# Patient Record
Sex: FEMALE | Race: WHITE | NOT HISPANIC OR LATINO | URBAN - METROPOLITAN AREA
[De-identification: names, ages, dates, MRNs, and addresses within clinical notes are randomized per-mention and may not be internally consistent; named-entity substitution may affect disease eponyms.]

---

## 2023-02-22 LAB — ESTRADIOL (PG/ML) IN SER/PLAS: 79 PG/ML

## 2023-02-26 LAB — ESTRADIOL (PG/ML) IN SER/PLAS: 181 PG/ML

## 2023-02-28 LAB
ESTRADIOL (PG/ML) IN SER/PLAS: 645 PG/ML
PROGESTERONE (NG/ML) IN SER/PLAS: 0.7 NG/ML

## 2023-03-02 LAB — ESTRADIOL (PG/ML) IN SER/PLAS: 1409 PG/ML

## 2023-03-03 LAB — ESTRADIOL (PG/ML) IN SER/PLAS: 2419 PG/ML

## 2023-03-04 ENCOUNTER — APPOINTMENT (OUTPATIENT)
Dept: LAB | Facility: LAB | Age: 33
End: 2023-03-04
Payer: COMMERCIAL

## 2023-03-04 LAB
ESTRADIOL (PG/ML) IN SER/PLAS: 3668 PG/ML
PROGESTERONE (NG/ML) IN SER/PLAS: 1 NG/ML

## 2023-03-05 LAB
ESTRADIOL (PG/ML) IN SER/PLAS: 4015 PG/ML
PROGESTERONE (NG/ML) IN SER/PLAS: 1.3 NG/ML

## 2023-03-20 LAB — CHORIOGONADOTROPIN (MIU/ML) IN SER/PLAS: <3 IU/L

## 2023-03-31 LAB
ESTRADIOL (PG/ML) IN SER/PLAS: 526 PG/ML
PROGESTERONE (NG/ML) IN SER/PLAS: 0.6 NG/ML

## 2023-04-04 LAB
ESTRADIOL (PG/ML) IN SER/PLAS: 1613 PG/ML
PROGESTERONE (NG/ML) IN SER/PLAS: 0.6 NG/ML

## 2023-04-11 LAB — PROGESTERONE (NG/ML) IN SER/PLAS: 30.5 NG/ML

## 2023-04-20 LAB — CHORIOGONADOTROPIN (MIU/ML) IN SER/PLAS: 275 MIU/ML

## 2023-04-25 LAB — CHORIOGONADOTROPIN (MIU/ML) IN SER/PLAS: 1693 MIU/ML

## 2023-05-01 LAB — CHORIOGONADOTROPIN (MIU/ML) IN SER/PLAS: ABNORMAL IU/L

## 2023-05-22 LAB
ABO GROUP (TYPE) IN BLOOD: NORMAL
ALANINE AMINOTRANSFERASE (SGPT) (U/L) IN SER/PLAS: 19 U/L (ref 7–45)
ALBUMIN (G/DL) IN SER/PLAS: 4.1 G/DL (ref 3.4–5)
ALKALINE PHOSPHATASE (U/L) IN SER/PLAS: 44 U/L (ref 33–110)
ANION GAP IN SER/PLAS: 16 MMOL/L (ref 10–20)
ANTIBODY SCREEN: NORMAL
ASPARTATE AMINOTRANSFERASE (SGOT) (U/L) IN SER/PLAS: 25 U/L (ref 9–39)
BILIRUBIN TOTAL (MG/DL) IN SER/PLAS: 0.6 MG/DL (ref 0–1.2)
CALCIUM (MG/DL) IN SER/PLAS: 9.2 MG/DL (ref 8.6–10.3)
CARBON DIOXIDE, TOTAL (MMOL/L) IN SER/PLAS: 21 MMOL/L (ref 21–32)
CHLORIDE (MMOL/L) IN SER/PLAS: 104 MMOL/L (ref 98–107)
CREATININE (MG/DL) IN SER/PLAS: 1 MG/DL (ref 0.5–1.05)
CREATININE (MG/DL) IN URINE: NORMAL
ERYTHROCYTE DISTRIBUTION WIDTH (RATIO) BY AUTOMATED COUNT: 13 % (ref 11.5–14.5)
ERYTHROCYTE MEAN CORPUSCULAR HEMOGLOBIN CONCENTRATION (G/DL) BY AUTOMATED: 35.1 G/DL (ref 32–36)
ERYTHROCYTE MEAN CORPUSCULAR VOLUME (FL) BY AUTOMATED COUNT: 92 FL (ref 80–100)
ERYTHROCYTES (10*6/UL) IN BLOOD BY AUTOMATED COUNT: 4.95 X10E12/L (ref 4–5.2)
ESTIMATED AVERAGE GLUCOSE FOR HBA1C: 85 MG/DL
GFR FEMALE: 77 ML/MIN/1.73M2
GLUCOSE (MG/DL) IN SER/PLAS: 93 MG/DL (ref 74–99)
HEMATOCRIT (%) IN BLOOD BY AUTOMATED COUNT: 45.6 % (ref 36–46)
HEMOGLOBIN (G/DL) IN BLOOD: 16 G/DL (ref 12–16)
HEMOGLOBIN A1C/HEMOGLOBIN TOTAL IN BLOOD: 4.6 %
HEPATITIS B VIRUS SURFACE AG PRESENCE IN SERUM: NONREACTIVE
HEPATITIS C VIRUS AB PRESENCE IN SERUM: NONREACTIVE
HIV 1/ 2 AG/AB SCREEN: NONREACTIVE
LACTATE DEHYDROGENASE (U/L) IN SER/PLAS BY LAC->PYR RXN: 171 U/L (ref 84–246)
LEUKOCYTES (10*3/UL) IN BLOOD BY AUTOMATED COUNT: 10.6 X10E9/L (ref 4.4–11.3)
PLATELETS (10*3/UL) IN BLOOD AUTOMATED COUNT: 196 X10E9/L (ref 150–450)
POTASSIUM (MMOL/L) IN SER/PLAS: 3.7 MMOL/L (ref 3.5–5.3)
PROTEIN (MG/DL) IN URINE: NORMAL
PROTEIN TOTAL: 6.6 G/DL (ref 6.4–8.2)
PROTEIN/CREATININE (MG/MG) IN URINE: NORMAL
REFLEX ADDED, ANEMIA PANEL: NORMAL
RH FACTOR: NORMAL
RUBELLA VIRUS IGG AB: POSITIVE
SODIUM (MMOL/L) IN SER/PLAS: 137 MMOL/L (ref 136–145)
SYPHILIS TOTAL AB: NONREACTIVE
THYROTROPIN (MIU/L) IN SER/PLAS BY DETECTION LIMIT <= 0.05 MIU/L: 0.85 MIU/L (ref 0.44–3.98)
URATE (MG/DL) IN SER/PLAS: 4.8 MG/DL (ref 2.3–6.7)
UREA NITROGEN (MG/DL) IN SER/PLAS: 13 MG/DL (ref 6–23)

## 2023-05-23 LAB
CHLAMYDIA TRACH., AMPLIFIED: NEGATIVE
CREATININE (MG/DL) IN URINE: 110 MG/DL (ref 20–320)
N. GONORRHEA, AMPLIFIED: NEGATIVE
PROTEIN (MG/DL) IN URINE: 12 MG/DL (ref 5–24)
PROTEIN/CREATININE (MG/MG) IN URINE: 0.11 MG/MG CREAT (ref 0–0.17)
URINE CULTURE: NO GROWTH

## 2023-05-24 LAB
HEMOGLOBIN A2: 2.5 %
HEMOGLOBIN A: 97.1 %
HEMOGLOBIN F: 0.4 %
HEMOGLOBIN IDENTIFICATION INTERPRETATION: NORMAL
PATH REVIEW-HGB IDENTIFICATION: NORMAL

## 2023-06-14 LAB
ABO GROUP (TYPE) IN BLOOD: NORMAL
ANTIBODY SCREEN: NORMAL
ERYTHROCYTE DISTRIBUTION WIDTH (RATIO) BY AUTOMATED COUNT: 13.7 % (ref 11.5–14.5)
ERYTHROCYTE MEAN CORPUSCULAR HEMOGLOBIN CONCENTRATION (G/DL) BY AUTOMATED: 33.4 G/DL (ref 32–36)
ERYTHROCYTE MEAN CORPUSCULAR VOLUME (FL) BY AUTOMATED COUNT: 98 FL (ref 80–100)
ERYTHROCYTES (10*6/UL) IN BLOOD BY AUTOMATED COUNT: 4.5 X10E12/L (ref 4–5.2)
HEMATOCRIT (%) IN BLOOD BY AUTOMATED COUNT: 44.3 % (ref 36–46)
HEMOGLOBIN (G/DL) IN BLOOD: 14.8 G/DL (ref 12–16)
LEUKOCYTES (10*3/UL) IN BLOOD BY AUTOMATED COUNT: 6.1 X10E9/L (ref 4.4–11.3)
NRBC (PER 100 WBCS) BY AUTOMATED COUNT: 0 /100 WBC (ref 0–0)
PLATELETS (10*3/UL) IN BLOOD AUTOMATED COUNT: 152 X10E9/L (ref 150–450)
RH FACTOR: NORMAL

## 2023-06-16 ENCOUNTER — HOSPITAL ENCOUNTER (OUTPATIENT)
Dept: DATA CONVERSION | Facility: HOSPITAL | Age: 33
End: 2023-06-16
Attending: OBSTETRICS & GYNECOLOGY | Admitting: OBSTETRICS & GYNECOLOGY
Payer: COMMERCIAL

## 2023-06-16 DIAGNOSIS — O34.31: ICD-10-CM

## 2023-06-16 DIAGNOSIS — F32.A DEPRESSION, UNSPECIFIED: ICD-10-CM

## 2023-06-16 DIAGNOSIS — Z79.82 LONG TERM (CURRENT) USE OF ASPIRIN: ICD-10-CM

## 2023-06-16 DIAGNOSIS — O09.211: ICD-10-CM

## 2023-06-16 DIAGNOSIS — O99.341: ICD-10-CM

## 2023-06-16 DIAGNOSIS — O34.30: ICD-10-CM

## 2023-06-16 DIAGNOSIS — O36.0910: ICD-10-CM

## 2023-06-16 DIAGNOSIS — F41.9 ANXIETY DISORDER, UNSPECIFIED: ICD-10-CM

## 2023-06-16 DIAGNOSIS — O26.21: ICD-10-CM

## 2023-06-16 DIAGNOSIS — Z3A.12 12 WEEKS GESTATION OF PREGNANCY (HHS-HCC): ICD-10-CM

## 2023-06-16 LAB
ABO GROUP (TYPE) IN BLOOD: NORMAL
ANTIBODY SCREEN: NORMAL
ERYTHROCYTE DISTRIBUTION WIDTH (RATIO) BY AUTOMATED COUNT: 13.3 % (ref 11.5–14.5)
ERYTHROCYTE MEAN CORPUSCULAR HEMOGLOBIN CONCENTRATION (G/DL) BY AUTOMATED: 35.3 G/DL (ref 32–36)
ERYTHROCYTE MEAN CORPUSCULAR VOLUME (FL) BY AUTOMATED COUNT: 93 FL (ref 80–100)
ERYTHROCYTES (10*6/UL) IN BLOOD BY AUTOMATED COUNT: 4.58 X10E12/L (ref 4–5.2)
HEMATOCRIT (%) IN BLOOD BY AUTOMATED COUNT: 42.8 % (ref 36–46)
HEMOGLOBIN (G/DL) IN BLOOD: 15.1 G/DL (ref 12–16)
LEUKOCYTES (10*3/UL) IN BLOOD BY AUTOMATED COUNT: 6 X10E9/L (ref 4.4–11.3)
NRBC (PER 100 WBCS) BY AUTOMATED COUNT: 0 /100 WBC (ref 0–0)
PLATELETS (10*3/UL) IN BLOOD AUTOMATED COUNT: 148 X10E9/L (ref 150–450)
RH FACTOR: NORMAL

## 2023-08-25 LAB — THYROTROPIN (MIU/L) IN SER/PLAS BY DETECTION LIMIT <= 0.05 MIU/L: 2 MIU/L (ref 0.44–3.98)

## 2023-09-07 VITALS — HEIGHT: 62 IN | BODY MASS INDEX: 22.48 KG/M2 | WEIGHT: 122.14 LBS

## 2023-09-25 PROBLEM — O26.20 RECURRENT PREGNANCY LOSS, CURRENTLY PREGNANT (HHS-HCC): Status: ACTIVE | Noted: 2023-09-25

## 2023-09-25 PROBLEM — O09.819: Status: ACTIVE | Noted: 2023-09-25

## 2023-09-25 PROBLEM — N97.9 FEMALE INFERTILITY: Status: ACTIVE | Noted: 2023-09-25

## 2023-09-25 PROBLEM — L25.5 DERMATITIS DUE TO PLANTS, INCLUDING POISON IVY, SUMAC, AND OAK: Status: ACTIVE | Noted: 2023-09-25

## 2023-09-25 PROBLEM — E55.9 VITAMIN D DEFICIENCY: Status: ACTIVE | Noted: 2023-09-25

## 2023-09-25 PROBLEM — N92.6 MISSED PERIOD: Status: ACTIVE | Noted: 2023-09-25

## 2023-09-25 PROBLEM — N96 RECURRENT PREGNANCY LOSS WITHOUT CURRENT PREGNANCY: Status: ACTIVE | Noted: 2023-09-25

## 2023-09-25 RX ORDER — ALPRAZOLAM 0.5 MG/1
0.5 TABLET ORAL 3 TIMES DAILY PRN
Status: ON HOLD | COMMUNITY
Start: 2022-10-15 | End: 2023-12-15 | Stop reason: WASHOUT

## 2023-09-25 RX ORDER — PRENATAL SUPPLEMENT WITH DHA 1100; 30; 1.6; 1.8; 15; 2.5; .012; 1; .15; 20; 25; 2; 1000; 200; 20; 29 [IU]/1; MG/1; MG/1; MG/1; MG/1; MG/1; MG/1; MG/1; MG/1; MG/1; MG/1; MG/1; [IU]/1; MG/1; [IU]/1; MG/1
CAPSULE, GELATIN COATED ORAL
COMMUNITY
End: 2024-02-02 | Stop reason: WASHOUT

## 2023-09-25 RX ORDER — PREDNISONE 10 MG/1
TABLET ORAL
COMMUNITY
Start: 2023-05-02 | End: 2023-10-04 | Stop reason: ALTCHOICE

## 2023-09-25 RX ORDER — DOCONEXENT, NIACINAMIDE, .ALPHA.-TOCOPHEROL ACETATE, DL-, CHOLECALCIFEROL, .BETA.-CAROTENE, ASCORBIC ACID, THIAMINE MONONITRATE, RIBOFLAVIN, PYRIDOXINE HYDROCHLORIDE, CYANOCOBALAMIN, IRON, ZINC OXIDE, CUPRIC OXIDE, POTASSIUM IODIDE, MAGNESIUM OXIDE, FOLIC ACID, AND LEVOMEFOLATE CALCIUM 200; 15; 20; 1000; 1100; 30; 1.6; 1.8; 2.5; 12; 29; 25; 2; 150; 20; .4; .6 MG/1; MG/1; [IU]/1; [IU]/1; [IU]/1; MG/1; MG/1; MG/1; MG/1; UG/1; MG/1; MG/1; MG/1; UG/1; MG/1; MG/1; MG/1
1 CAPSULE, LIQUID FILLED ORAL DAILY
Status: ON HOLD | COMMUNITY
Start: 2022-11-15 | End: 2023-12-15 | Stop reason: WASHOUT

## 2023-09-25 RX ORDER — LEVOTHYROXINE SODIUM 25 UG/1
25 TABLET ORAL DAILY
COMMUNITY
End: 2024-01-02 | Stop reason: SDUPTHER

## 2023-09-25 RX ORDER — DOXYCYCLINE HYCLATE 100 MG
100 TABLET ORAL EVERY 12 HOURS
COMMUNITY
Start: 2022-12-21 | End: 2023-10-05 | Stop reason: ALTCHOICE

## 2023-09-25 RX ORDER — PROGESTERONE 50 MG/ML
INJECTION, SOLUTION INTRAMUSCULAR
COMMUNITY
Start: 2023-05-12 | End: 2023-10-04 | Stop reason: ALTCHOICE

## 2023-09-25 RX ORDER — OXYCODONE AND ACETAMINOPHEN 2.5; 325 MG/1; MG/1
TABLET ORAL
COMMUNITY
Start: 2022-10-16 | End: 2023-10-04 | Stop reason: ALTCHOICE

## 2023-09-25 RX ORDER — ESTRADIOL 2 MG/1
6 TABLET ORAL DAILY
COMMUNITY
Start: 2023-06-01 | End: 2023-10-04 | Stop reason: ALTCHOICE

## 2023-09-25 RX ORDER — ASPIRIN 81 MG/1
1 TABLET ORAL DAILY
COMMUNITY
Start: 2022-06-17 | End: 2023-12-19 | Stop reason: HOSPADM

## 2023-09-25 RX ORDER — SODIUM CHLORIDE 9 MG/ML
INJECTION INTRAMUSCULAR; INTRAVENOUS; SUBCUTANEOUS
COMMUNITY
Start: 2023-01-24 | End: 2023-10-06 | Stop reason: ALTCHOICE

## 2023-10-02 NOTE — OP NOTE
Post Operative Note:     PreOp Diagnosis: History of PPROM   Post-Procedure Diagnosis: Same, s/p Delacruz cerclage  placement   Procedure: 1.  Delacruz Cerclage   Surgeon: March   Resident/Fellow/Other Assistant: Carlos/ Perry   Anesthesia: Spinal   I.V. Fluids: 700   Estimated Blood Loss (mL): 10   Blood Replacement: None   Specimen: no   Complications: None, the patient tolerated the procedure  well.   Findings: Normal appearing cervix with small ectropion  near os, internal os closed at the beginning of the procedure.   Patient Returned To/Condition: LDR/ excellent   Urine Output: 100   Drains and/or Catheters: None   Tourniquet Times: NA   Implants: NA   Additional Details: The patient was taken to the  OR where spinal anesthesia was placed & found to be adequate. She was placed in dorsal lithotomy and prepped and draped in usual sterile fashion.  Right angle retractors were placed with excellent visualization of the cervix.  Ethibond suture was used  in typical purse string fashion to place the cerclage.  The knot was tied at 12 o'clock.  No air knot was placed.  Excellent hemostasis at the conclusion of the procedure.     Michelle Gonzalez MD     Attestation:   Note Completion:  Attending Attestation I was present for the entire procedure          Electronic Signatures:  Michelle Gonzalez (Fellow))  (Signed 16-Jun-2023 11:06)   Authored: Post Operative Note  Palak Canales)  (Signed 16-Jun-2023 11:08)   Authored: Note Completion   Co-Signer: Post Operative Note      Last Updated: 16-Jun-2023 11:08 by Palak Canales)

## 2023-10-04 ENCOUNTER — APPOINTMENT (OUTPATIENT)
Dept: INTEGRATIVE MEDICINE | Facility: CLINIC | Age: 33
End: 2023-10-04
Payer: COMMERCIAL

## 2023-10-04 PROBLEM — O26.893 RH NEGATIVE STATE IN ANTEPARTUM PERIOD, THIRD TRIMESTER (HHS-HCC): Status: ACTIVE | Noted: 2023-10-04

## 2023-10-04 PROBLEM — O34.33: Status: ACTIVE | Noted: 2023-10-04

## 2023-10-04 PROBLEM — O99.283 THYROID DISEASE DURING PREGNANCY IN THIRD TRIMESTER (HHS-HCC): Status: ACTIVE | Noted: 2023-10-04

## 2023-10-04 PROBLEM — O34.30: Status: ACTIVE | Noted: 2023-10-04

## 2023-10-04 PROBLEM — E07.9 THYROID DISEASE DURING PREGNANCY IN THIRD TRIMESTER (HHS-HCC): Status: ACTIVE | Noted: 2023-10-04

## 2023-10-04 PROBLEM — Z67.91 RH NEGATIVE STATE IN ANTEPARTUM PERIOD, THIRD TRIMESTER (HHS-HCC): Status: ACTIVE | Noted: 2023-10-04

## 2023-10-04 NOTE — PROGRESS NOTES
Elidia Lindsay is a 32yo  @ 28w1d with a pregnancy complicated by previable PPROM with a loss at 19 weeks in her last pregnancy, RPL and IVF conception. She has a cerclage in situ.     Overall doing well. Feeling fetal movement. Denies VB, LOF, or CTXs.     EPDS 4 today.     O: Visit Vitals  /75   Wt 59.3 kg (130 lb 11.2 oz)   LMP 2023   BMI 24.70 kg/m²   OB Status Pregnant   Smoking Status Never   BSA 1.6 m²     Gen- NAD  Abd- gravid, nontender   Ext- symmetrical, nontender    Growth scan today 1271g c/w 59%ile     A/P: 32yo  @ 28w1d presenting for a return OB visit.   Hx of previable PPROM with D&E at 21w, PPROM likely around 16w  - History consistent with cervical insufficiency, s/p cerclage at 12 weeks this pregnancy  - Weekly  testing at 28 weeks for patient mental health.     2. RPL  - Extensive workup essentially negative.   - APLS labs negative  - Continue baby ASA    3. Subclinical hypothyroidism  - Continue synthroid 12.5 daily.   - Euthyroid on recent labs     4. IVF pregnancy  - Serial growth planned.   - Fetal echo normal.    5. PNC  - PNL reviewed and normal.   - Risk reducing cffDNA, XY  - Rh negative for rhogam today  - 3rd trimester labs today  - RTC weekly for NST, PNV in 2 and 4 weeks, growth in 4 weeks.   - Planning for delivery at 39 weeks unless other indication for earlier delivery.     Marguerite Srinivasan MD  Maternal Fetal Medicine  Director of Fetal Intervention     ------------------  After visit GCT noted to be 199, patient notified this is diagnostic for GDM. Supplies sent. Will get scheduled for bootcamp.

## 2023-10-06 ENCOUNTER — LAB (OUTPATIENT)
Dept: LAB | Facility: LAB | Age: 33
End: 2023-10-06
Payer: COMMERCIAL

## 2023-10-06 ENCOUNTER — ROUTINE PRENATAL (OUTPATIENT)
Dept: MATERNAL FETAL MEDICINE | Facility: CLINIC | Age: 33
End: 2023-10-06
Payer: COMMERCIAL

## 2023-10-06 ENCOUNTER — TELEPHONE (OUTPATIENT)
Dept: OBSTETRICS AND GYNECOLOGY | Facility: HOSPITAL | Age: 33
End: 2023-10-06

## 2023-10-06 ENCOUNTER — APPOINTMENT (OUTPATIENT)
Dept: RADIOLOGY | Facility: CLINIC | Age: 33
End: 2023-10-06
Payer: COMMERCIAL

## 2023-10-06 ENCOUNTER — ANCILLARY PROCEDURE (OUTPATIENT)
Dept: RADIOLOGY | Facility: CLINIC | Age: 33
End: 2023-10-06
Payer: COMMERCIAL

## 2023-10-06 VITALS — WEIGHT: 130.7 LBS | BODY MASS INDEX: 24.7 KG/M2 | DIASTOLIC BLOOD PRESSURE: 75 MMHG | SYSTOLIC BLOOD PRESSURE: 108 MMHG

## 2023-10-06 DIAGNOSIS — Z23 NEED FOR TDAP VACCINATION: ICD-10-CM

## 2023-10-06 DIAGNOSIS — O09.813 PREGNANCY RESULTING FROM IN VITRO FERTILIZATION IN THIRD TRIMESTER (HHS-HCC): ICD-10-CM

## 2023-10-06 DIAGNOSIS — O34.33: ICD-10-CM

## 2023-10-06 DIAGNOSIS — O09.813 PREGNANCY RESULTING FROM IN VITRO FERTILIZATION IN THIRD TRIMESTER (HHS-HCC): Primary | ICD-10-CM

## 2023-10-06 DIAGNOSIS — Z34.90 PREGNANT (HHS-HCC): ICD-10-CM

## 2023-10-06 DIAGNOSIS — Z67.91 RH NEGATIVE STATE IN ANTEPARTUM PERIOD, THIRD TRIMESTER (HHS-HCC): ICD-10-CM

## 2023-10-06 DIAGNOSIS — O24.419 GESTATIONAL DIABETES MELLITUS (GDM) IN THIRD TRIMESTER, GESTATIONAL DIABETES METHOD OF CONTROL UNSPECIFIED (HHS-HCC): ICD-10-CM

## 2023-10-06 DIAGNOSIS — O26.20 RECURRENT PREGNANCY LOSS, CURRENTLY PREGNANT (HHS-HCC): ICD-10-CM

## 2023-10-06 DIAGNOSIS — E07.9 THYROID DISEASE DURING PREGNANCY IN THIRD TRIMESTER (HHS-HCC): ICD-10-CM

## 2023-10-06 DIAGNOSIS — Z23 NEED FOR VACCINATION FOR H FLU TYPE B: ICD-10-CM

## 2023-10-06 DIAGNOSIS — O26.893 RH NEGATIVE STATE IN ANTEPARTUM PERIOD, THIRD TRIMESTER (HHS-HCC): ICD-10-CM

## 2023-10-06 DIAGNOSIS — O34.30: ICD-10-CM

## 2023-10-06 DIAGNOSIS — O99.283 THYROID DISEASE DURING PREGNANCY IN THIRD TRIMESTER (HHS-HCC): ICD-10-CM

## 2023-10-06 DIAGNOSIS — O24.414 GESTATIONAL DIABETES MELLITUS IN PREGNANCY, INSULIN CONTROLLED (HHS-HCC): ICD-10-CM

## 2023-10-06 DIAGNOSIS — O09.523 SUPERVISION OF ELDERLY MULTIGRAVIDA, THIRD TRIMESTER (HHS-HCC): ICD-10-CM

## 2023-10-06 LAB
ERYTHROCYTE [DISTWIDTH] IN BLOOD BY AUTOMATED COUNT: 14.9 % (ref 11.5–14.5)
GLUCOSE 1H P 50 G GLC PO SERPL-MCNC: 199 MG/DL
HCT VFR BLD AUTO: 35.1 % (ref 36–46)
HGB BLD-MCNC: 12.3 G/DL (ref 12–16)
MCH RBC QN AUTO: 34.1 PG (ref 26–34)
MCHC RBC AUTO-ENTMCNC: 35 G/DL (ref 32–36)
MCV RBC AUTO: 97 FL (ref 80–100)
NRBC BLD-RTO: 0 /100 WBCS (ref 0–0)
PLATELET # BLD AUTO: 146 X10*3/UL (ref 150–450)
PMV BLD AUTO: 11 FL (ref 7.5–11.5)
POC APPEARANCE, URINE: CLEAR
POC BILIRUBIN, URINE: NEGATIVE
POC BLOOD, URINE: NEGATIVE
POC COLOR, URINE: YELLOW
POC GLUCOSE, URINE: NEGATIVE MG/DL
POC KETONES, URINE: NEGATIVE MG/DL
POC LEUKOCYTES, URINE: NEGATIVE
POC NITRITE,URINE: NEGATIVE
POC PH, URINE: 7 PH
POC PROTEIN, URINE: NEGATIVE MG/DL
POC SPECIFIC GRAVITY, URINE: 1.02
POC UROBILINOGEN, URINE: 0.2 EU/DL
RBC # BLD AUTO: 3.61 X10*6/UL (ref 4–5.2)
T PALLIDUM AB SER QL: NONREACTIVE
WBC # BLD AUTO: 7.9 X10*3/UL (ref 4.4–11.3)

## 2023-10-06 PROCEDURE — 76819 FETAL BIOPHYS PROFIL W/O NST: CPT

## 2023-10-06 PROCEDURE — 90471 IMMUNIZATION ADMIN: CPT | Performed by: OBSTETRICS & GYNECOLOGY

## 2023-10-06 PROCEDURE — 86780 TREPONEMA PALLIDUM: CPT

## 2023-10-06 PROCEDURE — 90472 IMMUNIZATION ADMIN EACH ADD: CPT | Performed by: OBSTETRICS & GYNECOLOGY

## 2023-10-06 PROCEDURE — 2500000004 HC RX 250 GENERAL PHARMACY W/ HCPCS (ALT 636 FOR OP/ED): Performed by: OBSTETRICS & GYNECOLOGY

## 2023-10-06 PROCEDURE — 76816 OB US FOLLOW-UP PER FETUS: CPT

## 2023-10-06 PROCEDURE — 90715 TDAP VACCINE 7 YRS/> IM: CPT | Performed by: OBSTETRICS & GYNECOLOGY

## 2023-10-06 PROCEDURE — 76816 OB US FOLLOW-UP PER FETUS: CPT | Performed by: OBSTETRICS & GYNECOLOGY

## 2023-10-06 PROCEDURE — 82947 ASSAY GLUCOSE BLOOD QUANT: CPT

## 2023-10-06 PROCEDURE — 36415 COLL VENOUS BLD VENIPUNCTURE: CPT

## 2023-10-06 PROCEDURE — 99214 OFFICE O/P EST MOD 30 MIN: CPT | Performed by: OBSTETRICS & GYNECOLOGY

## 2023-10-06 PROCEDURE — 76819 FETAL BIOPHYS PROFIL W/O NST: CPT | Performed by: OBSTETRICS & GYNECOLOGY

## 2023-10-06 PROCEDURE — 85027 COMPLETE CBC AUTOMATED: CPT

## 2023-10-06 PROCEDURE — 90686 IIV4 VACC NO PRSV 0.5 ML IM: CPT | Performed by: OBSTETRICS & GYNECOLOGY

## 2023-10-06 PROCEDURE — 81003 URINALYSIS AUTO W/O SCOPE: CPT | Mod: QW | Performed by: OBSTETRICS & GYNECOLOGY

## 2023-10-06 RX ORDER — ISOPROPYL ALCOHOL 70 ML/100ML
1 SWAB TOPICAL 4 TIMES DAILY
Qty: 120 EACH | Refills: 3 | Status: SHIPPED | OUTPATIENT
Start: 2023-10-06 | End: 2024-02-02 | Stop reason: WASHOUT

## 2023-10-06 RX ORDER — LANCETS
1 EACH MISCELLANEOUS 4 TIMES DAILY
Qty: 120 EACH | Refills: 3 | Status: SHIPPED | OUTPATIENT
Start: 2023-10-06 | End: 2024-05-06 | Stop reason: ALTCHOICE

## 2023-10-06 RX ORDER — BLOOD-GLUCOSE METER
1 EACH MISCELLANEOUS 4 TIMES DAILY
Qty: 120 STRIP | Refills: 3 | Status: SHIPPED | OUTPATIENT
Start: 2023-10-06 | End: 2023-11-05

## 2023-10-06 RX ORDER — INSULIN PUMP SYRINGE, 3 ML
1 EACH MISCELLANEOUS 4 TIMES DAILY
Qty: 1 EACH | Refills: 0 | Status: SHIPPED | OUTPATIENT
Start: 2023-10-06 | End: 2024-02-02 | Stop reason: WASHOUT

## 2023-10-06 RX ADMIN — INFLUENZA VIRUS VACCINE 0.5 ML: 15; 15; 15; 15 SUSPENSION INTRAMUSCULAR at 09:13

## 2023-10-06 RX ADMIN — TETANUS TOXOID, REDUCED DIPHTHERIA TOXOID AND ACELLULAR PERTUSSIS VACCINE, ADSORBED 0.5 ML: 5; 2.5; 8; 8; 2.5 SUSPENSION INTRAMUSCULAR at 09:10

## 2023-10-06 RX ADMIN — HUMAN RHO(D) IMMUNE GLOBULIN 300 MCG: 300 INJECTION, SOLUTION INTRAMUSCULAR at 09:14

## 2023-10-06 ASSESSMENT — EDINBURGH POSTNATAL DEPRESSION SCALE (EPDS)
THINGS HAVE BEEN GETTING ON TOP OF ME: NO, I HAVE BEEN COPING AS WELL AS EVER
I HAVE FELT SAD OR MISERABLE: NO, NOT AT ALL
I HAVE FELT SCARED OR PANICKY FOR NO GOOD REASON: NO, NOT MUCH
I HAVE BEEN SO UNHAPPY THAT I HAVE HAD DIFFICULTY SLEEPING: NOT AT ALL
TOTAL SCORE: 4
I HAVE BLAMED MYSELF UNNECESSARILY WHEN THINGS WENT WRONG: NOT VERY OFTEN
I HAVE BEEN SO UNHAPPY THAT I HAVE BEEN CRYING: NO, NEVER
THE THOUGHT OF HARMING MYSELF HAS OCCURRED TO ME: NEVER
I HAVE LOOKED FORWARD WITH ENJOYMENT TO THINGS: AS MUCH AS I EVER DID
I HAVE BEEN ANXIOUS OR WORRIED FOR NO GOOD REASON: YES, SOMETIMES
I HAVE BEEN ABLE TO LAUGH AND SEE THE FUNNY SIDE OF THINGS: AS MUCH AS I ALWAYS COULD

## 2023-10-07 NOTE — TELEPHONE ENCOUNTER
Patient notified of GCT results, supplies sent.  tasked to set up DM bootcamp.     Marguerite Srinivasan MD

## 2023-10-11 ENCOUNTER — ALLIED HEALTH (OUTPATIENT)
Dept: INTEGRATIVE MEDICINE | Facility: CLINIC | Age: 33
End: 2023-10-11

## 2023-10-11 DIAGNOSIS — O09.813 PREGNANCY RESULTING FROM IN VITRO FERTILIZATION IN THIRD TRIMESTER (HHS-HCC): Primary | ICD-10-CM

## 2023-10-11 PROCEDURE — 97810 ACUP 1/> WO ESTIM 1ST 15 MIN: CPT | Performed by: ACUPUNCTURIST

## 2023-10-11 PROCEDURE — 97811 ACUP 1/> W/O ESTIM EA ADD 15: CPT | Performed by: ACUPUNCTURIST

## 2023-10-11 NOTE — PROGRESS NOTES
Acupuncture Visit:     Subjective   Patient ID: Elidia Lindsay is a 33 y.o. female who presents for Pregnancy Support    Pregnancy support:  She is 29 weeks pregnant.  She was diagnosed with gestational diabetes last week. No longer having hip pain on the right side.         Session Information  Is this acupuncture treatment being billed to the patient's insurance company: No  Visit Type: Follow-up visit  Medical History Reviewed: I have reviewed pertinent medical history in EHR, and no contraindications are present to provide treatment         Review of Systems  Sleep: not good. She is uncomfortable from her back hurting.  Digestion: doing well.        Provider reviewed plan for the acupuncture session, precautions and contraindications. Patient/guardian/hospital staff has given consent to treat with full understanding of what to expect during the session. Before acupuncture began, provider explained to the patient to communicate at any time if the procedure was causing discomfort past their tolerance level. Patient agreed to advise acupuncturist. The acupuncturist counseled the patient on the risks of acupuncture treatment including pain, infection, bleeding, and no relief of pain. The patient was positioned comfortably. There was no evidence of infection at the site of needle insertions.    Objective   Physical Exam         Treatment Plan  Pattern Differentiation: Liver Qi Stagnation    Acupuncture Treatment  Patient Position: Lateral recumbent on a table  Acupuncture Needling: Yes  Needle Guage: 36 guage /.20/ Blue seirin  Body Points: With retention  Body Points - Bilateral: Du 20, UB 20, UB 23, yaoyan  Body Points - Right: GB 34, ST 36, GB 41, SOBEIDA 3, SJ 5  Auricular Points: No  Other Techniques Utilized: Ear seeds, TDP Lamp  Ear Seeds: Stainless steel (Shenmen)  TDP Lamp Descripton: feet and abdomen  Needle Count In: 12  Needle Count Out: 12  Needle Retention Time (min): 25 minutes  Total Face to Face Time  (min): 25 minutes              Assessment/Plan   Diagnoses and all orders for this visit:  Pregnancy resulting from in vitro fertilization in third trimester

## 2023-10-13 ENCOUNTER — CLINICAL SUPPORT (OUTPATIENT)
Dept: OBSTETRICS AND GYNECOLOGY | Facility: CLINIC | Age: 33
End: 2023-10-13
Payer: COMMERCIAL

## 2023-10-13 VITALS — SYSTOLIC BLOOD PRESSURE: 109 MMHG | DIASTOLIC BLOOD PRESSURE: 70 MMHG

## 2023-10-13 DIAGNOSIS — O09.813 PREGNANCY RESULTING FROM IN VITRO FERTILIZATION IN THIRD TRIMESTER (HHS-HCC): ICD-10-CM

## 2023-10-13 DIAGNOSIS — Z3A.29 29 WEEKS GESTATION OF PREGNANCY (HHS-HCC): Primary | ICD-10-CM

## 2023-10-13 DIAGNOSIS — O24.410 DIET CONTROLLED GESTATIONAL DIABETES MELLITUS (GDM) IN THIRD TRIMESTER (HHS-HCC): ICD-10-CM

## 2023-10-13 PROCEDURE — 59025 FETAL NON-STRESS TEST: CPT | Performed by: OBSTETRICS & GYNECOLOGY

## 2023-10-13 NOTE — PROGRESS NOTES
Elidiabelinda Ungerdenzel, a  at 29w1d with an GEOVANNY of 2023, by Ultrasound, was seen at  [facility] for a nonstress test.    Non-Stress Test   Baseline Fetal Heart Rate for Non-Stress Test: 135 BPM  Variability in Waveform for Non-Stress Test: Moderate  Accelerations in Non-Stress Test: Yes, greater than/equal to 10 bpm, lasting at least 10 seconds  Decelerations in Non-Stress Test: None  Contractions in Non-Stress Test: Not present  Acoustic Stimulator for Non-Stress Test: No  Interpretation of Non-Stress Test   Interpretation of Non-Stress Test: Reactive                                       Jacob Bernal, DARIO  Please let the patient know her x-ray was negative for a Lt. Great toe fracture.        Spoke with pt and informed pt of negative xray results. Pt verbalized understanding.

## 2023-10-18 ENCOUNTER — ALLIED HEALTH (OUTPATIENT)
Dept: INTEGRATIVE MEDICINE | Facility: CLINIC | Age: 33
End: 2023-10-18

## 2023-10-18 DIAGNOSIS — O09.813 PREGNANCY RESULTING FROM IN VITRO FERTILIZATION IN THIRD TRIMESTER (HHS-HCC): ICD-10-CM

## 2023-10-18 DIAGNOSIS — M54.59 OTHER LOW BACK PAIN: Primary | ICD-10-CM

## 2023-10-18 PROCEDURE — 97811 ACUP 1/> W/O ESTIM EA ADD 15: CPT | Performed by: ACUPUNCTURIST

## 2023-10-18 PROCEDURE — 97810 ACUP 1/> WO ESTIM 1ST 15 MIN: CPT | Performed by: ACUPUNCTURIST

## 2023-10-18 NOTE — PROGRESS NOTES
Acupuncture Visit:     Subjective   Patient ID: Elidia Lindsay is a 33 y.o. female who presents for Back Pain and Pregnancy Support    Lower back pain: low back showed improvement after the last acupuncture treatment.  Less pain in the lower back and less pain in the right hip.     Pregnancy support:  She is 30 weeks pregnant.  She is doing well with managing her blood sugar.  She is trying to eat more protein and more regularly. She feels like baby has dropped a little.  Less pressure in the diaphragm area.        Session Information  Is this acupuncture treatment being billed to the patient's insurance company: No  Visit Type: Follow-up visit  Medical History Reviewed: I have reviewed pertinent medical history in EHR, and no contraindications are present to provide treatment         Review of Systems  Sleep: not good due to pregnancy  Digestion: doing well.        Provider reviewed plan for the acupuncture session, precautions and contraindications. Patient/guardian/hospital staff has given consent to treat with full understanding of what to expect during the session. Before acupuncture began, provider explained to the patient to communicate at any time if the procedure was causing discomfort past their tolerance level. Patient agreed to advise acupuncturist. The acupuncturist counseled the patient on the risks of acupuncture treatment including pain, infection, bleeding, and no relief of pain. The patient was positioned comfortably. There was no evidence of infection at the site of needle insertions.    Objective   Physical Exam              Acupuncture Treatment  Patient Position: Lateral recumbent on a table  Acupuncture Needling: Yes  Needle Guage: 36 guage /.20/ Blue seirin  Body Points: With retention  Body Points - Left: KD 3  Body Points - Bilateral: Du 20, UB 18, UB 20, UB 23, yaoyan, SOBEIDA 3  Body Points - Right: GB 34, ST 36, GB 41  Auricular Points: No  Other Techniques Utilized: Ear seeds, TDP Lamp  Ear  Seeds: Stainless steel (shenmen)  TDP Lamp Descripton: lower back and feet  Needle Count In: 15  Needle Count Out: 15  Needle Retention Time (min): 25 minutes  Total Face to Face Time (min): 25 minutes              Assessment/Plan   Diagnoses and all orders for this visit:  Other low back pain  Pregnancy resulting from in vitro fertilization in third trimester

## 2023-10-19 ENCOUNTER — EDUCATION (OUTPATIENT)
Dept: MATERNAL FETAL MEDICINE | Facility: HOSPITAL | Age: 33
End: 2023-10-19
Payer: COMMERCIAL

## 2023-10-19 NOTE — PROGRESS NOTES
Gestational Diabetes Self-Management VIRTUAL Group Education provided today,     Overview of Diabetes    Type 1    Type 2    Gestational       -Risks to baby and Mom  Self-monitoring     Tips for Testing/troubleshooting glucometers    Goal range for blood sugars (<95 fasting, <140 1 hour postprandial for all meals)    Record Keeping    Blood Sugar Line    Blood Sugar Log Sheets - provided  Managing Diabetes     Physical Activity - recommendation is about 150 minutes per week    Medication        Oral/insulin overview  Additional  testing     NST/BPP/Growth ultrasound - general overview  Postpartum     Expectations in the hospital    Follow up - recommend fasting, 75g OGGT, 6-8 weeks after delivery     50% change of developing GDM in another pregnancy    50% chance of developing T2DM within next 10 years    Up to 30% of patients will have pre-diabetes or T2DM following delivery       Breastfeeding is strongly encouraged   Special considerations, self-management    Hypoglycemia    Hyperglycemia    Sick Day Rules  Resilience training/stress management    Engage your senses    Gratitude practice  Emotional support     “Baby Blues”    Postpartum Depression       When to call for help  Nutrition planning     Identify carbohydrates, serving size, carbohydrate counting    “Free Foods” - very low carbohydrate options    Label Reading    Personalized Meal Plan     3 meals + 3 snacks = approximately 175g carbohydrates/day    Follow up for 1:1 Registered Dietician consult       [phone] to schedule appointment    Individual consult with Maternal Fetal Medicine provider is scheduled.

## 2023-10-20 ENCOUNTER — ROUTINE PRENATAL (OUTPATIENT)
Dept: MATERNAL FETAL MEDICINE | Facility: CLINIC | Age: 33
End: 2023-10-20
Payer: COMMERCIAL

## 2023-10-20 ENCOUNTER — APPOINTMENT (OUTPATIENT)
Dept: OBSTETRICS AND GYNECOLOGY | Facility: CLINIC | Age: 33
End: 2023-10-20
Payer: COMMERCIAL

## 2023-10-20 VITALS — WEIGHT: 134 LBS | BODY MASS INDEX: 25.32 KG/M2 | SYSTOLIC BLOOD PRESSURE: 107 MMHG | DIASTOLIC BLOOD PRESSURE: 76 MMHG

## 2023-10-20 DIAGNOSIS — Z3A.30 30 WEEKS GESTATION OF PREGNANCY (HHS-HCC): ICD-10-CM

## 2023-10-20 LAB
POC APPEARANCE, URINE: CLEAR
POC BILIRUBIN, URINE: NEGATIVE
POC BLOOD, URINE: NEGATIVE
POC COLOR, URINE: YELLOW
POC GLUCOSE, URINE: NEGATIVE MG/DL
POC KETONES, URINE: NEGATIVE MG/DL
POC LEUKOCYTES, URINE: ABNORMAL
POC NITRITE,URINE: NEGATIVE
POC PH, URINE: 7 PH
POC PROTEIN, URINE: NEGATIVE MG/DL
POC SPECIFIC GRAVITY, URINE: 1.01
POC UROBILINOGEN, URINE: 0.2 EU/DL

## 2023-10-20 PROCEDURE — 99214 OFFICE O/P EST MOD 30 MIN: CPT | Performed by: STUDENT IN AN ORGANIZED HEALTH CARE EDUCATION/TRAINING PROGRAM

## 2023-10-20 PROCEDURE — 81003 URINALYSIS AUTO W/O SCOPE: CPT | Mod: QW | Performed by: STUDENT IN AN ORGANIZED HEALTH CARE EDUCATION/TRAINING PROGRAM

## 2023-10-20 NOTE — PROGRESS NOTES
Elidia Lindsay is a 34yo  @ 30w1d with a pregnancy complicated by previable PPROM with a loss at 19 weeks in her last pregnancy, RPL and IVF conception. She has a cerclage in situ.      Overall doing well. +FM. Has been checking her BG values 4x daily. No concerns.     /76   Wt 60.8 kg (134 lb)   LMP 2023   BMI 25.32 kg/m²   Gen: NAD  Resp: nonlabored breathing  Cardiac: good peripheral perfusion  Abd: gravid  Psych: appropriate mood and affect  FHTs: 140s, mod variability, +accels, no decels  [place]: quiet    A/P:     A/P: 34yo  @ 28w1d presenting for a return OB visit.   1. Hx of previable PPROM with D&E at 21w, PPROM likely around 16w  - History consistent with cervical insufficiency, s/p cerclage at 12 weeks this pregnancy  - Weekly  testing at 28 weeks for patient mental health     2. RPL  - Extensive workup essentially negative.   - APLS labs negative  - Continue baby ASA     3. Subclinical hypothyroidism  - Continue synthroid 12.5 daily.   - Euthyroid on labs . Plan for a repeat TSH at next visit     4. IVF pregnancy  - Serial growth planned  - Last growth at 28w1d showed an EFW of 1271 g (59%)  - Fetal echo normal     5. GDMA1  - Discussed risk of fetal hyperbilirubinemia, hyoglycemia, LGA growth pattern and stillbirth  - Pt s/p bootcamp. Checking 4x daily  - All values wnl. Insulin currently not indicated  - Continue growth ultrasounds and  testing as above    6. PNC  - PNL reviewed and normal.   - Risk reducing cffDNA, XY  - Rh negative s/p Rhogam  - 3rd trimester labs today  - RTC weekly for NST, PNV and growth in 2 weeks  - Planning for delivery at 39 weeks unless other indication for earlier delivery.     Darin Rojas MD  Maternal-Fetal Medicine

## 2023-10-25 ENCOUNTER — ALLIED HEALTH (OUTPATIENT)
Dept: INTEGRATIVE MEDICINE | Facility: CLINIC | Age: 33
End: 2023-10-25

## 2023-10-25 DIAGNOSIS — M54.6 RIGHT-SIDED THORACIC BACK PAIN, UNSPECIFIED CHRONICITY: Primary | ICD-10-CM

## 2023-10-25 DIAGNOSIS — O09.813 PREGNANCY RESULTING FROM IN VITRO FERTILIZATION IN THIRD TRIMESTER (HHS-HCC): ICD-10-CM

## 2023-10-25 PROCEDURE — 97811 ACUP 1/> W/O ESTIM EA ADD 15: CPT | Performed by: ACUPUNCTURIST

## 2023-10-25 PROCEDURE — 97810 ACUP 1/> WO ESTIM 1ST 15 MIN: CPT | Performed by: ACUPUNCTURIST

## 2023-10-25 NOTE — PROGRESS NOTES
Acupuncture Visit:     Subjective   Patient ID: Elidia Lindsay is a 33 y.o. female who presents for Back Pain and Pregnancy Support    Upper back pain: she thinks that she had a rib pop out in the middle back.  Chiropractor worked on it this week and it is starting to improve but tender and swollen.  Pain at the right midback around T 7/8    Pregnancy support:  She is 31 weeks pregnant.  Blood sugar has been well managed.  She is doing well overall.        Session Information  Is this acupuncture treatment being billed to the patient's insurance company: No  Visit Type: Follow-up visit  Medical History Reviewed: I have reviewed pertinent medical history in EHR, and no contraindications are present to provide treatment         Review of Systems  Sleep: really bad due to back pain.  Digestion: doing well.        Provider reviewed plan for the acupuncture session, precautions and contraindications. Patient/guardian/hospital staff has given consent to treat with full understanding of what to expect during the session. Before acupuncture began, provider explained to the patient to communicate at any time if the procedure was causing discomfort past their tolerance level. Patient agreed to advise acupuncturist. The acupuncturist counseled the patient on the risks of acupuncture treatment including pain, infection, bleeding, and no relief of pain. The patient was positioned comfortably. There was no evidence of infection at the site of needle insertions.    Objective   Physical Exam              Acupuncture Treatment  Patient Position: Lateral recumbent on a table (Left side)  Needle Guage: 36 guage /.20/ Blue seirin  Body Points: With retention  Body Points - Left: KD 3  Body Points - Bilateral: Du 20, UB 23, UB 20, UB 18, HTJJ T 7/8/9/10  Body Points - Right: GB 34, ST 36, LI 11, SJ 5  Auricular Points: No  Other Techniques Utilized: Ear seeds, TDP Lamp  Ear Seeds: Stainless steel (shenmen)  TDP Lamp Descripton: upper  back  Needle Count In: 20  Needle Count Out: 20  Needle Retention Time (min): 25 minutes  Total Face to Face Time (min): 25 minutes              Assessment/Plan   Diagnoses and all orders for this visit:  Right-sided thoracic back pain, unspecified chronicity  Pregnancy resulting from in vitro fertilization in third trimester

## 2023-10-27 ENCOUNTER — CLINICAL SUPPORT (OUTPATIENT)
Dept: OBSTETRICS AND GYNECOLOGY | Facility: CLINIC | Age: 33
End: 2023-10-27
Payer: COMMERCIAL

## 2023-10-27 ENCOUNTER — DOCUMENTATION (OUTPATIENT)
Dept: MATERNAL FETAL MEDICINE | Facility: CLINIC | Age: 33
End: 2023-10-27

## 2023-10-27 VITALS — SYSTOLIC BLOOD PRESSURE: 104 MMHG | DIASTOLIC BLOOD PRESSURE: 70 MMHG

## 2023-10-27 DIAGNOSIS — O09.299 PREGNANCY WITH POOR OBSTETRIC HISTORY (HHS-HCC): Primary | ICD-10-CM

## 2023-10-27 PROCEDURE — 59025 FETAL NON-STRESS TEST: CPT | Performed by: NURSE PRACTITIONER

## 2023-10-27 NOTE — PROGRESS NOTES
Communicated with the patient on 10/27/2023  She has Gestational Diabetes @ 31w1d     The patient checks her sugars fasting and 1 hour after meals. Her current regimen is as follows:  nutrition plan      The patient's reported blood sugars appear well controlled, with 80% within the goal range. No changes to her current treatment plan are indicated at this time.    Patient understands to submit sugar log for review weekly through the Blood Sugar Line @ [phone] or via email to [email or web address] to help optimize glucose control.     Met with patient during NST today.

## 2023-10-27 NOTE — PROCEDURES
Elidia Lindsay, a  at 31w1d with an GEOVANNY of 2023, by Ultrasound, [facility]    Non-Stress Test   Baseline Fetal Heart Rate for Non-Stress Test: 130 BPM  Variability in Waveform for Non-Stress Test: Moderate  Accelerations in Non-Stress Test: Yes  Decelerations in Non-Stress Test: None  Contractions in Non-Stress Test: Not present  Acoustic Stimulator for Non-Stress Test: No  Interpretation of Non-Stress Test   Interpretation of Non-Stress Test: Reactive  NST for Multiple Fetuses: No      Ana Guevara, APRN-CNP

## 2023-11-03 ENCOUNTER — ROUTINE PRENATAL (OUTPATIENT)
Dept: MATERNAL FETAL MEDICINE | Facility: CLINIC | Age: 33
End: 2023-11-03
Payer: COMMERCIAL

## 2023-11-03 ENCOUNTER — CLINICAL SUPPORT (OUTPATIENT)
Dept: OBSTETRICS AND GYNECOLOGY | Facility: CLINIC | Age: 33
End: 2023-11-03
Payer: COMMERCIAL

## 2023-11-03 VITALS — BODY MASS INDEX: 25.66 KG/M2 | DIASTOLIC BLOOD PRESSURE: 76 MMHG | WEIGHT: 135.8 LBS | SYSTOLIC BLOOD PRESSURE: 116 MMHG

## 2023-11-03 DIAGNOSIS — E07.9 THYROID DISEASE DURING PREGNANCY IN THIRD TRIMESTER (HHS-HCC): ICD-10-CM

## 2023-11-03 DIAGNOSIS — O99.283 THYROID DISEASE DURING PREGNANCY IN THIRD TRIMESTER (HHS-HCC): ICD-10-CM

## 2023-11-03 DIAGNOSIS — O09.813 PREGNANCY RESULTING FROM IN VITRO FERTILIZATION IN THIRD TRIMESTER (HHS-HCC): Primary | ICD-10-CM

## 2023-11-03 DIAGNOSIS — O34.33: ICD-10-CM

## 2023-11-03 DIAGNOSIS — O34.30: ICD-10-CM

## 2023-11-03 DIAGNOSIS — O24.410 DIET CONTROLLED GESTATIONAL DIABETES MELLITUS (GDM) IN THIRD TRIMESTER (HHS-HCC): ICD-10-CM

## 2023-11-03 DIAGNOSIS — O26.20 RECURRENT PREGNANCY LOSS, CURRENTLY PREGNANT (HHS-HCC): ICD-10-CM

## 2023-11-03 LAB
POC APPEARANCE, URINE: CLEAR
POC BILIRUBIN, URINE: NEGATIVE
POC BLOOD, URINE: NEGATIVE
POC COLOR, URINE: YELLOW
POC GLUCOSE, URINE: NEGATIVE MG/DL
POC KETONES, URINE: NEGATIVE MG/DL
POC LEUKOCYTES, URINE: ABNORMAL
POC NITRITE,URINE: NEGATIVE
POC PH, URINE: 7 PH
POC PROTEIN, URINE: NEGATIVE MG/DL
POC SPECIFIC GRAVITY, URINE: 1.02
POC UROBILINOGEN, URINE: 0.2 EU/DL

## 2023-11-03 PROCEDURE — 59025 FETAL NON-STRESS TEST: CPT | Performed by: OBSTETRICS & GYNECOLOGY

## 2023-11-03 PROCEDURE — 81003 URINALYSIS AUTO W/O SCOPE: CPT | Performed by: OBSTETRICS & GYNECOLOGY

## 2023-11-03 PROCEDURE — 99214 OFFICE O/P EST MOD 30 MIN: CPT | Performed by: OBSTETRICS & GYNECOLOGY

## 2023-11-03 NOTE — PROGRESS NOTES
Elidia Lindsay is a 34yo  @ 32w1d with a pregnancy complicated by previable PPROM with a loss at 19 weeks in her last pregnancy, RPL and IVF conception. She has a cerclage in situ.      Overall doing well. +FM. Has been checking her BG values 4x daily. No concerns.     O: Visit Vitals  /76   Wt 61.6 kg (135 lb 12.8 oz)   LMP 2023   BMI 25.66 kg/m²   OB Status Pregnant   Smoking Status Never   BSA 1.63 m²      Gen- NAD  Abd- gravid, nontender  Ext- symmetrical, nontender    NST Impression: Reactive  FHTs: 140 baseline, moderate variability, + accels, no decels  [place]: quiet.      A/P: 34yo  @ 32w1d presenting for a return OB visit.   1. Hx of previable PPROM with D&E at 21w, PPROM likely around 16w  - History consistent with cervical insufficiency, s/p cerclage at 12 weeks this pregnancy  - Weekly  testing at 28 weeks for patient mental health     2. RPL  - Extensive workup essentially negative.   - APLS labs negative  - Continue baby ASA     3. Subclinical hypothyroidism  - Continue synthroid 12.5 daily.   - Euthyroid on labs . Repeat TSH today.      4. IVF pregnancy  - Serial growth planned  - Last growth at 28w1d showed an EFW of 1271 g (59%ile), repeat next week  - Fetal echo normal     5. GDMA1  - Discussed risk of fetal hyperbilirubinemia, hyoglycemia, LGA growth pattern and stillbirth  - Pt s/p bootcamp. Checking 4x daily  - All values wnl. Insulin currently not indicated  - Continue growth ultrasounds and  testing as above     6. PNC  - PNL reviewed and normal.   - Risk reducing cffDNA, XY  - Rh negative s/p Rhogam  - s/p Tdap, flu, COVID, plans RSV   - RTC weekly for NST and PNV, growth next week   - Planning for delivery at 39 weeks unless other indication for earlier delivery.     Marguerite Srinivasan MD  Maternal Fetal Medicine  Director of Fetal Intervention

## 2023-11-06 ENCOUNTER — ANCILLARY PROCEDURE (OUTPATIENT)
Dept: RADIOLOGY | Facility: CLINIC | Age: 33
End: 2023-11-06
Payer: COMMERCIAL

## 2023-11-06 DIAGNOSIS — Z32.01 PREGNANCY TEST POSITIVE (HHS-HCC): ICD-10-CM

## 2023-11-06 PROCEDURE — 76819 FETAL BIOPHYS PROFIL W/O NST: CPT | Performed by: OBSTETRICS & GYNECOLOGY

## 2023-11-06 PROCEDURE — 76816 OB US FOLLOW-UP PER FETUS: CPT

## 2023-11-06 PROCEDURE — 76816 OB US FOLLOW-UP PER FETUS: CPT | Performed by: OBSTETRICS & GYNECOLOGY

## 2023-11-06 PROCEDURE — 76819 FETAL BIOPHYS PROFIL W/O NST: CPT

## 2023-11-08 ENCOUNTER — ALLIED HEALTH (OUTPATIENT)
Dept: INTEGRATIVE MEDICINE | Facility: CLINIC | Age: 33
End: 2023-11-08

## 2023-11-08 DIAGNOSIS — O09.813 PREGNANCY RESULTING FROM IN VITRO FERTILIZATION IN THIRD TRIMESTER (HHS-HCC): Primary | ICD-10-CM

## 2023-11-08 PROCEDURE — 97810 ACUP 1/> WO ESTIM 1ST 15 MIN: CPT | Performed by: ACUPUNCTURIST

## 2023-11-08 PROCEDURE — 97811 ACUP 1/> W/O ESTIM EA ADD 15: CPT | Performed by: ACUPUNCTURIST

## 2023-11-08 NOTE — PROGRESS NOTES
Acupuncture Visit:     Subjective   Patient ID: Elidia Lindsay is a 33 y.o. female who presents for Pregnancy Support    Upper back pain: significantly better.  No longer having pain.    Pregnancy support:  She is 32 weeks pregnant.  Pregnancy is progressing well.  No blood sugar issues. Cerclage is being removed at 36 weeks. Having some acid reflux and constipation.  A lot of difficulty with sleeping.  Feeling tense overall.         Session Information  Is this acupuncture treatment being billed to the patient's insurance company: No  Visit Type: Follow-up visit  Medical History Reviewed: I have reviewed pertinent medical history in EHR, and no contraindications are present to provide treatment         Review of Systems       Provider reviewed plan for the acupuncture session, precautions and contraindications. Patient/guardian/hospital staff has given consent to treat with full understanding of what to expect during the session. Before acupuncture began, provider explained to the patient to communicate at any time if the procedure was causing discomfort past their tolerance level. Patient agreed to advise acupuncturist. The acupuncturist counseled the patient on the risks of acupuncture treatment including pain, infection, bleeding, and no relief of pain. The patient was positioned comfortably. There was no evidence of infection at the site of needle insertions.    Objective   Physical Exam              Acupuncture Treatment  Patient Position: Lateral recumbent on a table (Left side)  Needle Guage: 36 guage /.20/ Blue seirin  Body Points: With retention  Body Points - Bilateral: Du 20, UB 18, UB 20, UB 23  Body Points - Right: GB 34, ST 36, SOBEIDA 3, GB 41, SJ 5, LI 11  Auricular Points: No  Electroacupuncture Used: No  Other Techniques Utilized: Ear seeds, TDP Lamp  Ear Seeds: Stainless steel (shenmen)  TDP Lamp Descripton: feet  Needle Count In: 13  Needle Count Out: 13  Needle Retention Time (min): 25  minutes  Total Face to Face Time (min): 25 minutes              Assessment/Plan   Diagnoses and all orders for this visit:  Pregnancy resulting from in vitro fertilization in third trimester

## 2023-11-10 ENCOUNTER — DOCUMENTATION (OUTPATIENT)
Dept: MATERNAL FETAL MEDICINE | Facility: CLINIC | Age: 33
End: 2023-11-10

## 2023-11-10 ENCOUNTER — CLINICAL SUPPORT (OUTPATIENT)
Dept: OBSTETRICS AND GYNECOLOGY | Facility: CLINIC | Age: 33
End: 2023-11-10
Payer: COMMERCIAL

## 2023-11-10 VITALS — SYSTOLIC BLOOD PRESSURE: 121 MMHG | DIASTOLIC BLOOD PRESSURE: 78 MMHG

## 2023-11-10 DIAGNOSIS — O09.299 PREGNANCY WITH POOR OBSTETRIC HISTORY (HHS-HCC): Primary | ICD-10-CM

## 2023-11-10 DIAGNOSIS — O24.410 DIET CONTROLLED GESTATIONAL DIABETES MELLITUS (GDM) IN THIRD TRIMESTER (HHS-HCC): ICD-10-CM

## 2023-11-10 PROCEDURE — 59025 FETAL NON-STRESS TEST: CPT | Performed by: OBSTETRICS & GYNECOLOGY

## 2023-11-10 NOTE — PROGRESS NOTES
Communicated with the patient on 11/10/2023 during NST  She has Gestational Diabetes @ 33w1d     The patient checks her sugars fasting and 1 hour after meals. Her current regimen is as follows:  nutrition plan      The patient's reported blood sugars appear well controlled, all levels within goal range. Testing every other day. No changes to her current treatment plan are indicated at this time.    Patient understands to submit sugar log for review weekly through the Blood Sugar Line @ [phone] or via email to [email or web address] to help optimize glucose control.

## 2023-11-10 NOTE — PROCEDURES
Elidia Lindsay, a  at 33w1d with an GEOVANNY of 2023, by Ultrasound, [facility].    Indidation: poor obstetric history.     Non-Stress Test   Baseline Fetal Heart Rate for Non-Stress Test: 140 BPM  Variability in Waveform for Non-Stress Test: Moderate  Accelerations in Non-Stress Test: Yes  Decelerations in Non-Stress Test: None  Contractions in Non-Stress Test: Not present  Acoustic Stimulator for Non-Stress Test: Yes  Interpretation of Non-Stress Test   Interpretation of Non-Stress Test: Reactive         Marguerite Srinivasan MD  Maternal Fetal Medicine  Director of Fetal Intervention

## 2023-11-17 ENCOUNTER — LAB (OUTPATIENT)
Dept: LAB | Facility: LAB | Age: 33
End: 2023-11-17
Payer: COMMERCIAL

## 2023-11-17 ENCOUNTER — ROUTINE PRENATAL (OUTPATIENT)
Dept: MATERNAL FETAL MEDICINE | Facility: CLINIC | Age: 33
End: 2023-11-17
Payer: COMMERCIAL

## 2023-11-17 ENCOUNTER — CLINICAL SUPPORT (OUTPATIENT)
Dept: OBSTETRICS AND GYNECOLOGY | Facility: CLINIC | Age: 33
End: 2023-11-17
Payer: COMMERCIAL

## 2023-11-17 VITALS — SYSTOLIC BLOOD PRESSURE: 118 MMHG | WEIGHT: 135.5 LBS | DIASTOLIC BLOOD PRESSURE: 83 MMHG | BODY MASS INDEX: 25.6 KG/M2

## 2023-11-17 DIAGNOSIS — O09.813 PREGNANCY RESULTING FROM IN VITRO FERTILIZATION IN THIRD TRIMESTER (HHS-HCC): ICD-10-CM

## 2023-11-17 DIAGNOSIS — O26.893 RH NEGATIVE STATE IN ANTEPARTUM PERIOD, THIRD TRIMESTER (HHS-HCC): ICD-10-CM

## 2023-11-17 DIAGNOSIS — O26.20 RECURRENT PREGNANCY LOSS, CURRENTLY PREGNANT (HHS-HCC): ICD-10-CM

## 2023-11-17 DIAGNOSIS — O34.30: ICD-10-CM

## 2023-11-17 DIAGNOSIS — O99.283 THYROID DISEASE DURING PREGNANCY IN THIRD TRIMESTER (HHS-HCC): ICD-10-CM

## 2023-11-17 DIAGNOSIS — N96 RECURRENT PREGNANCY LOSS WITHOUT CURRENT PREGNANCY: ICD-10-CM

## 2023-11-17 DIAGNOSIS — Z67.91 RH NEGATIVE STATE IN ANTEPARTUM PERIOD, THIRD TRIMESTER (HHS-HCC): ICD-10-CM

## 2023-11-17 DIAGNOSIS — E07.9 THYROID DISEASE DURING PREGNANCY IN THIRD TRIMESTER (HHS-HCC): ICD-10-CM

## 2023-11-17 DIAGNOSIS — O34.33: ICD-10-CM

## 2023-11-17 LAB — TSH SERPL-ACNC: 2.07 MIU/L (ref 0.44–3.98)

## 2023-11-17 PROCEDURE — 59025 FETAL NON-STRESS TEST: CPT | Performed by: STUDENT IN AN ORGANIZED HEALTH CARE EDUCATION/TRAINING PROGRAM

## 2023-11-17 PROCEDURE — 99214 OFFICE O/P EST MOD 30 MIN: CPT | Performed by: STUDENT IN AN ORGANIZED HEALTH CARE EDUCATION/TRAINING PROGRAM

## 2023-11-17 PROCEDURE — 84443 ASSAY THYROID STIM HORMONE: CPT

## 2023-11-17 PROCEDURE — 99214 OFFICE O/P EST MOD 30 MIN: CPT | Mod: 25 | Performed by: STUDENT IN AN ORGANIZED HEALTH CARE EDUCATION/TRAINING PROGRAM

## 2023-11-17 PROCEDURE — 36415 COLL VENOUS BLD VENIPUNCTURE: CPT

## 2023-11-17 NOTE — PROGRESS NOTES
Elidia Lindsay is a 34 yo  @ 34w1d with a pregnancy complicated by previable PPROM with a loss at 19 weeks in her last pregnancy, RPL and IVF conception. She has a cerclage in situ.      Overall doing well. +FM. Has been checking her BG values 4x every other day. No concerns.     O: /83   Wt 61.5 kg (135 lb 8 oz)   LMP 2023   BMI 25.60 kg/m²   Gen: NAD  Resp: nonlabored breathing  Cardiac: good peripheral perfusion  Abd: gravid  Psych: appropriate mood and affect    FHTs: 130s, mod variability, +accels, no decels  [place]: Irregular    Reactive NST.     A/P: 34yo  @ 34w1d presenting for a return OB visit.     1. Hx of previable PPROM with D&E at 21w, PPROM likely around 16w  - History consistent with cervical insufficiency, s/p cerclage at 12 weeks this pregnancy  - Weekly  testing at 28 weeks for patient mental health     2. RPL  - Extensive workup essentially negative.   - APLS labs negative  - Continue baby ASA     3. Subclinical hypothyroidism  - Continue synthroid 12.5 daily.   - Euthyroid on labs . Repeat TSH ordered     4. IVF pregnancy  - Serial growth planned  - Last growth at 32w4d showed an EFW of 1999 g (39%) and mild polyhydramnios (JEFF 24.4 cm). Next in two weeks  - Fetal echo normal     5. GDMA1  - Previously discussed risk of fetal hyperbilirubinemia, hyoglycemia, LGA growth pattern and stillbirth  - Pt s/p bootcamp. Checking 4x daily  - All values wnl. Insulin currently not indicated  - Continue growth ultrasounds and  testing as above     6. PNC  - PNL reviewed and normal.   - Risk reducing cffDNA, XY  - Rh negative s/p Rhogam  - s/p Tdap, flu, COVID, plans RSV   - RTC weekly for NST and PNV, growth in two weeks  - Planning for delivery at 39 weeks unless other indication for earlier delivery.     Darin Rojas MD  Maternal-Fetal Medicine

## 2023-11-17 NOTE — PROCEDURES
Elidia Lindsay, a  at 34w1d with an GEOVANNY of 2023, by Ultrasound, was seen at  [facility] for a nonstress test.    Non-Stress Test   Baseline Fetal Heart Rate for Non-Stress Test: 130 BPM  Variability in Waveform for Non-Stress Test: Moderate  Accelerations in Non-Stress Test: Yes  Decelerations in Non-Stress Test: None  Contractions in Non-Stress Test: Irregular  Acoustic Stimulator for Non-Stress Test: No  Interpretation of Non-Stress Test   Interpretation of Non-Stress Test: Reactive     Darin Rojas MD  Maternal-Fetal Medicine

## 2023-11-24 ENCOUNTER — ROUTINE PRENATAL (OUTPATIENT)
Dept: MATERNAL FETAL MEDICINE | Facility: CLINIC | Age: 33
End: 2023-11-24
Payer: COMMERCIAL

## 2023-11-24 ENCOUNTER — CLINICAL SUPPORT (OUTPATIENT)
Dept: OBSTETRICS AND GYNECOLOGY | Facility: CLINIC | Age: 33
End: 2023-11-24
Payer: COMMERCIAL

## 2023-11-24 VITALS — DIASTOLIC BLOOD PRESSURE: 80 MMHG | SYSTOLIC BLOOD PRESSURE: 124 MMHG

## 2023-11-24 DIAGNOSIS — Z3A.35 35 WEEKS GESTATION OF PREGNANCY (HHS-HCC): Primary | ICD-10-CM

## 2023-11-24 DIAGNOSIS — O24.410 DIET CONTROLLED GESTATIONAL DIABETES MELLITUS (GDM) IN THIRD TRIMESTER (HHS-HCC): ICD-10-CM

## 2023-11-24 DIAGNOSIS — O09.813 PREGNANCY RESULTING FROM IN VITRO FERTILIZATION IN THIRD TRIMESTER (HHS-HCC): ICD-10-CM

## 2023-11-24 DIAGNOSIS — O34.30: ICD-10-CM

## 2023-11-24 PROCEDURE — 59025 FETAL NON-STRESS TEST: CPT | Performed by: OBSTETRICS & GYNECOLOGY

## 2023-11-24 PROCEDURE — 99214 OFFICE O/P EST MOD 30 MIN: CPT | Performed by: OBSTETRICS & GYNECOLOGY

## 2023-11-24 NOTE — PROGRESS NOTES
Elidia Lindsay is a 32 yo  @ 35w1d with a pregnancy complicated by previable PPROM with a loss at 19 weeks in her last pregnancy, RPL and IVF conception. She has a cerclage in situ.      Overall doing well. +FM. Only rare contractions, no LOF.  Feeling lots of pressure from cerclage, feels a sharp tugging that is constant and positional in nature.    Has been checking her BG values 4x, though missed a few days this week.  All within goal.     O: /80   LMP 2023   Gen: NAD  Resp: nonlabored breathing  Cardiac: good peripheral perfusion  Abd: gravid  Psych: appropriate mood and affect  SSE: NEFG, normal vaginal mucosa.  Cerclage removed without difficulty.  Cervix /-3 on exam.    Reactive NST.     A/P: 32yo  @ 35w1d  presenting for a return OB visit.     1. Hx of previable PPROM with D&E at 21w, PPROM likely around 16w  - History consistent with cervical insufficiency, s/p cerclage at 12 weeks this pregnancy  - Weekly  testing at 28 weeks for patient mental health  - Reviewed implication of taking out cerclage at this time.  Discussed that likely received majority of benefit but earlier removal may increase risk of delivery in late  period though not unreasonable based on reported discomfort.  Following discussion made joint decision to remove today.     2. RPL  - Extensive workup essentially negative.   - APLS labs negative  - Continue baby ASA     3. Subclinical hypothyroidism  - Continue synthroid 12.5 daily.   - Euthyroid on labs . Repeat TSH ordered     4. IVF pregnancy  - Serial growth planned  - Last growth at 32w4d showed an EFW of 1999 g (39%) and mild polyhydramnios (JEFF 24.4 cm). Next in two weeks  - Fetal echo normal     5. GDMA1  - Previously discussed risk of fetal hyperbilirubinemia, hyoglycemia, LGA growth pattern and stillbirth  - Pt s/p bootcamp. Checking 4x daily  - All values wnl. Insulin currently not indicated  - Continue growth ultrasounds and   testing as above     6. PNC  - PNL reviewed and normal.   - Risk reducing cffDNA, XY  - Rh negative s/p Rhogam  - s/p Tdap, flu, COVID, plans RSV   - RTC weekly for NST and PNV, growth in two weeks  - Planning for delivery at 39 weeks unless other indication for earlier delivery.     Brian Acuña MD  Maternal Fetal Medicine

## 2023-11-27 ENCOUNTER — ROUTINE PRENATAL (OUTPATIENT)
Dept: MATERNAL FETAL MEDICINE | Facility: CLINIC | Age: 33
End: 2023-11-27
Payer: COMMERCIAL

## 2023-11-27 ENCOUNTER — ANCILLARY PROCEDURE (OUTPATIENT)
Dept: RADIOLOGY | Facility: CLINIC | Age: 33
End: 2023-11-27
Payer: COMMERCIAL

## 2023-11-27 VITALS — WEIGHT: 139 LBS | DIASTOLIC BLOOD PRESSURE: 87 MMHG | SYSTOLIC BLOOD PRESSURE: 126 MMHG | BODY MASS INDEX: 26.26 KG/M2

## 2023-11-27 DIAGNOSIS — O26.20 RECURRENT PREGNANCY LOSS, CURRENTLY PREGNANT (HHS-HCC): ICD-10-CM

## 2023-11-27 DIAGNOSIS — O24.419: ICD-10-CM

## 2023-11-27 DIAGNOSIS — O34.30: ICD-10-CM

## 2023-11-27 DIAGNOSIS — Z67.91 RH NEGATIVE STATE IN ANTEPARTUM PERIOD, THIRD TRIMESTER (HHS-HCC): ICD-10-CM

## 2023-11-27 DIAGNOSIS — O24.410 DIET CONTROLLED GESTATIONAL DIABETES MELLITUS (GDM) IN THIRD TRIMESTER (HHS-HCC): ICD-10-CM

## 2023-11-27 DIAGNOSIS — O99.283 THYROID DISEASE DURING PREGNANCY IN THIRD TRIMESTER (HHS-HCC): ICD-10-CM

## 2023-11-27 DIAGNOSIS — O34.33: ICD-10-CM

## 2023-11-27 DIAGNOSIS — E07.9 THYROID DISEASE DURING PREGNANCY IN THIRD TRIMESTER (HHS-HCC): ICD-10-CM

## 2023-11-27 DIAGNOSIS — O40.9XX0 POLYHYDRAMNIOS AFFECTING PREGNANCY (HHS-HCC): ICD-10-CM

## 2023-11-27 DIAGNOSIS — O09.819 PREGNANCY CONCEIVED THROUGH IN VITRO FERTILIZATION (HHS-HCC): ICD-10-CM

## 2023-11-27 DIAGNOSIS — O26.893 RH NEGATIVE STATE IN ANTEPARTUM PERIOD, THIRD TRIMESTER (HHS-HCC): ICD-10-CM

## 2023-11-27 DIAGNOSIS — O09.813 PREGNANCY RESULTING FROM IN VITRO FERTILIZATION IN THIRD TRIMESTER (HHS-HCC): ICD-10-CM

## 2023-11-27 DIAGNOSIS — Z34.90 PREGNANT (HHS-HCC): ICD-10-CM

## 2023-11-27 PROBLEM — Z83.1: Status: ACTIVE | Noted: 2023-11-27

## 2023-11-27 PROCEDURE — 76819 FETAL BIOPHYS PROFIL W/O NST: CPT | Performed by: OBSTETRICS & GYNECOLOGY

## 2023-11-27 PROCEDURE — 76819 FETAL BIOPHYS PROFIL W/O NST: CPT

## 2023-11-27 PROCEDURE — 99213 OFFICE O/P EST LOW 20 MIN: CPT | Performed by: OBSTETRICS & GYNECOLOGY

## 2023-11-27 PROCEDURE — 76816 OB US FOLLOW-UP PER FETUS: CPT

## 2023-11-27 PROCEDURE — 76816 OB US FOLLOW-UP PER FETUS: CPT | Performed by: OBSTETRICS & GYNECOLOGY

## 2023-11-27 NOTE — PROGRESS NOTES
[facility] Follow-up  2023         SUBJECTIVE    HPI: Elidia Lindsay is a 33 y.o.  at 35w4d here for visit in the setting of 's illness and possible c/f Brucellosis exposure.      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  Patient has not had fevers, myaglias, joint pain or other symptoms suggestive of infection herself and does not have known high risk exposure as patient has not had sex in many months in the setting of cerclage and no blood exposures that she is aware of.      OBJECTIVE    Visit Vitals  /87   Wt 63 kg (139 lb)   LMP 2023   BMI 26.26 kg/m²   OB Status Pregnant   Smoking Status Never   BSA 1.65 m²   Gen: NAD  Pulm: normal respiratory effort  CV: normal rate  Abd: soft, gravid, nontender    US today with normal growth     ASSESSMENT & PLAN    Elidia Lindsay is a 33 y.o.  at 35w4d here for the following concerns we addressed today:    Family history of brucellosis  As patient is without symptoms and without high risk exposure, will defer post exposure prophylaxis or testing at this time.  Patient to notify us if she becomes symptomatic or if  has confirmed diagnosis.  In either event, will curbside ID in the interim for recommendations.         Orders Placed This Encounter   Procedures    Urine culture     Order Specific Question:   Release result to [facility]     Answer:   Immediate [1]        RTC as scheduled for previously scheduled prenatal visit     Patient seen and evaluated with Dr. Shekhar Gaxiola MD

## 2023-11-27 NOTE — ASSESSMENT & PLAN NOTE
As patient is without symptoms and without high risk exposure, will defer post exposure prophylaxis or testing at this time.  Patient to notify us if she becomes symptomatic or if  has confirmed diagnosis.  In either event, will curbside ID in the interim for recommendations.

## 2023-11-30 ENCOUNTER — ALLIED HEALTH (OUTPATIENT)
Dept: INTEGRATIVE MEDICINE | Facility: CLINIC | Age: 33
End: 2023-11-30

## 2023-11-30 DIAGNOSIS — O09.813 PREGNANCY RESULTING FROM IN VITRO FERTILIZATION IN THIRD TRIMESTER (HHS-HCC): Primary | ICD-10-CM

## 2023-11-30 PROCEDURE — 97810 ACUP 1/> WO ESTIM 1ST 15 MIN: CPT | Performed by: ACUPUNCTURIST

## 2023-11-30 PROCEDURE — 97811 ACUP 1/> W/O ESTIM EA ADD 15: CPT | Performed by: ACUPUNCTURIST

## 2023-11-30 NOTE — PROGRESS NOTES
Acupuncture Visit:     Subjective   Patient ID: Elidia Lindsay is a 33 y.o. female who presents for Pregnancy Support    Pregnancy support:  She is 36 weeks pregnant.  She had the cerclage removed last Friday.  She is feeling good overall and ready for baby.  Some discomfort in the cervix area and feels like baby is low in her pelvis.        Session Information  Is this acupuncture treatment being billed to the patient's insurance company: No  Visit Type: Follow-up visit  Medical History Reviewed: I have reviewed pertinent medical history in EHR, and no contraindications are present to provide treatment         Review of Systems   Sleep: very difficult    Provider reviewed plan for the acupuncture session, precautions and contraindications. Patient/guardian/hospital staff has given consent to treat with full understanding of what to expect during the session. Before acupuncture began, provider explained to the patient to communicate at any time if the procedure was causing discomfort past their tolerance level. Patient agreed to advise acupuncturist. The acupuncturist counseled the patient on the risks of acupuncture treatment including pain, infection, bleeding, and no relief of pain. The patient was positioned comfortably. There was no evidence of infection at the site of needle insertions.    Objective   Physical Exam              Acupuncture Treatment  Patient Position: Lateral recumbent on a table (left side)  Acupuncture Needling: Yes  Needle Guage: 36 guage /.20/ Blue seirin  Body Points: With retention  Body Points - Left: P 6, KD 3, SP 6  Body Points - Bilateral: Du 20, UB 20, UB 23  Body Points - Right: GB 34, ST 36, SOBEIDA 3, SJ 5, LI 11  Auricular Points: No  Electroacupuncture Used: No  Other Techniques Utilized: Ear seeds, TDP Lamp  Ear Seeds: Stainless steel (shenmen)  TDP Lamp Descripton: feet  Needle Count In: 13  Needle Count Out: 13  Needle Retention Time (min): 25 minutes  Total Face to Face Time  (min): 25 minutes              Assessment/Plan   Diagnoses and all orders for this visit:  Pregnancy resulting from in vitro fertilization in third trimester

## 2023-12-01 ENCOUNTER — ROUTINE PRENATAL (OUTPATIENT)
Dept: MATERNAL FETAL MEDICINE | Facility: CLINIC | Age: 33
End: 2023-12-01
Payer: COMMERCIAL

## 2023-12-01 ENCOUNTER — CLINICAL SUPPORT (OUTPATIENT)
Dept: OBSTETRICS AND GYNECOLOGY | Facility: CLINIC | Age: 33
End: 2023-12-01
Payer: COMMERCIAL

## 2023-12-01 VITALS — WEIGHT: 138.3 LBS | SYSTOLIC BLOOD PRESSURE: 122 MMHG | BODY MASS INDEX: 26.13 KG/M2 | DIASTOLIC BLOOD PRESSURE: 84 MMHG

## 2023-12-01 DIAGNOSIS — E07.9 THYROID DISEASE DURING PREGNANCY IN THIRD TRIMESTER (HHS-HCC): ICD-10-CM

## 2023-12-01 DIAGNOSIS — O09.813 PREGNANCY RESULTING FROM IN VITRO FERTILIZATION IN THIRD TRIMESTER (HHS-HCC): ICD-10-CM

## 2023-12-01 DIAGNOSIS — O24.410 DIET CONTROLLED GESTATIONAL DIABETES MELLITUS (GDM) IN THIRD TRIMESTER (HHS-HCC): ICD-10-CM

## 2023-12-01 DIAGNOSIS — Z67.91 RH NEGATIVE STATE IN ANTEPARTUM PERIOD, THIRD TRIMESTER (HHS-HCC): ICD-10-CM

## 2023-12-01 DIAGNOSIS — Z3A.36 36 WEEKS GESTATION OF PREGNANCY (HHS-HCC): Primary | ICD-10-CM

## 2023-12-01 DIAGNOSIS — O99.283 THYROID DISEASE DURING PREGNANCY IN THIRD TRIMESTER (HHS-HCC): ICD-10-CM

## 2023-12-01 DIAGNOSIS — Z83.1: ICD-10-CM

## 2023-12-01 DIAGNOSIS — O34.30: ICD-10-CM

## 2023-12-01 DIAGNOSIS — O34.33: ICD-10-CM

## 2023-12-01 DIAGNOSIS — O26.20 RECURRENT PREGNANCY LOSS, CURRENTLY PREGNANT (HHS-HCC): ICD-10-CM

## 2023-12-01 DIAGNOSIS — O26.893 RH NEGATIVE STATE IN ANTEPARTUM PERIOD, THIRD TRIMESTER (HHS-HCC): ICD-10-CM

## 2023-12-01 PROCEDURE — 99214 OFFICE O/P EST MOD 30 MIN: CPT | Mod: 25 | Performed by: OBSTETRICS & GYNECOLOGY

## 2023-12-01 PROCEDURE — 59025 FETAL NON-STRESS TEST: CPT | Performed by: OBSTETRICS & GYNECOLOGY

## 2023-12-01 PROCEDURE — 87081 CULTURE SCREEN ONLY: CPT | Performed by: OBSTETRICS & GYNECOLOGY

## 2023-12-01 PROCEDURE — 99214 OFFICE O/P EST MOD 30 MIN: CPT | Performed by: OBSTETRICS & GYNECOLOGY

## 2023-12-01 NOTE — PROGRESS NOTES
Elidia Lindsay is a 32 yo  @ 36w1d  with a pregnancy complicated by previable PPROM with a loss at 19 weeks in her last pregnancy, RPL and IVF conception. She has a cerclage in situ.      Overall doing well. +FM. Only rare contractions, no LOF.      Has been checking her BG values 4x.  All within goal.     O: /84   Wt 62.7 kg (138 lb 4.8 oz)   LMP 2023   BMI 26.13 kg/m²   Gen: NAD  Resp: nonlabored breathing  Cardiac: good peripheral perfusion  Abd: gravid  Psych: appropriate mood and affect    Reactive NST.     A/P: 32yo  @ 36w1d  presenting for a return OB visit.     1. Hx of previable PPROM with D&E at 21w, PPROM likely around 16w  - History consistent with cervical insufficiency, s/p cerclage at 12 weeks this pregnancy  - Weekly  testing at 28 weeks for patient mental health  - Reviewed implication of taking out cerclage at this time.  Discussed that likely received majority of benefit but earlier removal may increase risk of delivery in late  period though not unreasonable based on reported discomfort.  Following discussion made joint decision to remove today.     2. RPL  - Extensive workup essentially negative.   - APLS labs negative  - Continue baby ASA     3. Subclinical hypothyroidism  - Continue synthroid 12.5 daily.   - Euthyroid on labs . Repeat TSH ordered     4. IVF pregnancy  - Serial growth planned  - Fetal echo normal     5. GDMA1  - Previously discussed risk of fetal hyperbilirubinemia, hyoglycemia, LGA growth pattern and stillbirth  - Pt s/p bootcamp. Checking 4x daily  - All values wnl. Insulin currently not indicated  - Last at 35 weeks AGA (43%ile).  High-normal JEFF 23 (DVP 9)    7. Brucellosis exposure  - Asymptomatic.  - Reviewed with ID will defer ppx or testing unless becomes symptomatic.  (See prior note)    6. PNC  - PNL reviewed and normal.   - Risk reducing cffDNA, XY  - Rh negative s/p Rhogam  - s/p Tdap, flu, COVID, plans RSV   - RTC  weekly for NST and PNV, growth in two weeks  - Planning for delivery at 39 weeks unless other indication for earlier delivery.   - GBS today    Brian Acuña MD  Maternal Fetal Medicine

## 2023-12-01 NOTE — PROCEDURES
Elidia Lindsay, a  at 36w1d with an GEOVANNY of 2023, by Ultrasound, was seen at  [facility] for a nonstress test.    Non-Stress Test   Baseline Fetal Heart Rate for Non-Stress Test: 135 BPM  Variability in Waveform for Non-Stress Test: Moderate  Accelerations in Non-Stress Test: Yes  Decelerations in Non-Stress Test: None  Contractions in Non-Stress Test: Irregular  NST Contraction Frequency: every 10 minutes  Acoustic Stimulator for Non-Stress Test: No  Interpretation of Non-Stress Test   Interpretation of Non-Stress Test: Reactive

## 2023-12-03 LAB — GP B STREP GENITAL QL CULT: ABNORMAL

## 2023-12-06 ENCOUNTER — ALLIED HEALTH (OUTPATIENT)
Dept: INTEGRATIVE MEDICINE | Facility: CLINIC | Age: 33
End: 2023-12-06

## 2023-12-06 DIAGNOSIS — O09.813 PREGNANCY RESULTING FROM IN VITRO FERTILIZATION IN THIRD TRIMESTER (HHS-HCC): Primary | ICD-10-CM

## 2023-12-06 PROCEDURE — 97811 ACUP 1/> W/O ESTIM EA ADD 15: CPT | Performed by: ACUPUNCTURIST

## 2023-12-06 PROCEDURE — 97810 ACUP 1/> WO ESTIM 1ST 15 MIN: CPT | Performed by: ACUPUNCTURIST

## 2023-12-06 NOTE — PROGRESS NOTES
Acupuncture Visit:     Subjective   Patient ID: Elidia Lindsay is a 33 y.o. female who presents for Pregnancy Support    Pregnancy support:  She is 37 weeks pregnant.  Feeling ready for baby to come.  No issues.          Session Information  Is this acupuncture treatment being billed to the patient's insurance company: No  Visit Type: Follow-up visit  Medical History Reviewed: I have reviewed pertinent medical history in EHR, and no contraindications are present to provide treatment         Review of Systems       Provider reviewed plan for the acupuncture session, precautions and contraindications. Patient/guardian/hospital staff has given consent to treat with full understanding of what to expect during the session. Before acupuncture began, provider explained to the patient to communicate at any time if the procedure was causing discomfort past their tolerance level. Patient agreed to advise acupuncturist. The acupuncturist counseled the patient on the risks of acupuncture treatment including pain, infection, bleeding, and no relief of pain. The patient was positioned comfortably. There was no evidence of infection at the site of needle insertions.    Objective   Physical Exam              Acupuncture Treatment  Patient Position: Seated and reclining  Acupuncture Needling: Yes  Needle Guage: 36 guage /.20/ Blue seirin  Body Points: With retention  Body Points - Left: GB 41  Body Points - Bilateral: GB 21, LI 4, SJ 5, SP 10, ST 36, GB 34, SP 6, KD 3, SOBEIDA 3  Auricular Points: No  Electroacupuncture Used: No  Other Techniques Utilized: Ear seeds, TDP Lamp  Ear Seeds: Stainless steel (shenmen)  TDP Lamp Descripton: feet  Needle Count In: 19  Needle Count Out: 19  Needle Retention Time (min): 25 minutes  Total Face to Face Time (min): 25 minutes              Assessment/Plan   Diagnoses and all orders for this visit:  Pregnancy resulting from in vitro fertilization in third trimester

## 2023-12-08 ENCOUNTER — CLINICAL SUPPORT (OUTPATIENT)
Dept: OBSTETRICS AND GYNECOLOGY | Facility: CLINIC | Age: 33
End: 2023-12-08
Payer: COMMERCIAL

## 2023-12-08 ENCOUNTER — ROUTINE PRENATAL (OUTPATIENT)
Dept: MATERNAL FETAL MEDICINE | Facility: CLINIC | Age: 33
End: 2023-12-08
Payer: COMMERCIAL

## 2023-12-08 ENCOUNTER — DOCUMENTATION (OUTPATIENT)
Dept: OBSTETRICS AND GYNECOLOGY | Facility: CLINIC | Age: 33
End: 2023-12-08

## 2023-12-08 VITALS — DIASTOLIC BLOOD PRESSURE: 81 MMHG | BODY MASS INDEX: 26.24 KG/M2 | WEIGHT: 138.9 LBS | SYSTOLIC BLOOD PRESSURE: 131 MMHG

## 2023-12-08 DIAGNOSIS — Z33.1 PREGNANT STATE, INCIDENTAL (HHS-HCC): Primary | ICD-10-CM

## 2023-12-08 DIAGNOSIS — O24.410 DIET CONTROLLED GESTATIONAL DIABETES MELLITUS (GDM) IN THIRD TRIMESTER (HHS-HCC): ICD-10-CM

## 2023-12-08 DIAGNOSIS — O09.813 PREGNANCY RESULTING FROM IN VITRO FERTILIZATION IN THIRD TRIMESTER (HHS-HCC): ICD-10-CM

## 2023-12-08 PROCEDURE — 59025 FETAL NON-STRESS TEST: CPT | Performed by: STUDENT IN AN ORGANIZED HEALTH CARE EDUCATION/TRAINING PROGRAM

## 2023-12-08 PROCEDURE — 99214 OFFICE O/P EST MOD 30 MIN: CPT | Mod: 25 | Performed by: STUDENT IN AN ORGANIZED HEALTH CARE EDUCATION/TRAINING PROGRAM

## 2023-12-08 PROCEDURE — 99214 OFFICE O/P EST MOD 30 MIN: CPT | Performed by: STUDENT IN AN ORGANIZED HEALTH CARE EDUCATION/TRAINING PROGRAM

## 2023-12-08 NOTE — SIGNIFICANT EVENT
12/08/23 1721   Non-Stress Test    Variability in Waveform for Non-Stress Test 6-25 BPM   Accelerations in Non-Stress Test Yes   Decelerations in Non-Stress Test None   Contractions in Non-Stress Test Irregular   Acoustic Stimulator for Non-Stress Test No   Interpretation of Non-Stress Test    Interpretation of Non-Stress Test Reactive     Darin Rojas MD  Maternal-Fetal Medicine

## 2023-12-08 NOTE — PROGRESS NOTES
Elidia Lindsay is a 34 yo  @ 37w1d  with a pregnancy complicated by previable PPROM with a loss at 19 weeks in her last pregnancy, RPL and IVF conception. She is s/p cerclage removal.     Overall doing well. +FM. Having intermittent CTX. No other concerns.     Has been checking her BG values 4x with >80% within goal.     O: /84   Wt 62.7 kg (138 lb 4.8 oz)   LMP 2023   BMI 26.13 kg/m²   Gen: NAD  Resp: nonlabored breathing  Cardiac: good peripheral perfusion  Abd: gravid  Psych: appropriate mood and affect     Reactive NST.     A/P: 32yo  @ 37w1d  presenting for a return OB visit.      1. Hx of previable PPROM with D&E at 21w, PPROM likely around 16w  - History consistent with cervical insufficiency, s/p cerclage at 12 weeks this pregnancy  - Weekly  testing at 28 weeks for patient mental health  - Cerclage removed at last visit     2. RPL  - Extensive workup essentially negative.   - APLS labs negative  - Continue baby ASA     3. Subclinical hypothyroidism  - Continue synthroid 12.5 daily.   - Euthyroid on labs      4. IVF pregnancy  - Serial growth planned  - Fetal echo normal     5. GDMA1  - Previously discussed risk of fetal hyperbilirubinemia, hyoglycemia, LGA growth pattern and stillbirth  - Pt s/p bootcamp. Checking 4x daily  - Values overall appropriate. Insulin currently not indicated  - Last at 35 weeks AGA (43%ile).  High-normal JEFF 23 (DVP 9)     7. Brucellosis exposure  - Asymptomatic  - Reviewed with ID will defer ppx or testing unless becomes symptomatic.  (See prior note)     6. PNC  - PNL reviewed and normal.   - Risk reducing cffDNA, XY  - Rh negative s/p Rhogam  - s/p Tdap, flu, COVID, plans RSV   - RTC weekly for NST and PNV, growth in once week  - Planning for delivery at 39 weeks unless other indication for earlier delivery. Will order IOL today  - GBS positive     Darin Rojas MD  Maternal-Fetal Medicine

## 2023-12-08 NOTE — PROGRESS NOTES
Communicated with the patient on 12/8/2023  She has Gestational Diabetes @ 37w1d     The patient checks her sugars fasting and 1 hour after meals. Her current regimen is as follows:  nutrition plan     The patient's reported blood sugars appear well controlled, with 80% within the goal range. No changes to her current treatment plan are indicated at this time.    Patient understands to submit sugar log for review weekly through the Blood Sugar Line @ [phone] or via email to [email or web address] to help optimize glucose control.     Met with patient for sugar review in person during NST.   No seizures in last 3 years. Cont. o/p regimen

## 2023-12-13 ENCOUNTER — APPOINTMENT (OUTPATIENT)
Dept: INTEGRATIVE MEDICINE | Facility: CLINIC | Age: 33
End: 2023-12-13
Payer: COMMERCIAL

## 2023-12-15 ENCOUNTER — ANESTHESIA EVENT (OUTPATIENT)
Dept: OBSTETRICS AND GYNECOLOGY | Facility: HOSPITAL | Age: 33
End: 2023-12-15
Payer: COMMERCIAL

## 2023-12-15 ENCOUNTER — ALLIED HEALTH (OUTPATIENT)
Dept: INTEGRATIVE MEDICINE | Facility: CLINIC | Age: 33
End: 2023-12-15

## 2023-12-15 ENCOUNTER — ANESTHESIA (OUTPATIENT)
Dept: OBSTETRICS AND GYNECOLOGY | Facility: HOSPITAL | Age: 33
End: 2023-12-15
Payer: COMMERCIAL

## 2023-12-15 ENCOUNTER — HOSPITAL ENCOUNTER (INPATIENT)
Facility: HOSPITAL | Age: 33
LOS: 4 days | Discharge: HOME | End: 2023-12-19
Attending: STUDENT IN AN ORGANIZED HEALTH CARE EDUCATION/TRAINING PROGRAM | Admitting: STUDENT IN AN ORGANIZED HEALTH CARE EDUCATION/TRAINING PROGRAM
Payer: COMMERCIAL

## 2023-12-15 ENCOUNTER — CLINICAL SUPPORT (OUTPATIENT)
Dept: OBSTETRICS AND GYNECOLOGY | Facility: CLINIC | Age: 33
End: 2023-12-15
Payer: COMMERCIAL

## 2023-12-15 ENCOUNTER — ROUTINE PRENATAL (OUTPATIENT)
Dept: MATERNAL FETAL MEDICINE | Facility: CLINIC | Age: 33
End: 2023-12-15
Payer: COMMERCIAL

## 2023-12-15 VITALS — WEIGHT: 139.2 LBS | DIASTOLIC BLOOD PRESSURE: 90 MMHG | SYSTOLIC BLOOD PRESSURE: 139 MMHG | BODY MASS INDEX: 26.3 KG/M2

## 2023-12-15 DIAGNOSIS — O34.30: ICD-10-CM

## 2023-12-15 DIAGNOSIS — Z67.91 RH NEGATIVE STATE IN ANTEPARTUM PERIOD, THIRD TRIMESTER (HHS-HCC): ICD-10-CM

## 2023-12-15 DIAGNOSIS — E07.9 THYROID DISEASE DURING PREGNANCY IN THIRD TRIMESTER (HHS-HCC): ICD-10-CM

## 2023-12-15 DIAGNOSIS — Z83.1: ICD-10-CM

## 2023-12-15 DIAGNOSIS — O99.283 THYROID DISEASE DURING PREGNANCY IN THIRD TRIMESTER (HHS-HCC): ICD-10-CM

## 2023-12-15 DIAGNOSIS — O09.813 PREGNANCY RESULTING FROM IN VITRO FERTILIZATION IN THIRD TRIMESTER (HHS-HCC): Primary | ICD-10-CM

## 2023-12-15 DIAGNOSIS — J06.9 VIRAL UPPER RESPIRATORY TRACT INFECTION: ICD-10-CM

## 2023-12-15 DIAGNOSIS — O34.33: ICD-10-CM

## 2023-12-15 DIAGNOSIS — O09.813 PREGNANCY RESULTING FROM IN VITRO FERTILIZATION IN THIRD TRIMESTER (HHS-HCC): ICD-10-CM

## 2023-12-15 DIAGNOSIS — O26.893 RH NEGATIVE STATE IN ANTEPARTUM PERIOD, THIRD TRIMESTER (HHS-HCC): ICD-10-CM

## 2023-12-15 DIAGNOSIS — O26.20 RECURRENT PREGNANCY LOSS, CURRENTLY PREGNANT (HHS-HCC): ICD-10-CM

## 2023-12-15 DIAGNOSIS — Z3A.38 38 WEEKS GESTATION OF PREGNANCY (HHS-HCC): ICD-10-CM

## 2023-12-15 DIAGNOSIS — O14.13 SEVERE PRE-ECLAMPSIA IN THIRD TRIMESTER (HHS-HCC): Primary | ICD-10-CM

## 2023-12-15 DIAGNOSIS — Z3A.38 38 WEEKS GESTATION OF PREGNANCY (HHS-HCC): Primary | ICD-10-CM

## 2023-12-15 DIAGNOSIS — O24.410 DIET CONTROLLED GESTATIONAL DIABETES MELLITUS (GDM) IN THIRD TRIMESTER (HHS-HCC): ICD-10-CM

## 2023-12-15 PROBLEM — Z98.890 HISTORY OF GENERAL ANESTHESIA: Status: ACTIVE | Noted: 2023-12-15

## 2023-12-15 PROBLEM — E03.9 HYPOTHYROIDISM: Status: ACTIVE | Noted: 2023-12-15

## 2023-12-15 LAB
ABO GROUP (TYPE) IN BLOOD: NORMAL
ANTIBODY SCREEN: NORMAL
ERYTHROCYTE [DISTWIDTH] IN BLOOD BY AUTOMATED COUNT: 13.4 % (ref 11.5–14.5)
GLUCOSE BLD MANUAL STRIP-MCNC: 76 MG/DL (ref 74–99)
GLUCOSE BLD MANUAL STRIP-MCNC: 90 MG/DL (ref 74–99)
GLUCOSE BLD MANUAL STRIP-MCNC: 92 MG/DL (ref 74–99)
HCT VFR BLD AUTO: 37.5 % (ref 36–46)
HGB BLD-MCNC: 13.1 G/DL (ref 12–16)
HOLD SPECIMEN: NORMAL
MCH RBC QN AUTO: 32.6 PG (ref 26–34)
MCHC RBC AUTO-ENTMCNC: 34.9 G/DL (ref 32–36)
MCV RBC AUTO: 93 FL (ref 80–100)
NRBC BLD-RTO: 0 /100 WBCS (ref 0–0)
PLATELET # BLD AUTO: 131 X10*3/UL (ref 150–450)
RBC # BLD AUTO: 4.02 X10*6/UL (ref 4–5.2)
RH FACTOR (ANTIGEN D): NORMAL
WBC # BLD AUTO: 7.6 X10*3/UL (ref 4.4–11.3)

## 2023-12-15 PROCEDURE — 36415 COLL VENOUS BLD VENIPUNCTURE: CPT | Performed by: STUDENT IN AN ORGANIZED HEALTH CARE EDUCATION/TRAINING PROGRAM

## 2023-12-15 PROCEDURE — 59025 FETAL NON-STRESS TEST: CPT | Performed by: OBSTETRICS & GYNECOLOGY

## 2023-12-15 PROCEDURE — 85027 COMPLETE CBC AUTOMATED: CPT | Performed by: STUDENT IN AN ORGANIZED HEALTH CARE EDUCATION/TRAINING PROGRAM

## 2023-12-15 PROCEDURE — 86780 TREPONEMA PALLIDUM: CPT | Performed by: STUDENT IN AN ORGANIZED HEALTH CARE EDUCATION/TRAINING PROGRAM

## 2023-12-15 PROCEDURE — 3E0P7VZ INTRODUCTION OF HORMONE INTO FEMALE REPRODUCTIVE, VIA NATURAL OR ARTIFICIAL OPENING: ICD-10-PCS | Performed by: OBSTETRICS & GYNECOLOGY

## 2023-12-15 PROCEDURE — 1120000001 HC OB PRIVATE ROOM DAILY

## 2023-12-15 PROCEDURE — 82947 ASSAY GLUCOSE BLOOD QUANT: CPT

## 2023-12-15 PROCEDURE — 2500000005 HC RX 250 GENERAL PHARMACY W/O HCPCS

## 2023-12-15 PROCEDURE — 51701 INSERT BLADDER CATHETER: CPT

## 2023-12-15 PROCEDURE — 2500000004 HC RX 250 GENERAL PHARMACY W/ HCPCS (ALT 636 FOR OP/ED): Performed by: STUDENT IN AN ORGANIZED HEALTH CARE EDUCATION/TRAINING PROGRAM

## 2023-12-15 PROCEDURE — 01967 NEURAXL LBR ANES VAG DLVR: CPT | Performed by: ANESTHESIOLOGY

## 2023-12-15 PROCEDURE — 59050 FETAL MONITOR W/REPORT: CPT

## 2023-12-15 PROCEDURE — 86870 RBC ANTIBODY IDENTIFICATION: CPT

## 2023-12-15 PROCEDURE — 10907ZC DRAINAGE OF AMNIOTIC FLUID, THERAPEUTIC FROM PRODUCTS OF CONCEPTION, VIA NATURAL OR ARTIFICIAL OPENING: ICD-10-PCS

## 2023-12-15 PROCEDURE — 97811 ACUP 1/> W/O ESTIM EA ADD 15: CPT | Performed by: ACUPUNCTURIST

## 2023-12-15 PROCEDURE — 2500000001 HC RX 250 WO HCPCS SELF ADMINISTERED DRUGS (ALT 637 FOR MEDICARE OP): Performed by: STUDENT IN AN ORGANIZED HEALTH CARE EDUCATION/TRAINING PROGRAM

## 2023-12-15 PROCEDURE — 3700000002 HC GENERAL ANESTHESIA TIME - EACH INCREMENTAL 1 MINUTE: Performed by: ANESTHESIOLOGY

## 2023-12-15 PROCEDURE — 97810 ACUP 1/> WO ESTIM 1ST 15 MIN: CPT | Performed by: ACUPUNCTURIST

## 2023-12-15 PROCEDURE — 86077 PHYS BLOOD BANK SERV XMATCH: CPT | Performed by: STUDENT IN AN ORGANIZED HEALTH CARE EDUCATION/TRAINING PROGRAM

## 2023-12-15 PROCEDURE — 86901 BLOOD TYPING SEROLOGIC RH(D): CPT | Performed by: STUDENT IN AN ORGANIZED HEALTH CARE EDUCATION/TRAINING PROGRAM

## 2023-12-15 PROCEDURE — 99222 1ST HOSP IP/OBS MODERATE 55: CPT | Performed by: STUDENT IN AN ORGANIZED HEALTH CARE EDUCATION/TRAINING PROGRAM

## 2023-12-15 PROCEDURE — 99215 OFFICE O/P EST HI 40 MIN: CPT | Performed by: OBSTETRICS & GYNECOLOGY

## 2023-12-15 PROCEDURE — 3700000001 HC GENERAL ANESTHESIA TIME - INITIAL BASE CHARGE: Performed by: ANESTHESIOLOGY

## 2023-12-15 RX ORDER — LOPERAMIDE HYDROCHLORIDE 2 MG/1
4 CAPSULE ORAL EVERY 2 HOUR PRN
Status: DISCONTINUED | OUTPATIENT
Start: 2023-12-15 | End: 2023-12-17

## 2023-12-15 RX ORDER — ONDANSETRON HYDROCHLORIDE 2 MG/ML
4 INJECTION, SOLUTION INTRAVENOUS EVERY 6 HOURS PRN
Status: DISCONTINUED | OUTPATIENT
Start: 2023-12-15 | End: 2023-12-17

## 2023-12-15 RX ORDER — NALBUPHINE HYDROCHLORIDE 10 MG/ML
10 INJECTION, SOLUTION INTRAMUSCULAR; INTRAVENOUS; SUBCUTANEOUS
Status: DISCONTINUED | OUTPATIENT
Start: 2023-12-15 | End: 2023-12-17

## 2023-12-15 RX ORDER — BUTORPHANOL TARTRATE 1 MG/ML
1 INJECTION INTRAMUSCULAR; INTRAVENOUS EVERY 10 MIN PRN
Status: DISCONTINUED | OUTPATIENT
Start: 2023-12-15 | End: 2023-12-17

## 2023-12-15 RX ORDER — OXYTOCIN/0.9 % SODIUM CHLORIDE 30/500 ML
2-30 PLASTIC BAG, INJECTION (ML) INTRAVENOUS CONTINUOUS
Status: DISCONTINUED | OUTPATIENT
Start: 2023-12-15 | End: 2023-12-17

## 2023-12-15 RX ORDER — METOCLOPRAMIDE 10 MG/1
10 TABLET ORAL EVERY 6 HOURS PRN
Status: DISCONTINUED | OUTPATIENT
Start: 2023-12-15 | End: 2023-12-17

## 2023-12-15 RX ORDER — TERBUTALINE SULFATE 1 MG/ML
0.25 INJECTION SUBCUTANEOUS ONCE AS NEEDED
Status: DISCONTINUED | OUTPATIENT
Start: 2023-12-15 | End: 2023-12-17

## 2023-12-15 RX ORDER — NIFEDIPINE 10 MG/1
10 CAPSULE ORAL ONCE AS NEEDED
Status: DISCONTINUED | OUTPATIENT
Start: 2023-12-15 | End: 2023-12-17

## 2023-12-15 RX ORDER — METOCLOPRAMIDE HYDROCHLORIDE 5 MG/ML
10 INJECTION INTRAMUSCULAR; INTRAVENOUS EVERY 6 HOURS PRN
Status: DISCONTINUED | OUTPATIENT
Start: 2023-12-15 | End: 2023-12-17

## 2023-12-15 RX ORDER — LIDOCAINE HYDROCHLORIDE 10 MG/ML
30 INJECTION INFILTRATION; PERINEURAL ONCE AS NEEDED
Status: DISCONTINUED | OUTPATIENT
Start: 2023-12-15 | End: 2023-12-17

## 2023-12-15 RX ORDER — SODIUM CHLORIDE, SODIUM LACTATE, POTASSIUM CHLORIDE, CALCIUM CHLORIDE 600; 310; 30; 20 MG/100ML; MG/100ML; MG/100ML; MG/100ML
125 INJECTION, SOLUTION INTRAVENOUS CONTINUOUS
Status: DISCONTINUED | OUTPATIENT
Start: 2023-12-15 | End: 2023-12-17

## 2023-12-15 RX ORDER — FENTANYL/BUPIVACAINE/NS/PF 2MCG/ML-.1
PLASTIC BAG, INJECTION (ML) INJECTION CONTINUOUS PRN
Status: DISCONTINUED | OUTPATIENT
Start: 2023-12-15 | End: 2023-12-16

## 2023-12-15 RX ORDER — MISOPROSTOL 200 UG/1
800 TABLET ORAL ONCE AS NEEDED
Status: COMPLETED | OUTPATIENT
Start: 2023-12-15 | End: 2023-12-16

## 2023-12-15 RX ORDER — INSULIN LISPRO 100 [IU]/ML
0-10 INJECTION, SOLUTION INTRAVENOUS; SUBCUTANEOUS EVERY 4 HOURS
Status: DISCONTINUED | OUTPATIENT
Start: 2023-12-15 | End: 2023-12-17

## 2023-12-15 RX ORDER — FENTANYL/BUPIVACAINE/NS/PF 2MCG/ML-.1
PLASTIC BAG, INJECTION (ML) INJECTION AS NEEDED
Status: DISCONTINUED | OUTPATIENT
Start: 2023-12-15 | End: 2023-12-16

## 2023-12-15 RX ORDER — DEXTROSE 40 %
15 GEL (GRAM) ORAL
Status: DISCONTINUED | OUTPATIENT
Start: 2023-12-15 | End: 2023-12-17

## 2023-12-15 RX ORDER — LABETALOL HYDROCHLORIDE 5 MG/ML
20 INJECTION, SOLUTION INTRAVENOUS ONCE AS NEEDED
Status: DISCONTINUED | OUTPATIENT
Start: 2023-12-15 | End: 2023-12-17

## 2023-12-15 RX ORDER — OXYTOCIN 10 [USP'U]/ML
10 INJECTION, SOLUTION INTRAMUSCULAR; INTRAVENOUS ONCE AS NEEDED
Status: DISCONTINUED | OUTPATIENT
Start: 2023-12-15 | End: 2023-12-17

## 2023-12-15 RX ORDER — PENICILLIN G 3000000 [IU]/50ML
3 INJECTION, SOLUTION INTRAVENOUS EVERY 4 HOURS
Status: DISCONTINUED | OUTPATIENT
Start: 2023-12-15 | End: 2023-12-17

## 2023-12-15 RX ORDER — INSULIN LISPRO 100 [IU]/ML
0-10 INJECTION, SOLUTION INTRAVENOUS; SUBCUTANEOUS
Status: DISCONTINUED | OUTPATIENT
Start: 2023-12-15 | End: 2023-12-15

## 2023-12-15 RX ORDER — CARBOPROST TROMETHAMINE 250 UG/ML
250 INJECTION, SOLUTION INTRAMUSCULAR ONCE AS NEEDED
Status: COMPLETED | OUTPATIENT
Start: 2023-12-15 | End: 2023-12-16

## 2023-12-15 RX ORDER — DEXTROSE 40 %
30 GEL (GRAM) ORAL
Status: DISCONTINUED | OUTPATIENT
Start: 2023-12-15 | End: 2023-12-17

## 2023-12-15 RX ORDER — ONDANSETRON 4 MG/1
4 TABLET, FILM COATED ORAL EVERY 6 HOURS PRN
Status: DISCONTINUED | OUTPATIENT
Start: 2023-12-15 | End: 2023-12-17

## 2023-12-15 RX ORDER — DEXTROSE 50 % IN WATER (D50W) INTRAVENOUS SYRINGE
25
Status: DISCONTINUED | OUTPATIENT
Start: 2023-12-15 | End: 2023-12-17

## 2023-12-15 RX ORDER — HYDRALAZINE HYDROCHLORIDE 20 MG/ML
5 INJECTION INTRAMUSCULAR; INTRAVENOUS ONCE AS NEEDED
Status: DISCONTINUED | OUTPATIENT
Start: 2023-12-15 | End: 2023-12-17

## 2023-12-15 RX ORDER — ALPRAZOLAM 0.25 MG/1
0.5 TABLET ORAL 3 TIMES DAILY PRN
Status: DISCONTINUED | OUTPATIENT
Start: 2023-12-15 | End: 2023-12-15

## 2023-12-15 RX ORDER — OXYTOCIN/0.9 % SODIUM CHLORIDE 30/500 ML
60 PLASTIC BAG, INJECTION (ML) INTRAVENOUS ONCE AS NEEDED
Status: DISCONTINUED | OUTPATIENT
Start: 2023-12-15 | End: 2023-12-17

## 2023-12-15 RX ORDER — LEVOTHYROXINE SODIUM 25 UG/1
25 TABLET ORAL DAILY
Status: DISCONTINUED | OUTPATIENT
Start: 2023-12-15 | End: 2023-12-16

## 2023-12-15 RX ORDER — TRANEXAMIC ACID 100 MG/ML
1000 INJECTION, SOLUTION INTRAVENOUS ONCE AS NEEDED
Status: DISCONTINUED | OUTPATIENT
Start: 2023-12-15 | End: 2023-12-17

## 2023-12-15 RX ADMIN — PENICILLIN G POTASSIUM 5 MILLION UNITS: 5000000 INJECTION, POWDER, FOR SOLUTION INTRAMUSCULAR; INTRAVENOUS at 13:20

## 2023-12-15 RX ADMIN — Medication 14 ML/HR: at 18:50

## 2023-12-15 RX ADMIN — ONDANSETRON 4 MG: 2 INJECTION INTRAMUSCULAR; INTRAVENOUS at 17:06

## 2023-12-15 RX ADMIN — PENICILLIN G 3 MILLION UNITS: 3000000 INJECTION, SOLUTION INTRAVENOUS at 21:25

## 2023-12-15 RX ADMIN — PENICILLIN G 3 MILLION UNITS: 3000000 INJECTION, SOLUTION INTRAVENOUS at 17:33

## 2023-12-15 RX ADMIN — MISOPROSTOL 25 MCG: 100 TABLET ORAL at 14:24

## 2023-12-15 RX ADMIN — SODIUM CHLORIDE, POTASSIUM CHLORIDE, SODIUM LACTATE AND CALCIUM CHLORIDE 125 ML/HR: 600; 310; 30; 20 INJECTION, SOLUTION INTRAVENOUS at 13:19

## 2023-12-15 RX ADMIN — Medication 10 ML: at 18:48

## 2023-12-15 RX ADMIN — SODIUM CHLORIDE, POTASSIUM CHLORIDE, SODIUM LACTATE AND CALCIUM CHLORIDE 500 ML: 600; 310; 30; 20 INJECTION, SOLUTION INTRAVENOUS at 18:31

## 2023-12-15 SDOH — ECONOMIC STABILITY: HOUSING INSECURITY: DO YOU FEEL UNSAFE GOING BACK TO THE PLACE WHERE YOU ARE LIVING?: NO

## 2023-12-15 SDOH — HEALTH STABILITY: MENTAL HEALTH: WERE YOU ABLE TO COMPLETE ALL THE BEHAVIORAL HEALTH SCREENINGS?: YES

## 2023-12-15 SDOH — SOCIAL STABILITY: SOCIAL INSECURITY: PHYSICAL ABUSE: DENIES

## 2023-12-15 SDOH — SOCIAL STABILITY: SOCIAL INSECURITY: HAS ANYONE EVER THREATENED TO HURT YOUR FAMILY OR YOUR PETS?: NO

## 2023-12-15 SDOH — SOCIAL STABILITY: SOCIAL INSECURITY: VERBAL ABUSE: DENIES

## 2023-12-15 SDOH — HEALTH STABILITY: MENTAL HEALTH: WISH TO BE DEAD (PAST 1 MONTH): NO

## 2023-12-15 SDOH — SOCIAL STABILITY: SOCIAL INSECURITY: DOES ANYONE TRY TO KEEP YOU FROM HAVING/CONTACTING OTHER FRIENDS OR DOING THINGS OUTSIDE YOUR HOME?: NO

## 2023-12-15 SDOH — SOCIAL STABILITY: SOCIAL INSECURITY: HAVE YOU HAD THOUGHTS OF HARMING ANYONE ELSE?: NO

## 2023-12-15 SDOH — SOCIAL STABILITY: SOCIAL INSECURITY: DO YOU FEEL ANYONE HAS EXPLOITED OR TAKEN ADVANTAGE OF YOU FINANCIALLY OR OF YOUR PERSONAL PROPERTY?: NO

## 2023-12-15 SDOH — SOCIAL STABILITY: SOCIAL INSECURITY: ABUSE SCREEN: ADULT

## 2023-12-15 SDOH — SOCIAL STABILITY: SOCIAL INSECURITY: ARE THERE ANY APPARENT SIGNS OF INJURIES/BEHAVIORS THAT COULD BE RELATED TO ABUSE/NEGLECT?: NO

## 2023-12-15 SDOH — HEALTH STABILITY: MENTAL HEALTH: HAVE YOU USED ANY PRESCRIPTION DRUGS OTHER THAN PRESCRIBED IN THE PAST 12 MONTHS?: NO

## 2023-12-15 SDOH — HEALTH STABILITY: MENTAL HEALTH: HAVE YOU USED ANY SUBSTANCES (CANABIS, COCAINE, HEROIN, HALLUCINOGENS, INHALANTS, ETC.) IN THE PAST 12 MONTHS?: NO

## 2023-12-15 SDOH — SOCIAL STABILITY: SOCIAL INSECURITY: ARE YOU OR HAVE YOU BEEN THREATENED OR ABUSED PHYSICALLY, EMOTIONALLY, OR SEXUALLY BY ANYONE?: NO

## 2023-12-15 SDOH — HEALTH STABILITY: MENTAL HEALTH: NON-SPECIFIC ACTIVE SUICIDAL THOUGHTS (PAST 1 MONTH): NO

## 2023-12-15 SDOH — HEALTH STABILITY: MENTAL HEALTH: CURRENT SMOKER: 0

## 2023-12-15 SDOH — HEALTH STABILITY: MENTAL HEALTH: SUICIDAL BEHAVIOR (LIFETIME): NO

## 2023-12-15 ASSESSMENT — PAIN SCALES - GENERAL
PAINLEVEL_OUTOF10: 0 - NO PAIN

## 2023-12-15 ASSESSMENT — PATIENT HEALTH QUESTIONNAIRE - PHQ9
2. FEELING DOWN, DEPRESSED OR HOPELESS: NOT AT ALL
SUM OF ALL RESPONSES TO PHQ9 QUESTIONS 1 & 2: 0
1. LITTLE INTEREST OR PLEASURE IN DOING THINGS: NOT AT ALL

## 2023-12-15 ASSESSMENT — LIFESTYLE VARIABLES
SKIP TO QUESTIONS 9-10: 1
SKIP TO QUESTIONS 9-10: 1
HOW OFTEN DO YOU HAVE A DRINK CONTAINING ALCOHOL: NEVER
HOW OFTEN DO YOU HAVE 6 OR MORE DRINKS ON ONE OCCASION: NEVER
AUDIT-C TOTAL SCORE: 0
HOW MANY STANDARD DRINKS CONTAINING ALCOHOL DO YOU HAVE ON A TYPICAL DAY: PATIENT DOES NOT DRINK
HOW MANY STANDARD DRINKS CONTAINING ALCOHOL DO YOU HAVE ON A TYPICAL DAY: PATIENT DOES NOT DRINK
AUDIT-C TOTAL SCORE: 0
HOW OFTEN DO YOU HAVE 6 OR MORE DRINKS ON ONE OCCASION: NEVER
AUDIT-C TOTAL SCORE: 0
HOW OFTEN DO YOU HAVE A DRINK CONTAINING ALCOHOL: NEVER
AUDIT-C TOTAL SCORE: 0

## 2023-12-15 NOTE — ANESTHESIA PREPROCEDURE EVALUATION
Patient: Elidia Lindsay    Evaluation Method: In-person visit    Procedure Information    Date: 12/15/23  Procedure: Labor Analgesia         Relevant Problems   Anesthesia   (+) History of general anesthesia   No history of complications History of anesthesia complications      Cardiovascular  Hx of POTS- improved symptoms       Endocrine   (+) Diet controlled gestational diabetes mellitus (GDM) in third trimester   (+) Hypothyroidism      GI (within normal limits)      /Renal (within normal limits)      Neuro/Psych (within normal limits)      Pulmonary (within normal limits)      GI/Hepatic (within normal limits)      Hematology (within normal limits)      Musculoskeletal (within normal limits)      Infectious Disease (within normal limits)       Clinical information reviewed:    Allergies  Meds               NPO Detail:  No data recorded     OB/Gyn Evaluation    Present Pregnancy    Patient is pregnant now.   Obstetric History                Physical Exam    Airway  Mallampati: III  TM distance: >3 FB  Neck ROM: full     Cardiovascular   Rhythm: regular  Rate: normal     Dental - normal exam     Pulmonary - normal exam     Abdominal            Anesthesia Plan    ASA 3     epidural     The patient is not a current smoker.    Anesthetic plan and risks discussed with patient.  Use of blood products discussed with patient who.    Plan discussed with resident and attending.

## 2023-12-15 NOTE — PROGRESS NOTES
Acupuncture Visit:     Subjective   Patient ID: Elidia Lindsay is a 33 y.o. female who presents for Pregnancy Support and URI    Pregnancy support:  She is 38 weeks pregnant.  She had a lot of contractions after the last acupuncture treatment.  She is feeling vaginal pain and lower back pain this morning. She is scheduled for induction on 12/23.    URI: she has a URI this week.  She has sinus congestion and facial pain.  Mild cough from a tickle in her throat.  No fever.  Negative for COVID.  Still lingering congestion but overall everything is improving.         Session Information  Is this acupuncture treatment being billed to the patient's insurance company: No  Visit Type: Follow-up visit  Medical History Reviewed: I have reviewed pertinent medical history in EHR, and no contraindications are present to provide treatment         Review of Systems  Sleep: she is struggling with sleep due to congestion and pregnancy.   Digestion: soft stool          Provider reviewed plan for the acupuncture session, precautions and contraindications. Patient/guardian/hospital staff has given consent to treat with full understanding of what to expect during the session. Before acupuncture began, provider explained to the patient to communicate at any time if the procedure was causing discomfort past their tolerance level. Patient agreed to advise acupuncturist. The acupuncturist counseled the patient on the risks of acupuncture treatment including pain, infection, bleeding, and no relief of pain. The patient was positioned comfortably. There was no evidence of infection at the site of needle insertions.    Objective   Physical Exam              Acupuncture Treatment  Patient Position: Seated and reclining  Acupuncture Needling: Yes  Needle Guage: 34 guage /.22/ Pink seirin, 40 guage /.16/ Red seirin  Body Points: With retention  Body Points - Bilateral: DU 23, yintang, LI 4, ST 36, GB 34, SP 6, SOBEIDA 3 (stimulation on LI 4 and SP 6  every 10 minutes)  Auricular Points: No  Electroacupuncture Used: No  Other Techniques Utilized: Ear seeds, TDP Lamp  Ear Seeds: Stainless steel (shenmen)  TDP Lamp Descripton: feet  Needle Count In: 12  Needle Count Out: 12  Needle Retention Time (min): 30 minutes  Total Face to Face Time (min): 25 minutes              Assessment/Plan   Diagnoses and all orders for this visit:  Pregnancy resulting from in vitro fertilization in third trimester  Viral upper respiratory tract infection

## 2023-12-15 NOTE — PROGRESS NOTES
Elidia Lindsay is a 32 yo  @ 38w1d with a pregnancy complicated by previable PPROM with a loss at 19 weeks in her last pregnancy, RPL and IVF conception. She is s/p cerclage removal.     Overall doing well. +FM. Having intermittent CTX. No other concerns.  No headahces, vision cahgnes, RUQ pain.     O: /84   Wt 62.7 kg (138 lb 4.8 oz)   LMP 2023   BMI 26.13 kg/m²   Gen: NAD  Resp: nonlabored breathing  Cardiac: good peripheral perfusion  Abd: gravid  Psych: appropriate mood and affect     Reactive NST.     A/P: 34yo  @ 38w1d presenting for a return OB visit.      1. Hx of previable PPROM with D&E at 21w, PPROM likely around 16w  - History consistent with cervical insufficiency, s/p cerclage at 12 weeks this pregnancy  - Weekly  testing at 28 weeks for patient mental health  - Cerclage removed at last visit     2. RPL  - Extensive workup essentially negative.   - APLS labs negative  - Continue baby ASA     3. Subclinical hypothyroidism  - Continue synthroid 12.5 daily.   - Euthyroid on labs      4. IVF pregnancy  - Serial growth planned  - Fetal echo normal     5. GDMA1  - Previously discussed risk of fetal hyperbilirubinemia, hyoglycemia, LGA growth pattern and stillbirth  - Pt s/p bootcamp. Checking 4x daily  - Values overall appropriate. Insulin currently not indicated  - Last at 35 weeks AGA (43%ile).  High-normal JEFF 23 (DVP 9)     7. Brucellosis exposure  - Asymptomatic  - Reviewed with ID will defer ppx or testing unless becomes symptomatic.  (See prior note)     6. PNC  - PNL reviewed and normal.   - Risk reducing cffDNA, XY  - Rh negative s/p Rhogam  - s/p Tdap, flu, COVID, plans RSV   - RTC weekly for NST and PNV, growth in once week  - Planning for delivery at 39 weeks unless other indication for earlier delivery. Will order IOL today  - GBS positive     BP mild range in the office.  Will send to L&D for delivery.    Brian Acuña MD

## 2023-12-15 NOTE — PROGRESS NOTES
Intrapartum Progress Note    Assessment/Plan   Elidia Lindsay is a 33 y.o.  at 38w1d. GEOVANNY: 2023, by Ultrasound presenting for IOL in s/o GDMA1 and newly diagnosed gHTN     IOL  - induction started with CRB and cyto#1, sp cerclage removal in setting of RPL  - Will AROM after Epidural and start Pitocin If ctx space   - planning for vaginal delivery     gHTN  - dx by mild range pressures greater than 4 hours apart  - asx  - HELLP Labs notable plt 131 (possibly in s/o getstational thrombocytopenia given previous plt 146 during pregnancy), P:C 0.11 at baseline     GDMA1  - q4 hour BG checks  - POCT on admission 76     Subclinical Hypothyroidism  - continue synthroid 12.5mcg     Anxiety  - continue PRN xanax     GBS positive   -intrapartum PCN indicated      Rh negative   - s/p Rhogam @ 28.0wks   - Rhogam postpartum pending  blood type      Fetal status  - CEFM: FHT Cat I   - Last US  2657g 43%, AC 79% --> extrapolates to 3200g      PPBC  - discussed options for postpartum birth control  - Patient desires nothing at this time in setting of IVF pregnancy     To d/w Dr. Del Rosario,  Malina Shankar MD, PGY-2     Principal Problem:    38 weeks gestation of pregnancy    Pregnancy Problems (from 23 to present)       Problem Noted Resolved    38 weeks gestation of pregnancy 12/15/2023 by Vicky Lewis MD No    Priority:  Medium      Family history of brucellosis 2023 by Lexie Gaxiola MD No    Priority:  Medium      Overview Signed 2023  5:18 PM by Lexie Gaxiola MD      with uncertain diagnosis of possible brucellosis, NOT confirmed         Diet controlled gestational diabetes mellitus (GDM) in third trimester 2023 by Brian Acuña MD No    Priority:  Medium      Rh negative state in antepartum period, third trimester 10/4/2023 by Marguerite Srinivasan MD No    Priority:  Medium      Cervical insufficiency during pregnancy, antepartum, third trimester 10/4/2023 by  "Marguerite Srinivasan MD No    Priority:  Medium      Cervical cerclage suture present, antepartum 10/4/2023 by Marguerite Srinivasan MD No    Priority:  Medium      Thyroid disease during pregnancy in third trimester 10/4/2023 by Marguerite Srinivasan MD No    Priority:  Medium      Recurrent pregnancy loss, currently pregnant 9/25/2023 by Mickey Raygoza No    Priority:  Medium      Pregnancy conceived through in vitro fertilization 9/25/2023 by Mickey Raygoza No    Priority:  Medium              Subjective   Pt feeling more ctx, desires Epidural before AROM. Denies HA, change in vision, SOB, CP and RUQ pain     Objective   Last Vitals:  Temp Pulse Resp BP MAP Pulse Ox   36.6 °C (97.9 °F) 75 18 (!) 136/98   98 %     Vitals Min/Max Last 24 Hours:  Temp  Min: 36.6 °C (97.9 °F)  Max: 36.6 °C (97.9 °F)  Pulse  Min: 75  Max: 87  Resp  Min: 18  Max: 18  BP  Min: 134/92  Max: 139/90    Intake/Output:  No intake or output data in the 24 hours ending 12/15/23 5154    Physical Examination:  GENERAL: Examination reveals a well developed, well nourished, gravid female in no acute distress. She is alert and cooperative.  LUNGS:  breathing well on room air, normal respiratory efforts  ABDOMEN:  soft, gravid   FHR is 140 , with Accelerations, and a Category I tracing.    [place] reading:  ctx q1min  SKIN: normal coloration and turgor, no rashes  NEUROLOGICAL: alert, oriented, normal speech, no focal findings or movement disorder noted  PSYCHOLOGICAL: awake and alert; oriented to person, place, and time    Lab Review:  Lab Results   Component Value Date    WBC 7.6 12/15/2023    HGB 13.1 12/15/2023    HCT 37.5 12/15/2023     (L) 12/15/2023     No results found for: \"GLUCOSE\", \"NA\", \"K\", \"CL\", \"CO2\", \"ANIONGAP\", \"BUN\", \"CREATININE\", \"EGFR\", \"CALCIUM\", \"ALBUMIN\", \"PROT\", \"ALKPHOS\", \"ALT\", \"AST\", \"BILITOT\"  No results found for: \"UTPCR\"  "

## 2023-12-15 NOTE — H&P
Obstetrical Admission History and Physical    Assessment and Plan  Patient is a 32yo  at 38.1wga by 11.1wga US admitted for IOL in setting of new gHTN and known GDMA1.    IOL  - admit to L&D for induction of labor with CRB and cyto, sp cerclage removal in setting of RPL  - will monitor for cervical change, will augment labor as required with pitocin and AROM following induction  - delivery plan: patient desires vaginal delivery, patient counseled on risks and benefits of induction of labor and possibility of c/s for failure of induction or non-reassuring fetal status  - type and screen and CBC on admission  - epidural as desired for pain control    gHTN  - dx by mild range pressures greater than 4 hours apart  - asx  - HELLP Labs wnl, P:C 0.11 at baseline    GDMA1  - q4 hour BG checks  - POCT on admission 76    Subclinical Hypothyroidism  - continue synthroid 12.5mcg    Anxiety  - continue PRN xanax    GBS positive   -intrapartum PCN indicated     Rh negative   - s/p Rhogam @ 28.0wks   - Rhogam postpartum pending  blood type     Fetal status  - FHT Cat I   - CEFM  - Last US  2657g 43%, AC 79% --> extrapolates to 3200g     PPBC  - discussed options for postpartum birth control  - Patient desires nothing at this time in setting of IVF pregnancy    Vicky Lewis, PGY3  Seen and discussed with Dr. Mcdaniels-Luis Daniel      Subjective  Patient is a 32yo  at 38.1wga by 11.1wga US admitted for IOL in setting of new gHTN and known GDMA1.    Pregnancy Notables  - Recurrent Pregnancy Loss, APLS neg  - hx of previable PPROM with D&E at 21wga, sp cerclage removed   - subclinical hypothyroidism, on synthroid 12.5mcg  - IVF pregnancy, fetal echo wnl  - GDMA1  - Brucellosis exposure, asymptomatic       Obstetrical History   OB History    Para Term  AB Living   5       4     SAB IAB Ectopic Multiple Live Births   3 1            # Outcome Date GA Lbr Jeffrey/2nd Weight Sex Delivery Anes PTL Lv   5  Current            4 SAB 10/17/22 19w1d             Complications: Premature rupture of membranes   3 SAB 12/2019           2 SAB 05/2019     Biochemical      1 IAB 04/2011               Past Medical History  Past Medical History:   Diagnosis Date    Acne, unspecified     Mild acne    Anxiety and depression     Delacruz cerclage present     Personal history of other diseases of the nervous system and sense organs 01/05/2022    History of hearing loss    Postural orthostatic tachycardia syndrome (POTS) 04/12/2017    POTS (postural orthostatic tachycardia syndrome)    Recurrent pregnancy loss     Subclinical hypothyroidism     Vitamin D deficiency         Past Surgical History   Past Surgical History:   Procedure Laterality Date    MOUTH SURGERY  03/25/2016    Oral Surgery Tooth Extraction    OTHER SURGICAL HISTORY  11/29/2022    Dilation and evacuation    OTHER SURGICAL HISTORY  01/05/2022    Rhinoplasty       Social History  Social History     Tobacco Use    Smoking status: Never    Smokeless tobacco: Never   Substance Use Topics    Alcohol use: Not Currently     Substance and Sexual Activity   Drug Use Never       Allergies  Patient has no known allergies.     Medications  Medications Prior to Admission   Medication Sig Dispense Refill Last Dose    alcohol swabs pads, medicated Apply 1 each topically 4 times a day. 120 each 3     aspirin 81 mg EC tablet Take 1 tablet (81 mg) by mouth once daily.   12/14/2023    FreeStyle glucose monitoring (OneTouch Verio Flex Start) kit 1 each 4 times a day. Test before breakfast (fasting) and 1 hour after each meal 1 each 0     lancets (OneTouch UltraSoft Lancets) misc 1 each 4 times a day. Test before breakfast (fasting) and 1 hour after each meal 120 each 3     levothyroxine (Synthroid, Levoxyl) 25 mcg tablet Take 1 tablet (25 mcg) by mouth once daily.   12/15/2023    prenatal 26-iron ps-folic-dha (Vitafol-One) 29 mg iron- 1 mg-200 mg capsule Take by mouth.   12/15/2023        Objective    Last Vitals  Temp Pulse Resp BP MAP O2 Sat   36.6 °C (97.9 °F) 75 18 (!) 136/98   98 %     Physical Examination  GENERAL: Examination reveals a well developed, well nourished, gravid female in no acute distress. She is alert and cooperative.  FHR is 130/mod/+accel/-decel  TOCO: quiet  The fetus is in a vertex presentation, determined by ultrasound  CERVIX: 1 cm dilated, 50 % effaced, -3 station; MEMBRANES are    PSYCHOLOGICAL: awake and alert; oriented to person, place, and time

## 2023-12-15 NOTE — ANESTHESIA PROCEDURE NOTES
Epidural Block    Patient location during procedure: OB  Start time: 12/15/2023 6:36 PM  End time: 12/15/2023 6:45 PM  Reason for block: labor analgesia  Staffing  Performed: attending   Authorized by: Bridgett Mahmood MD    Performed by: Bridgett Mahmood MD    Preanesthetic Checklist  Completed: patient identified, IV checked, risks and benefits discussed, surgical consent, monitors and equipment checked, pre-op evaluation, timeout performed and sterile techniques followed  Block Timeout  RN/Licensed healthcare professional reads aloud to the Anesthesia provider and entire team: Patient identity, procedure with side and site, patient position, and as applicable the availability of implants/special equipment/special requirements.  Patient on coagulant treatment: yes (Baby aspirin)  Timeout performed at: 12/15/2023 6:36 PM  Block Placement  Patient position: sitting  Prep: ChloraPrep  Sterility prep: drape, cap, gloves and mask  Sedation level: no sedation  Patient monitoring: continuous pulse oximetry and blood pressure  Approach: midline  Local numbing: lidocaine 1% to skin and subcutaneous tissues  Vertebral space: lumbar  Lumbar location: L4-L5  Epidural  Loss of resistance technique: saline  Guidance: landmark technique        Needle  Needle type: Tuohy   Needle gauge: 17  Needle length: 8.9cm  Needle insertion depth: 6 cm  Catheter type: end hole  Catheter size: 19 G  Catheter at skin depth: 11 cm  Catheter securement method: liquid medical adhesive and clear occlusive dressing    Test dose: lidocaine 1.5% with epinephrine 1-to-200,000  Test dose given at 12/15/2023 6:45 PM  Test dose: lidocaine 1.5% with epinephrine 1-to-200,000  Test dose result: no positive test dose    PCEA  Medication concentration used: 0.044% Bupivacaine with 1.25 mcg/mL Fentanyl and [id number] Epinephrine  Dose (mL): 10  Lockout (minutes): 15  1-Hour Limit (boluses/hr): 4  Basal Rate: 14        Assessment  Sensory level: T10  Block  outcome: pain improved  Number of attempts: 1  Events: no positive test dose  Procedure assessment: patient tolerated procedure well with no immediate complications

## 2023-12-15 NOTE — PROCEDURES
Elidiabelinda Lindsay, a  at 38w1d with an GEOVANNY of 2023, by Ultrasound, was seen at  [facility] for a nonstress test.    Non-Stress Test   Baseline Fetal Heart Rate for Non-Stress Test: 135 BPM  Variability in Waveform for Non-Stress Test: Moderate  Accelerations in Non-Stress Test: Yes  Decelerations in Non-Stress Test: None  Contractions in Non-Stress Test: Not present  Interpretation of Non-Stress Test   Interpretation of Non-Stress Test: Reactive

## 2023-12-16 LAB
GLUCOSE BLD MANUAL STRIP-MCNC: 111 MG/DL (ref 74–99)
GLUCOSE BLD MANUAL STRIP-MCNC: 125 MG/DL (ref 74–99)
GLUCOSE BLD MANUAL STRIP-MCNC: 79 MG/DL (ref 74–99)
GLUCOSE BLD MANUAL STRIP-MCNC: 86 MG/DL (ref 74–99)
GLUCOSE BLD MANUAL STRIP-MCNC: 95 MG/DL (ref 74–99)

## 2023-12-16 PROCEDURE — 2500000001 HC RX 250 WO HCPCS SELF ADMINISTERED DRUGS (ALT 637 FOR MEDICARE OP): Performed by: STUDENT IN AN ORGANIZED HEALTH CARE EDUCATION/TRAINING PROGRAM

## 2023-12-16 PROCEDURE — 3E0H7GC INTRODUCTION OF OTHER THERAPEUTIC SUBSTANCE INTO LOWER GI, VIA NATURAL OR ARTIFICIAL OPENING: ICD-10-PCS | Performed by: OBSTETRICS & GYNECOLOGY

## 2023-12-16 PROCEDURE — 51701 INSERT BLADDER CATHETER: CPT

## 2023-12-16 PROCEDURE — 2500000001 HC RX 250 WO HCPCS SELF ADMINISTERED DRUGS (ALT 637 FOR MEDICARE OP)

## 2023-12-16 PROCEDURE — 10D17Z9 MANUAL EXTRACTION OF PRODUCTS OF CONCEPTION, RETAINED, VIA NATURAL OR ARTIFICIAL OPENING: ICD-10-PCS | Performed by: OBSTETRICS & GYNECOLOGY

## 2023-12-16 PROCEDURE — 10H07YZ INSERTION OF OTHER DEVICE INTO PRODUCTS OF CONCEPTION, VIA NATURAL OR ARTIFICIAL OPENING: ICD-10-PCS

## 2023-12-16 PROCEDURE — 0UQG7ZZ REPAIR VAGINA, VIA NATURAL OR ARTIFICIAL OPENING: ICD-10-PCS | Performed by: OBSTETRICS & GYNECOLOGY

## 2023-12-16 PROCEDURE — 82947 ASSAY GLUCOSE BLOOD QUANT: CPT

## 2023-12-16 PROCEDURE — 2500000004 HC RX 250 GENERAL PHARMACY W/ HCPCS (ALT 636 FOR OP/ED): Performed by: STUDENT IN AN ORGANIZED HEALTH CARE EDUCATION/TRAINING PROGRAM

## 2023-12-16 PROCEDURE — 0W3R7ZZ CONTROL BLEEDING IN GENITOURINARY TRACT, VIA NATURAL OR ARTIFICIAL OPENING: ICD-10-PCS | Performed by: OBSTETRICS & GYNECOLOGY

## 2023-12-16 PROCEDURE — 94760 N-INVAS EAR/PLS OXIMETRY 1: CPT

## 2023-12-16 PROCEDURE — 96372 THER/PROPH/DIAG INJ SC/IM: CPT | Performed by: STUDENT IN AN ORGANIZED HEALTH CARE EDUCATION/TRAINING PROGRAM

## 2023-12-16 PROCEDURE — 3E033VJ INTRODUCTION OF OTHER HORMONE INTO PERIPHERAL VEIN, PERCUTANEOUS APPROACH: ICD-10-PCS

## 2023-12-16 PROCEDURE — 1100000001 HC PRIVATE ROOM DAILY

## 2023-12-16 PROCEDURE — 2500000005 HC RX 250 GENERAL PHARMACY W/O HCPCS

## 2023-12-16 PROCEDURE — 59409 OBSTETRICAL CARE: CPT | Performed by: OBSTETRICS & GYNECOLOGY

## 2023-12-16 PROCEDURE — 2500000005 HC RX 250 GENERAL PHARMACY W/O HCPCS: Performed by: STUDENT IN AN ORGANIZED HEALTH CARE EDUCATION/TRAINING PROGRAM

## 2023-12-16 RX ORDER — ACETAMINOPHEN 325 MG/1
975 TABLET ORAL EVERY 6 HOURS
Status: DISCONTINUED | OUTPATIENT
Start: 2023-12-17 | End: 2023-12-19 | Stop reason: HOSPADM

## 2023-12-16 RX ORDER — IBUPROFEN 600 MG/1
600 TABLET ORAL EVERY 6 HOURS
Status: DISCONTINUED | OUTPATIENT
Start: 2023-12-17 | End: 2023-12-19 | Stop reason: HOSPADM

## 2023-12-16 RX ORDER — LEVOTHYROXINE SODIUM 25 UG/1
12.5 TABLET ORAL DAILY
Status: DISCONTINUED | OUTPATIENT
Start: 2023-12-16 | End: 2023-12-19 | Stop reason: HOSPADM

## 2023-12-16 RX ORDER — LIDOCAINE HYDROCHLORIDE 10 MG/ML
INJECTION INFILTRATION; PERINEURAL AS NEEDED
Status: DISCONTINUED | OUTPATIENT
Start: 2023-12-16 | End: 2023-12-16

## 2023-12-16 RX ADMIN — PENICILLIN G 3 MILLION UNITS: 3000000 INJECTION, SOLUTION INTRAVENOUS at 09:36

## 2023-12-16 RX ADMIN — LIDOCAINE HYDROCHLORIDE 5 ML: 10 INJECTION, SOLUTION INFILTRATION; PERINEURAL at 15:01

## 2023-12-16 RX ADMIN — PENICILLIN G 3 MILLION UNITS: 3000000 INJECTION, SOLUTION INTRAVENOUS at 17:30

## 2023-12-16 RX ADMIN — Medication 2 MILLI-UNITS/MIN: at 01:25

## 2023-12-16 RX ADMIN — LIDOCAINE HYDROCHLORIDE 3 ML: 10 INJECTION, SOLUTION INFILTRATION; PERINEURAL at 22:18

## 2023-12-16 RX ADMIN — SODIUM CHLORIDE, POTASSIUM CHLORIDE, SODIUM LACTATE AND CALCIUM CHLORIDE 125 ML/HR: 600; 310; 30; 20 INJECTION, SOLUTION INTRAVENOUS at 21:00

## 2023-12-16 RX ADMIN — Medication 14 ML/HR: at 16:20

## 2023-12-16 RX ADMIN — CARBOPROST TROMETHAMINE 250 MCG: 250 INJECTION, SOLUTION INTRAMUSCULAR at 22:23

## 2023-12-16 RX ADMIN — PENICILLIN G 3 MILLION UNITS: 3000000 INJECTION, SOLUTION INTRAVENOUS at 05:59

## 2023-12-16 RX ADMIN — PENICILLIN G 3 MILLION UNITS: 3000000 INJECTION, SOLUTION INTRAVENOUS at 13:29

## 2023-12-16 RX ADMIN — LEVOTHYROXINE SODIUM 12.5 MCG: 25 TABLET ORAL at 09:07

## 2023-12-16 RX ADMIN — SODIUM CHLORIDE, POTASSIUM CHLORIDE, SODIUM LACTATE AND CALCIUM CHLORIDE 125 ML/HR: 600; 310; 30; 20 INJECTION, SOLUTION INTRAVENOUS at 03:04

## 2023-12-16 RX ADMIN — Medication 14 ML/HR: at 01:47

## 2023-12-16 RX ADMIN — PENICILLIN G 3 MILLION UNITS: 3000000 INJECTION, SOLUTION INTRAVENOUS at 01:24

## 2023-12-16 RX ADMIN — LIDOCAINE HYDROCHLORIDE 5 ML: 10 INJECTION, SOLUTION INFILTRATION; PERINEURAL at 14:32

## 2023-12-16 RX ADMIN — ONDANSETRON 4 MG: 2 INJECTION INTRAMUSCULAR; INTRAVENOUS at 21:22

## 2023-12-16 RX ADMIN — MISOPROSTOL 800 MCG: 200 TABLET ORAL at 22:16

## 2023-12-16 RX ADMIN — LOPERAMIDE HYDROCHLORIDE 4 MG: 2 CAPSULE ORAL at 22:34

## 2023-12-16 RX ADMIN — ONDANSETRON 4 MG: 2 INJECTION INTRAMUSCULAR; INTRAVENOUS at 14:33

## 2023-12-16 ASSESSMENT — PAIN SCALES - GENERAL
PAINLEVEL_OUTOF10: 0 - NO PAIN
PAINLEVEL_OUTOF10: 5 - MODERATE PAIN
PAINLEVEL_OUTOF10: 0 - NO PAIN

## 2023-12-16 NOTE — SIGNIFICANT EVENT
Cervical Exam  Dilation: 5  Effacement (%): 70  Fetal Station: -2  Method: Manual  OB Examiner: Perry BOSWELL  Fetal Assessment  Movement: Present  Mode: External US  Baseline Fetal Heart Rate (bpm): 145 bpm  Baseline Classification: Normal  Variability: Moderate (Between 6 and 25 BPM)  Pattern: Accelerations  Pattern Observations:  (Baseline chnage to 140 bpm from 4169-6569)  FHR Category: Category I  Multiple Births: No     gHTN: Bps well controlled currently, neg PEC labs  GDMA1: BG   GBS pos, PCN  EFW 3.2 kg    Roseanne Amador MD

## 2023-12-16 NOTE — SIGNIFICANT EVENT
"Labor Check      S: Feeling more pressure. Desires cervical exam          O:  /85   Pulse 107   Temp 37.1 °C (98.8 °F) (Temporal)   Resp 16   Ht 1.549 m (5' 1\")   Wt 63.8 kg (140 lb 10.5 oz)   LMP 01/19/2023   SpO2 98%   BMI 26.58 kg/m²      SVE: 5/70/-1     NST:   Baseline: 145  Variability: moderate  Accels: +  Decels: -    [place]: q1-2 min         A/P  -  Latent Labor.   - CEFM Cat I currently  - Pitocin currently infusing. Increase per protocol  - Continue position changes, as tolerated  - gHTN- HELLP labs neg, P:C 0.11  - gThrombocytopenia- plts 131   - GDMA1- continue BG checks   - GBS positive, receiving PCN  - Recheck as clinically indicated by maternal or fetal status or PRN   - Anticipate NSVB.         Pt. d/w Dr. Perry Waters MD  PGY-1, Obstetrics & Gynecology   [facility]       "

## 2023-12-16 NOTE — SIGNIFICANT EVENT
"Labor Check      S: Patient now feeling rectal pressure.         O:  BP (!) 131/90   Pulse 99   Temp 37.1 °C (98.8 °F) (Temporal)   Resp 16   Ht 1.549 m (5' 1\")   Wt 63.8 kg (140 lb 10.5 oz)   LMP 01/19/2023   SpO2 98%   BMI 26.58 kg/m²      SVE: 5/70/-2     NST:   Baseline: 140  Variability: moderate  Accels: +  Decels: -    [place]: q3min      A/P  -  Latent Labor.   - CEFM Cat I currently  - Pitocin currently infusing. Increase per protocol  - Continue position changes, as tolerated  - gHTN- HELLP labs neg, P:C 0.11  - gThrombocytopenia- plts 131   - GDMA1- continue BG checks   - GBS positive, receiving PCN  - Recheck as clinically indicated by maternal or fetal status or PRN   - Anticipate NSVB.      Pt. d/w Dr. Perry Waters MD  PGY-1, Obstetrics & Gynecology   [facility]       "

## 2023-12-16 NOTE — INDIVIDUALIZED OVERALL PLAN OF CARE NOTE
Pt is resting in bed, feeling more pressure     FHTL 130, moderate variability, +accel, - decel   [place]: q1-2min  SVE: 5.5/70/-2, asynclitic fetal head     A/P  Side lying release techniques implement  Pitocin 8, titrate per protocol  Cat I tracing    Malina Shankar MD

## 2023-12-16 NOTE — INDIVIDUALIZED OVERALL PLAN OF CARE NOTE
Pt is resting in bed, feeling more pressure     FHT: 140, moderate variability, +accel, -decel  [place]: q2-3 min  SVE: 5.5/70/-2 , IUPC placed     A/P  - given unchanged cervix, IUPC placed to better assess ctx strength and Pitocin needs   Pitocin 4 titrate per protocol  Epidural/nubain per patient's request  Cat I tracing    D/w Dr. Cervantes,  Malina Shankar MD, PGY-2

## 2023-12-16 NOTE — INDIVIDUALIZED OVERALL PLAN OF CARE NOTE
Pt is feeling more comfortable since Epidural. AROM for clear performed at 2050    FHT: 145, minimal variability, -accel, - decel (recently received epidural )  [place]: q1min  SVE: 5/60/-3     A/P  Will start Pitocin if ctx space  Cat II tracing currently     D/w Dr. Carin Shankar MD

## 2023-12-16 NOTE — PROGRESS NOTES
Intrapartum Progress Note    Assessment/Plan   Elidia Lindsay is a 33 y.o.  at 38w1d. GEOVANNY: 2023, by Ultrasound presenting for IOL in s/o GDMA1 and newly diagnosed gHTN      1. IOL  - Most recent SVE: /-2  - s/p AROM at 2050  - Pit started at 0300  - Delivery plan: patient desires vaginal delivery, patient counseled on risks of labor, vaginal delivery, and possibility of C/S for varying maternal or fetal indications      2. gHTN  - dx by mild range pressures greater than 4 hours apart  - HELLP Labs notable plt 131 (possibly in s/o getstational thrombocytopenia given previous plt 146 during pregnancy), P:C 0.11 at baseline  - asx    3. GDMA1  - q4 hour BG checks  - Most recently 79    4. Subclinical Hypothyroidism   - Continue Synthroid 12.5mcg     5. Anxiety  - continue PRN xanax    6. Maternal well-being  - O neg, Adm Hgb 13.1  - s/p Rhogam @ 28.0wks  - Rhogam postpartum pending  blood type   - GBS pos- receiving PCN  - ppBC: declines  - Patient adamantly declines seeing lactation specialist pospartum    7. Fetal well-being  - CEFM; Cat 1 currently  - Last US  2657g 43%, AC 79% --> extrapolates to 3200g  (7#)     Seen & d/w Dr. Carlos Waters MD  PGY-1, Obstetrics & Gynecology   [facility]       Principal Problem:    38 weeks gestation of pregnancy  Active Problems:    History of general anesthesia    Hypothyroidism    Pregnancy Problems (from 23 to present)       Problem Noted Resolved    38 weeks gestation of pregnancy 12/15/2023 by Vicky Lewis MD No    Priority:  Medium      Family history of brucellosis 2023 by Lexie Gaxiola MD No    Priority:  Medium      Overview Signed 2023  5:18 PM by Lexie Gaxiola MD      with uncertain diagnosis of possible brucellosis, NOT confirmed         Diet controlled gestational diabetes mellitus (GDM) in third trimester 2023 by Brian Acuña MD No    Priority:  Medium       Rh negative state in antepartum period, third trimester 10/4/2023 by Marguerite Srinivasan MD No    Priority:  Medium      Cervical insufficiency during pregnancy, antepartum, third trimester 10/4/2023 by Marguerite Srinivasan MD No    Priority:  Medium      Cervical cerclage suture present, antepartum 10/4/2023 by Marguerite Srinivasan MD No    Priority:  Medium      Thyroid disease during pregnancy in third trimester 10/4/2023 by Marguerite Srinivasan MD No    Priority:  Medium      Recurrent pregnancy loss, currently pregnant 9/25/2023 by Mickey Raygoza No    Priority:  Medium      Pregnancy conceived through in vitro fertilization 9/25/2023 by Mickey Raygoza No    Priority:  Medium              Subjective   Patient resting with support team at bedside. Discussed labor progress and desire to NOT see lactation specialists in the postpartum period.     Objective   Last Vitals:  Temp Pulse Resp BP MAP Pulse Ox   36.5 °C (97.7 °F) 88 16 (!) 138/95   98 %     Vitals Min/Max Last 24 Hours:  Temp  Min: 36.5 °C (97.7 °F)  Max: 37.6 °C (99.7 °F)  Pulse  Min: 75  Max: 96  Resp  Min: 16  Max: 18  BP  Min: 117/74  Max: 140/89    Intake/Output:    Intake/Output Summary (Last 24 hours) at 12/16/2023 0726  Last data filed at 12/16/2023 0519  Gross per 24 hour   Intake 97.3 ml   Output 1800 ml   Net -1702.7 ml       Physical Examination:  Genitourinary:      Deferred  Cardiovascular:      Rate and Rhythm: Normal rate.   Pulmonary:      Effort: Pulmonary effort is normal.      Breath sounds: Normal breath sounds.   Abdominal:      Palpations: Abdomen is soft.      Comments: Gravid, non-tender to palpation  Neurological:      General: No focal deficit present.      Mental Status: She is alert.   Skin:     General: Skin is warm and dry.   Psychiatric:         Mood and Affect: Mood normal.         Behavior: Behavior normal.         Lab Review:  Lab Results   Component Value Date    WBC 7.6 12/15/2023    HGB 13.1 12/15/2023    HCT 37.5  12/15/2023     (L) 12/15/2023

## 2023-12-16 NOTE — SIGNIFICANT EVENT
"Labor Check      S: Patient feeling increased pressure. Desires cervical exam         O:  /87   Pulse 92   Temp 37.1 °C (98.8 °F) (Temporal)   Resp 18   Ht 1.549 m (5' 1\")   Wt 63.8 kg (140 lb 10.5 oz)   LMP 01/19/2023   SpO2 98%   BMI 26.58 kg/m²      SVE: 6/80/0     NST  Baseline: 148  Variability: moderate  Accels: +  Decels: -    [place]: qmin         A/P    - Active Labor.   - CEFM Cat I currently  - Pitocin currently infusing. Increase per protocol  - Continue position changes, as tolerated  - gHTN- HELLP labs neg, P:C 0.11  - gThrombocytopenia- plts 131   - GDMA1- continue BG checks   - GBS positive, receiving PCN  - Recheck as clinically indicated by maternal or fetal status or PRN   - Anticipate NSVB.       Pt. d/w Dr. Perry Waters MD  PGY-1, Obstetrics & Gynecology   [facility]       "

## 2023-12-16 NOTE — INDIVIDUALIZED OVERALL PLAN OF CARE NOTE
Pt feeling subtle increased vaginal pressure.     FHT: 130, moderate variabilty, +accel, - decel   [place]:q1 min with occasional spacing   SVE: 5/60/-3    A/P  Given unchanged cervix, Will start Pitocin and keep at low dose given possible developing tachysystole  Cat I tracing  currently     D/w Dr. Cervantes   .Malina Shankar MD

## 2023-12-17 LAB
ALBUMIN SERPL BCP-MCNC: 2.4 G/DL (ref 3.4–5)
ALBUMIN SERPL BCP-MCNC: 2.5 G/DL (ref 3.4–5)
ALBUMIN SERPL BCP-MCNC: 2.6 G/DL (ref 3.4–5)
ALP SERPL-CCNC: 166 U/L (ref 33–110)
ALP SERPL-CCNC: 169 U/L (ref 33–110)
ALP SERPL-CCNC: 184 U/L (ref 33–110)
ALT SERPL W P-5'-P-CCNC: 10 U/L (ref 7–45)
ALT SERPL W P-5'-P-CCNC: 12 U/L (ref 7–45)
ALT SERPL W P-5'-P-CCNC: 15 U/L (ref 7–45)
ANION GAP SERPL CALC-SCNC: 13 MMOL/L (ref 10–20)
ANION GAP SERPL CALC-SCNC: 15 MMOL/L (ref 10–20)
ANION GAP SERPL CALC-SCNC: 16 MMOL/L (ref 10–20)
AST SERPL W P-5'-P-CCNC: 30 U/L (ref 9–39)
AST SERPL W P-5'-P-CCNC: 37 U/L (ref 9–39)
AST SERPL W P-5'-P-CCNC: 39 U/L (ref 9–39)
BB ANTIBODY IDENTIFICATION: NORMAL
BILIRUB SERPL-MCNC: 0.3 MG/DL (ref 0–1.2)
BILIRUB SERPL-MCNC: 0.4 MG/DL (ref 0–1.2)
BILIRUB SERPL-MCNC: 0.4 MG/DL (ref 0–1.2)
BUN SERPL-MCNC: 12 MG/DL (ref 6–23)
BUN SERPL-MCNC: 13 MG/DL (ref 6–23)
BUN SERPL-MCNC: 15 MG/DL (ref 6–23)
CALCIUM SERPL-MCNC: 7.5 MG/DL (ref 8.6–10.6)
CALCIUM SERPL-MCNC: 7.7 MG/DL (ref 8.6–10.6)
CALCIUM SERPL-MCNC: 8.1 MG/DL (ref 8.6–10.6)
CASE #: NORMAL
CHLORIDE SERPL-SCNC: 104 MMOL/L (ref 98–107)
CHLORIDE SERPL-SCNC: 105 MMOL/L (ref 98–107)
CHLORIDE SERPL-SCNC: 108 MMOL/L (ref 98–107)
CO2 SERPL-SCNC: 17 MMOL/L (ref 21–32)
CO2 SERPL-SCNC: 18 MMOL/L (ref 21–32)
CO2 SERPL-SCNC: 22 MMOL/L (ref 21–32)
CREAT SERPL-MCNC: 1.37 MG/DL (ref 0.5–1.05)
CREAT SERPL-MCNC: 1.54 MG/DL (ref 0.5–1.05)
CREAT SERPL-MCNC: 1.58 MG/DL (ref 0.5–1.05)
ERYTHROCYTE [DISTWIDTH] IN BLOOD BY AUTOMATED COUNT: 13.5 % (ref 11.5–14.5)
ERYTHROCYTE [DISTWIDTH] IN BLOOD BY AUTOMATED COUNT: 13.7 % (ref 11.5–14.5)
ERYTHROCYTE [DISTWIDTH] IN BLOOD BY AUTOMATED COUNT: 14 % (ref 11.5–14.5)
GFR SERPL CREATININE-BSD FRML MDRD: 44 ML/MIN/1.73M*2
GFR SERPL CREATININE-BSD FRML MDRD: 46 ML/MIN/1.73M*2
GFR SERPL CREATININE-BSD FRML MDRD: 53 ML/MIN/1.73M*2
GLUCOSE SERPL-MCNC: 104 MG/DL (ref 74–99)
GLUCOSE SERPL-MCNC: 158 MG/DL (ref 74–99)
GLUCOSE SERPL-MCNC: 93 MG/DL (ref 74–99)
HCT VFR BLD AUTO: 31.6 % (ref 36–46)
HCT VFR BLD AUTO: 31.7 % (ref 36–46)
HCT VFR BLD AUTO: 40.4 % (ref 36–46)
HGB BLD-MCNC: 11 G/DL (ref 12–16)
HGB BLD-MCNC: 11 G/DL (ref 12–16)
HGB BLD-MCNC: 12.4 G/DL (ref 12–16)
MCH RBC QN AUTO: 32.4 PG (ref 26–34)
MCH RBC QN AUTO: 32.8 PG (ref 26–34)
MCH RBC QN AUTO: 33.3 PG (ref 26–34)
MCHC RBC AUTO-ENTMCNC: 30.7 G/DL (ref 32–36)
MCHC RBC AUTO-ENTMCNC: 34.7 G/DL (ref 32–36)
MCHC RBC AUTO-ENTMCNC: 34.8 G/DL (ref 32–36)
MCV RBC AUTO: 109 FL (ref 80–100)
MCV RBC AUTO: 93 FL (ref 80–100)
MCV RBC AUTO: 95 FL (ref 80–100)
NRBC BLD-RTO: 0 /100 WBCS (ref 0–0)
PLATELET # BLD AUTO: 94 X10*3/UL (ref 150–450)
PLATELET # BLD AUTO: 96 X10*3/UL (ref 150–450)
PLATELET # BLD AUTO: 98 X10*3/UL (ref 150–450)
POTASSIUM SERPL-SCNC: 4 MMOL/L (ref 3.5–5.3)
POTASSIUM SERPL-SCNC: 4.1 MMOL/L (ref 3.5–5.3)
POTASSIUM SERPL-SCNC: 4.3 MMOL/L (ref 3.5–5.3)
PROT SERPL-MCNC: 4.6 G/DL (ref 6.4–8.2)
PROT SERPL-MCNC: 4.6 G/DL (ref 6.4–8.2)
PROT SERPL-MCNC: 4.8 G/DL (ref 6.4–8.2)
RBC # BLD AUTO: 3.35 X10*6/UL (ref 4–5.2)
RBC # BLD AUTO: 3.39 X10*6/UL (ref 4–5.2)
RBC # BLD AUTO: 3.72 X10*6/UL (ref 4–5.2)
SODIUM SERPL-SCNC: 133 MMOL/L (ref 136–145)
SODIUM SERPL-SCNC: 134 MMOL/L (ref 136–145)
SODIUM SERPL-SCNC: 139 MMOL/L (ref 136–145)
WBC # BLD AUTO: 13.4 X10*3/UL (ref 4.4–11.3)
WBC # BLD AUTO: 16.2 X10*3/UL (ref 4.4–11.3)
WBC # BLD AUTO: 18.9 X10*3/UL (ref 4.4–11.3)

## 2023-12-17 PROCEDURE — 80053 COMPREHEN METABOLIC PANEL: CPT

## 2023-12-17 PROCEDURE — 51702 INSERT TEMP BLADDER CATH: CPT

## 2023-12-17 PROCEDURE — 88307 TISSUE EXAM BY PATHOLOGIST: CPT | Performed by: PATHOLOGY

## 2023-12-17 PROCEDURE — 85027 COMPLETE CBC AUTOMATED: CPT

## 2023-12-17 PROCEDURE — 7100000016 HC LABOR RECOVERY PER HOUR

## 2023-12-17 PROCEDURE — 1100000001 HC PRIVATE ROOM DAILY

## 2023-12-17 PROCEDURE — 36415 COLL VENOUS BLD VENIPUNCTURE: CPT | Performed by: STUDENT IN AN ORGANIZED HEALTH CARE EDUCATION/TRAINING PROGRAM

## 2023-12-17 PROCEDURE — 99199 UNLISTED SPECIAL SVC PX/RPRT: CPT | Performed by: OBSTETRICS & GYNECOLOGY

## 2023-12-17 PROCEDURE — 88307 TISSUE EXAM BY PATHOLOGIST: CPT | Mod: TC,SUR

## 2023-12-17 PROCEDURE — 2500000001 HC RX 250 WO HCPCS SELF ADMINISTERED DRUGS (ALT 637 FOR MEDICARE OP): Performed by: STUDENT IN AN ORGANIZED HEALTH CARE EDUCATION/TRAINING PROGRAM

## 2023-12-17 PROCEDURE — 80053 COMPREHEN METABOLIC PANEL: CPT | Performed by: STUDENT IN AN ORGANIZED HEALTH CARE EDUCATION/TRAINING PROGRAM

## 2023-12-17 PROCEDURE — 7220000003 HC JADA OB SPECIAL CARE, 4 HOURS

## 2023-12-17 PROCEDURE — 84075 ASSAY ALKALINE PHOSPHATASE: CPT

## 2023-12-17 PROCEDURE — 2500000005 HC RX 250 GENERAL PHARMACY W/O HCPCS: Performed by: STUDENT IN AN ORGANIZED HEALTH CARE EDUCATION/TRAINING PROGRAM

## 2023-12-17 PROCEDURE — 7210000002 HC LABOR PER HOUR

## 2023-12-17 PROCEDURE — 51701 INSERT BLADDER CATHETER: CPT

## 2023-12-17 PROCEDURE — 2500000004 HC RX 250 GENERAL PHARMACY W/ HCPCS (ALT 636 FOR OP/ED)

## 2023-12-17 PROCEDURE — 85027 COMPLETE CBC AUTOMATED: CPT | Performed by: STUDENT IN AN ORGANIZED HEALTH CARE EDUCATION/TRAINING PROGRAM

## 2023-12-17 PROCEDURE — 59050 FETAL MONITOR W/REPORT: CPT

## 2023-12-17 PROCEDURE — 59409 OBSTETRICAL CARE: CPT

## 2023-12-17 PROCEDURE — 59899 UNLISTED PX MAT CARE&DLVR: CPT

## 2023-12-17 PROCEDURE — 36415 COLL VENOUS BLD VENIPUNCTURE: CPT

## 2023-12-17 PROCEDURE — 99231 SBSQ HOSP IP/OBS SF/LOW 25: CPT | Performed by: NURSE PRACTITIONER

## 2023-12-17 RX ORDER — CALCIUM GLUCONATE 98 MG/ML
1 INJECTION, SOLUTION INTRAVENOUS ONCE AS NEEDED
Status: DISCONTINUED | OUTPATIENT
Start: 2023-12-17 | End: 2023-12-19 | Stop reason: HOSPADM

## 2023-12-17 RX ORDER — LABETALOL HYDROCHLORIDE 5 MG/ML
20 INJECTION, SOLUTION INTRAVENOUS ONCE AS NEEDED
Status: DISCONTINUED | OUTPATIENT
Start: 2023-12-17 | End: 2023-12-19 | Stop reason: HOSPADM

## 2023-12-17 RX ORDER — OXYTOCIN 10 [USP'U]/ML
10 INJECTION, SOLUTION INTRAMUSCULAR; INTRAVENOUS ONCE AS NEEDED
Status: DISCONTINUED | OUTPATIENT
Start: 2023-12-17 | End: 2023-12-19 | Stop reason: HOSPADM

## 2023-12-17 RX ORDER — LOPERAMIDE HYDROCHLORIDE 2 MG/1
4 CAPSULE ORAL EVERY 2 HOUR PRN
Status: DISCONTINUED | OUTPATIENT
Start: 2023-12-17 | End: 2023-12-19 | Stop reason: HOSPADM

## 2023-12-17 RX ORDER — OXYTOCIN/0.9 % SODIUM CHLORIDE 30/500 ML
60 PLASTIC BAG, INJECTION (ML) INTRAVENOUS ONCE AS NEEDED
Status: DISCONTINUED | OUTPATIENT
Start: 2023-12-17 | End: 2023-12-19 | Stop reason: HOSPADM

## 2023-12-17 RX ORDER — MISOPROSTOL 200 UG/1
800 TABLET ORAL ONCE AS NEEDED
Status: DISCONTINUED | OUTPATIENT
Start: 2023-12-17 | End: 2023-12-19 | Stop reason: HOSPADM

## 2023-12-17 RX ORDER — SODIUM CHLORIDE, SODIUM LACTATE, POTASSIUM CHLORIDE, CALCIUM CHLORIDE 600; 310; 30; 20 MG/100ML; MG/100ML; MG/100ML; MG/100ML
75 INJECTION, SOLUTION INTRAVENOUS CONTINUOUS
Status: DISCONTINUED | OUTPATIENT
Start: 2023-12-17 | End: 2023-12-19 | Stop reason: HOSPADM

## 2023-12-17 RX ORDER — NIFEDIPINE 10 MG/1
10 CAPSULE ORAL ONCE AS NEEDED
Status: DISCONTINUED | OUTPATIENT
Start: 2023-12-17 | End: 2023-12-19 | Stop reason: HOSPADM

## 2023-12-17 RX ORDER — ONDANSETRON 4 MG/1
4 TABLET, FILM COATED ORAL EVERY 6 HOURS PRN
Status: DISCONTINUED | OUTPATIENT
Start: 2023-12-17 | End: 2023-12-19 | Stop reason: HOSPADM

## 2023-12-17 RX ORDER — DIPHENHYDRAMINE HCL 25 MG
25 CAPSULE ORAL EVERY 6 HOURS PRN
Status: DISCONTINUED | OUTPATIENT
Start: 2023-12-17 | End: 2023-12-19 | Stop reason: HOSPADM

## 2023-12-17 RX ORDER — ENOXAPARIN SODIUM 100 MG/ML
40 INJECTION SUBCUTANEOUS EVERY 24 HOURS
Status: DISCONTINUED | OUTPATIENT
Start: 2023-12-17 | End: 2023-12-19 | Stop reason: HOSPADM

## 2023-12-17 RX ORDER — SIMETHICONE 80 MG
80 TABLET,CHEWABLE ORAL 4 TIMES DAILY PRN
Status: DISCONTINUED | OUTPATIENT
Start: 2023-12-17 | End: 2023-12-19 | Stop reason: HOSPADM

## 2023-12-17 RX ORDER — ADHESIVE BANDAGE
10 BANDAGE TOPICAL
Status: DISCONTINUED | OUTPATIENT
Start: 2023-12-17 | End: 2023-12-19 | Stop reason: HOSPADM

## 2023-12-17 RX ORDER — SODIUM CHLORIDE, SODIUM LACTATE, POTASSIUM CHLORIDE, CALCIUM CHLORIDE 600; 310; 30; 20 MG/100ML; MG/100ML; MG/100ML; MG/100ML
75 INJECTION, SOLUTION INTRAVENOUS CONTINUOUS
Status: DISCONTINUED | OUTPATIENT
Start: 2023-12-17 | End: 2023-12-17

## 2023-12-17 RX ORDER — POLYETHYLENE GLYCOL 3350 17 G/17G
17 POWDER, FOR SOLUTION ORAL 2 TIMES DAILY PRN
Status: DISCONTINUED | OUTPATIENT
Start: 2023-12-17 | End: 2023-12-19 | Stop reason: HOSPADM

## 2023-12-17 RX ORDER — LIDOCAINE 560 MG/1
1 PATCH PERCUTANEOUS; TOPICAL; TRANSDERMAL
Status: DISCONTINUED | OUTPATIENT
Start: 2023-12-17 | End: 2023-12-19 | Stop reason: HOSPADM

## 2023-12-17 RX ORDER — MAGNESIUM SULFATE HEPTAHYDRATE 40 MG/ML
1 INJECTION, SOLUTION INTRAVENOUS CONTINUOUS
Status: DISCONTINUED | OUTPATIENT
Start: 2023-12-17 | End: 2023-12-19 | Stop reason: HOSPADM

## 2023-12-17 RX ORDER — HYDRALAZINE HYDROCHLORIDE 20 MG/ML
5 INJECTION INTRAMUSCULAR; INTRAVENOUS ONCE AS NEEDED
Status: DISCONTINUED | OUTPATIENT
Start: 2023-12-17 | End: 2023-12-19 | Stop reason: HOSPADM

## 2023-12-17 RX ORDER — TRANEXAMIC ACID 100 MG/ML
1000 INJECTION, SOLUTION INTRAVENOUS ONCE AS NEEDED
Status: DISCONTINUED | OUTPATIENT
Start: 2023-12-17 | End: 2023-12-19 | Stop reason: HOSPADM

## 2023-12-17 RX ORDER — CARBOPROST TROMETHAMINE 250 UG/ML
250 INJECTION, SOLUTION INTRAMUSCULAR ONCE AS NEEDED
Status: DISCONTINUED | OUTPATIENT
Start: 2023-12-17 | End: 2023-12-19 | Stop reason: HOSPADM

## 2023-12-17 RX ORDER — ONDANSETRON HYDROCHLORIDE 2 MG/ML
4 INJECTION, SOLUTION INTRAVENOUS EVERY 6 HOURS PRN
Status: DISCONTINUED | OUTPATIENT
Start: 2023-12-17 | End: 2023-12-19 | Stop reason: HOSPADM

## 2023-12-17 RX ORDER — BISACODYL 10 MG/1
10 SUPPOSITORY RECTAL DAILY PRN
Status: DISCONTINUED | OUTPATIENT
Start: 2023-12-17 | End: 2023-12-19 | Stop reason: HOSPADM

## 2023-12-17 RX ORDER — METHYLERGONOVINE MALEATE 0.2 MG/ML
0.2 INJECTION INTRAVENOUS ONCE AS NEEDED
Status: DISCONTINUED | OUTPATIENT
Start: 2023-12-17 | End: 2023-12-19 | Stop reason: HOSPADM

## 2023-12-17 RX ORDER — DIPHENHYDRAMINE HYDROCHLORIDE 50 MG/ML
25 INJECTION INTRAMUSCULAR; INTRAVENOUS EVERY 6 HOURS PRN
Status: DISCONTINUED | OUTPATIENT
Start: 2023-12-17 | End: 2023-12-19 | Stop reason: HOSPADM

## 2023-12-17 RX ADMIN — SODIUM CHLORIDE, POTASSIUM CHLORIDE, SODIUM LACTATE AND CALCIUM CHLORIDE 75 ML/HR: 600; 310; 30; 20 INJECTION, SOLUTION INTRAVENOUS at 15:37

## 2023-12-17 RX ADMIN — ACETAMINOPHEN 975 MG: 325 TABLET ORAL at 06:22

## 2023-12-17 RX ADMIN — WITCH HAZEL 1 EACH: 5 CLOTH TOPICAL at 13:26

## 2023-12-17 RX ADMIN — ACETAMINOPHEN 975 MG: 325 TABLET ORAL at 18:50

## 2023-12-17 RX ADMIN — LEVOTHYROXINE SODIUM 12.5 MCG: 25 TABLET ORAL at 09:29

## 2023-12-17 RX ADMIN — IBUPROFEN 600 MG: 600 TABLET, FILM COATED ORAL at 06:23

## 2023-12-17 RX ADMIN — IBUPROFEN 600 MG: 600 TABLET, FILM COATED ORAL at 00:17

## 2023-12-17 RX ADMIN — ACETAMINOPHEN 975 MG: 325 TABLET ORAL at 12:36

## 2023-12-17 RX ADMIN — GENTAMICIN SULFATE 400 MG: 40 INJECTION, SOLUTION INTRAMUSCULAR; INTRAVENOUS at 00:17

## 2023-12-17 RX ADMIN — IBUPROFEN 600 MG: 600 TABLET, FILM COATED ORAL at 18:50

## 2023-12-17 RX ADMIN — ACETAMINOPHEN 975 MG: 325 TABLET ORAL at 00:16

## 2023-12-17 RX ADMIN — AMPICILLIN SODIUM 2 G: 2 INJECTION, POWDER, FOR SOLUTION INTRAVENOUS at 01:22

## 2023-12-17 RX ADMIN — BENZOCAINE AND LEVOMENTHOL 1 APPLICATION: 200; 5 SPRAY TOPICAL at 13:26

## 2023-12-17 RX ADMIN — IBUPROFEN 600 MG: 600 TABLET, FILM COATED ORAL at 12:36

## 2023-12-17 ASSESSMENT — PAIN - FUNCTIONAL ASSESSMENT
PAIN_FUNCTIONAL_ASSESSMENT: 0-10
PAIN_FUNCTIONAL_ASSESSMENT: 0-10

## 2023-12-17 ASSESSMENT — PAIN SCALES - GENERAL
PAINLEVEL_OUTOF10: 0 - NO PAIN
PAINLEVEL_OUTOF10: 3
PAINLEVEL_OUTOF10: 3
PAINLEVEL_OUTOF10: 0 - NO PAIN
PAINLEVEL_OUTOF10: 0 - NO PAIN
PAINLEVEL_OUTOF10: 3
PAINLEVEL_OUTOF10: 0 - NO PAIN
PAINLEVEL_OUTOF10: 0 - NO PAIN
PAINLEVEL_OUTOF10: 1

## 2023-12-17 ASSESSMENT — PAIN DESCRIPTION - LOCATION: LOCATION: BUTTOCKS

## 2023-12-17 ASSESSMENT — PAIN DESCRIPTION - DESCRIPTORS: DESCRIPTORS: SORE

## 2023-12-17 NOTE — PROGRESS NOTES
Postpartum Progress Note      Assessment/Plan     Elidia Lindsay is a 33 y.o., , who had a Vaginal, Spontaneous   delivery on 2023  at 38w2d and is now postpartum day 1.    #gHTN -> PEC w SF  - diagnosed by gHTN and new rising Cr  - asymptomatic  - BP normotensive on no meds  - HELLP labs significant for Cr 1 -> 1.37 -> 1.58  and Plt: 131 -> 96 -> 94  P:C Not ordered or collected  - Reviewed patient's risk factors: Use of assisted reproductive technology  - Transfer pt to Mac 2 for 24hr Mg gtt  - HOLD lovenox while plt <100  - Repeat HELLP labs ordered for AM, trend per MD team on Mac 2    #Postpartum  - continue routine postpartum care  - pain well controlled on po medications  - DVT Score: 3 ; SCDs  - The patient's blood type is O NEG. The baby's blood type is O POS . Rhogam is indicated.    #Maternal Well-Being  - emotional support provided  - bonding with infant  - Contraception: IVF, declines PPBC    #[place] Feeding  - breastfeeding/pumping encouraged; lactation consult prn    #Dispo  - Discussed plan with Dr. Hill. Pt transferred to Mac 2 for initiation of Mg  - The signs and symptoms of PEC were reviewed with the patient, including unrelenting headache, vision changes/blurred vision, and RUQ pain  - BP cuff for home for checking BP twice a day  - Pt instructed to call primary OB if SBP > 160 or DBP > 110 or if development of PEC symptoms   - On discharge, follow up with primary OB in:       - 2-5 days for BP check       - 4-6 week for post-partum visit       Principal Problem:    38 weeks gestation of pregnancy  Active Problems:    History of general anesthesia    Hypothyroidism         Subjective   Denies HA, N/V, RUQ pain, vision changes.   Meeting all postpartum milestones- ambulating independently, passing flatus, tolerating PO intake, lochia light, voiding spontaneously, and pain well controlled with PO meds.    Objective   Physical Exam:  General: well appearing, well nourished,  postpartum  Obstetric: fundus firm below umbilicus, lochia light  Skin: Warm, dry; no rashes/lesions/erythema  Breast: No masses, nipple discharge  Neuro: A/Ox3, conversational, no gross motor deficit   GI: no distension, appropriately tender, soft, +BS  Respiratory: Even and unlabored on RA, LSCTA BL  Cardiovascular: Trace BLE edema; No erythema, warmth  Psych: appropriate mood and affect    Last Vitals:  Temp Pulse Resp BP MAP Pulse Ox   36.2 °C (97.2 °F) 91 18 130/87   98 %       Vitals Min/Max Last 24 Hours:  Temp  Min: 36 °C (96.8 °F)  Max: 38.8 °C (101.8 °F)  Pulse  Min: 91  Max: 139  Resp  Min: 16  Max: 18  BP  Min: 115/79  Max: 174/95    Lab Data:  Lab Results   Component Value Date    WBC 16.2 (H) 12/17/2023    HGB 11.0 (L) 12/17/2023    HCT 31.6 (L) 12/17/2023    PLT 94 (L) 12/17/2023

## 2023-12-17 NOTE — PROGRESS NOTES
Intrapartum Progress Note    Assessment/Plan   Elidia Lindsay is a 33 y.o.  at 38w2d. GEOVANNY: 2023, by Ultrasound.     Active labor  -CEFM: cat 1 currently  -pitocin currently at 18, continue per protocol  -GBS pos, pcn  -epidural infusing    gHTN  - dx by mild range pressures greater than 4 hours apart  - asx  - HELLP Labs wnl, P:C 0.11 at baseline     GDMA1  - q4 hour BG checks  - POCT on admission 76    Gestational thrombocytopenia  -plt 131    Pt seen and discussed with Dr. Aneta Marmolejo MD PGY-1      Principal Problem:    38 weeks gestation of pregnancy  Active Problems:    History of general anesthesia    Hypothyroidism    Pregnancy Problems (from 23 to present)       Problem Noted Resolved    38 weeks gestation of pregnancy 12/15/2023 by Vicky Lewis MD No    Priority:  Medium      Family history of brucellosis 2023 by Lexie Gaxiola MD No    Priority:  Medium      Overview Signed 2023  5:18 PM by Lexie Gaxiola MD      with uncertain diagnosis of possible brucellosis, NOT confirmed         Diet controlled gestational diabetes mellitus (GDM) in third trimester 2023 by Brian Acuña MD No    Priority:  Medium      Rh negative state in antepartum period, third trimester 10/4/2023 by Marguerite Srinivasan MD No    Priority:  Medium      Cervical insufficiency during pregnancy, antepartum, third trimester 10/4/2023 by Marguerite Srinivasan MD No    Priority:  Medium      Cervical cerclage suture present, antepartum 10/4/2023 by Marguerite Srinivasan MD No    Priority:  Medium      Thyroid disease during pregnancy in third trimester 10/4/2023 by Marguerite Srinivasan MD No    Priority:  Medium      Recurrent pregnancy loss, currently pregnant 2023 by Mickey Raygoza No    Priority:  Medium      Pregnancy conceived through in vitro fertilization 2023 by Mickey Raygoza No    Priority:  Medium              Subjective   Pt feeling cramping, intermittent  pressure    Objective   Last Vitals:  Temp Pulse Resp BP MAP Pulse Ox   37.1 °C (98.8 °F) 106 16 (!) 142/97   98 %     Vitals Min/Max Last 24 Hours:  Temp  Min: 36 °C (96.8 °F)  Max: 37.6 °C (99.7 °F)  Pulse  Min: 82  Max: 114  Resp  Min: 16  Max: 18  BP  Min: 117/74  Max: 142/97    Intake/Output:    Intake/Output Summary (Last 24 hours) at 12/16/2023 1927  Last data filed at 12/16/2023 1830  Gross per 24 hour   Intake 301 ml   Output 4400 ml   Net -4099 ml       Physical Examination:  Cervical Exam  Dilation: 8  Effacement (%): 90  Fetal Station: 0  Method: Manual  OB Examiner: Jaleel BOSWELL  Fetal Assessment  Movement: Present  Mode: External US  Baseline Fetal Heart Rate (bpm): 150 bpm  Baseline Classification: Normal  Variability: Moderate (Between 6 and 25 BPM)  Pattern: Accelerations  Pattern Observations:  (Baseline chnage to 140 bpm from 9055-8095)  FHR Category: Category I  Multiple Births: No      Contraction Frequency: 2-4          Lab Review:  Lab Results   Component Value Date    WBC 7.6 12/15/2023    HGB 13.1 12/15/2023    HCT 37.5 12/15/2023     (L) 12/15/2023

## 2023-12-17 NOTE — CARE PLAN
Problem: Antepartum  Goal: FHR remains reassuring  Outcome: Met  Goal: Minimize anxiety/maximize coping  Outcome: Met   The patient's goals for the shift include Minimal anxiety and to have a healthy baby and safe delivery.    The clinical goals for the shift include Reassuring uterine activity and FHR with and without pitocin.

## 2023-12-17 NOTE — ANESTHESIA POSTPROCEDURE EVALUATION
Patient: Elidia Lindsay    Procedure Summary       Date: 12/15/23 Room / Location:     Anesthesia Start: 1836 Anesthesia Stop: 12/16/23 2148    Procedure: Labor Analgesia Diagnosis:     Scheduled Providers:  Responsible Provider: Amari Tom DO    Anesthesia Type: epidural ASA Status: 3            Anesthesia Type: epidural    Vitals Value Taken Time   /79 12/17/23 0803   Temp 36.4 12/17/23 0803   Pulse 100 12/17/23 0803   Resp 18 12/17/23 0803   SpO2 96 12/17/23 0803       Anesthesia Post Evaluation    Patient location during evaluation: floor  Patient participation: complete - patient participated  Level of consciousness: awake and alert  Pain management: adequate  Airway patency: patent  Cardiovascular status: acceptable  Respiratory status: room air and acceptable  Hydration status: acceptable  Postoperative Nausea and Vomiting: none        No notable events documented.

## 2023-12-17 NOTE — L&D DELIVERY NOTE
OB Delivery Note  2023  Elidia Lindsay  33 y.o.   Vaginal, Spontaneous        Gestational Age: 38w3d  /Para:   Quantitative Blood Loss: Admission to Discharge: 500 mL (12/15/2023 11:33 AM - 2023  1:15 AM)    . Manual removal of the placenta. EBL 500cc. L sided sulcal tear - repaired. Persistent trickle and lower uterine segment atony. Buccal cytotec, hemabate and 2nd pit bolus given. Given persistent trickle, Thais placed in the standard fashion. Shortly after delivery, patient had fever to 38.8C. given fetal tachycardia to 170s during second stage, presumed IAI/endometritis. Amp/gent given.    Mari Lindsay [[id number]]      Labor Events    Sac identifier: Sac 1  Rupture date/time: 12/15/2023 2053  Rupture type: Artificial  Fluid color: Clear  Fluid odor: None  Labor type: Induced Onset of Labor  Labor allowed to proceed with plans for an attempted vaginal birth?: Yes  Induction: Misoprostol, Owens/EASI  First cervical ripening date/time: 12/15/2023 1423  Induction date/time: 20235  Induction indications: Hypertensive Disorder of Pregnancy  Complications: Uterine Atony, Hemorrhage       Labor Event Times    Labor onset date/time: 12/15/2023 1423  Dilation complete date/time: 2023  Start pushing date/time: 2023       Labor Length    1st stage: 30h 17m  2nd stage: 1h 08m  3rd stage: 0h 18m       Placenta    Placenta delivery date/time: 2023  Placenta removal: Manual removal  Placenta appearance: Intact  Placenta disposition: pathology       Cord    Vessels: 3 vessels  Complications: None  Delayed cord clamping?: Yes  Cord clamped date/time: 2023  Cord blood disposition: Lab  Gases sent?: No       Lacerations    Episiotomy: None  Perineal laceration: None  Sulcal laceration?: Yes  Sulcal laceration location: left  Repair suture: 3-0 Synthetic Suture       Anesthesia    Method: Epidural       Operative Delivery    Forceps attempted?:  No  Vacuum extractor attempted?: No       Shoulder Dystocia    Shoulder dystocia present?: No        Delivery    Time head delivered: 2023 21:48:20  Birth date/time: 2023 21:48:40  Delivery type: Vaginal, Spontaneous  Complications: Uterine Atony, Hemorrhage       Resuscitation    Method: Suctioning, Tactile stimulation       Apgars    Living status: Living  Apgar Component Scores:  1 min.:  5 min.:  10 min.:  15 min.:  20 min.:    Skin color:  1  1       Heart rate:  2  2       Reflex irritability:  2  2       Muscle tone:  1  2       Respiratory effort:  2  2       Total:  8  9       Apgars assigned by: THANG KARIMI RN       Delivery Providers    Delivering clinician: Lexie Cornell MD   Provider Role    Court Diggs, RN Delivery Nurse    Sherine Madrid, RN Nursery Nurse    Bouchra Cervantes MD Resident    Dawn Marmolejo MD Resident    Ty Kim, RN Delivery Nurse                 Dawn Marmolejo MD

## 2023-12-17 NOTE — DISCHARGE INSTRUCTIONS

## 2023-12-17 NOTE — SIGNIFICANT EVENT
Patient with 2 SRBPs 15 minutes apart. Per nursing report, SRBP in setting  of patient shaking arm. Repeat BP 10 min later was mild range. Discussed with patient sPEC diagnosis criteria, risk of seizure/stroke, and treatment of sPEC. Given uncertainty of sustained SRBP, discussed with patient starting Magnesium drip for seizure prophylaxis vs monitoring blood pressures. At this time, patient desires further monitoring. Discussed that if patient has additional SRBPs, would recommend magnesium at at that time. Patient expressed understanding    Discussed with Dr. Helen Marmolejo MD PGY-1

## 2023-12-17 NOTE — SIGNIFICANT EVENT
"Elidia Lindsay is a 33 y.o.  now PPD #1 from a . Patient down from PP unit and now on Mac 2 for new diagnosis of sPEC, requiring initiation of magnesium. House office to bedside to explain plan of care. Patient resting with magnesium infusing. Denies HA, blurry vision, SOB, CP, or RUQ pain. Partner and infant at bedside. Patient understands diagnosis and is amenable to initiating magnesium.        /80   Pulse 97   Temp 36 °C (96.8 °F) (Temporal)   Resp 18   Ht 1.549 m (5' 1\")   Wt 63.8 kg (140 lb 10.5 oz)   LMP 2023   SpO2 99%   Breastfeeding No   BMI 26.58 kg/m²        A/P  - PPD#1,  c/b PPH.   - sPEC dx/d by gHTN + thrombocytopenia   - Baseline Plt count 196 >> 131>96>94  - Baseline Cr. 0.9 >>1.00>1.37>1.58  - Will obtain STAT CBC, CMP. Repeat ordered for AM. Continue to trend.   - Magnesium infusing w/ 6g bolus, 1g/hour maintenance x24h, per protocol  - Crossed for Plts x2   - Currently asx     Pt. Seen & d/w Elsy Rojas and Perry BISHOP. Cheryle Waters MD  PGY-1, Obstetrics & Gynecology   [facility]                       "

## 2023-12-18 LAB
ALBUMIN SERPL BCP-MCNC: 2.6 G/DL (ref 3.4–5)
ALBUMIN SERPL BCP-MCNC: 2.8 G/DL (ref 3.4–5)
ALP SERPL-CCNC: 154 U/L (ref 33–110)
ALP SERPL-CCNC: 174 U/L (ref 33–110)
ALT SERPL W P-5'-P-CCNC: 15 U/L (ref 7–45)
ALT SERPL W P-5'-P-CCNC: 16 U/L (ref 7–45)
ANION GAP SERPL CALC-SCNC: 15 MMOL/L (ref 10–20)
ANION GAP SERPL CALC-SCNC: 9 MMOL/L (ref 10–20)
AST SERPL W P-5'-P-CCNC: 33 U/L (ref 9–39)
AST SERPL W P-5'-P-CCNC: 34 U/L (ref 9–39)
BILIRUB SERPL-MCNC: 0.2 MG/DL (ref 0–1.2)
BILIRUB SERPL-MCNC: 0.3 MG/DL (ref 0–1.2)
BUN SERPL-MCNC: 15 MG/DL (ref 6–23)
BUN SERPL-MCNC: 16 MG/DL (ref 6–23)
CALCIUM SERPL-MCNC: 7.6 MG/DL (ref 8.6–10.6)
CALCIUM SERPL-MCNC: 7.7 MG/DL (ref 8.6–10.6)
CHLORIDE SERPL-SCNC: 106 MMOL/L (ref 98–107)
CHLORIDE SERPL-SCNC: 109 MMOL/L (ref 98–107)
CO2 SERPL-SCNC: 24 MMOL/L (ref 21–32)
CO2 SERPL-SCNC: 25 MMOL/L (ref 21–32)
CREAT SERPL-MCNC: 1.27 MG/DL (ref 0.5–1.05)
CREAT SERPL-MCNC: 1.31 MG/DL (ref 0.5–1.05)
ERYTHROCYTE [DISTWIDTH] IN BLOOD BY AUTOMATED COUNT: 13.8 % (ref 11.5–14.5)
ERYTHROCYTE [DISTWIDTH] IN BLOOD BY AUTOMATED COUNT: 13.8 % (ref 11.5–14.5)
FETAL MATERNAL SCREEN: NORMAL
GFR SERPL CREATININE-BSD FRML MDRD: 56 ML/MIN/1.73M*2
GFR SERPL CREATININE-BSD FRML MDRD: 58 ML/MIN/1.73M*2
GLUCOSE SERPL-MCNC: 118 MG/DL (ref 74–99)
GLUCOSE SERPL-MCNC: 67 MG/DL (ref 74–99)
HCT VFR BLD AUTO: 30.9 % (ref 36–46)
HCT VFR BLD AUTO: 33.4 % (ref 36–46)
HGB BLD-MCNC: 10.6 G/DL (ref 12–16)
HGB BLD-MCNC: 11.5 G/DL (ref 12–16)
MCH RBC QN AUTO: 32.4 PG (ref 26–34)
MCH RBC QN AUTO: 32.7 PG (ref 26–34)
MCHC RBC AUTO-ENTMCNC: 34.3 G/DL (ref 32–36)
MCHC RBC AUTO-ENTMCNC: 34.4 G/DL (ref 32–36)
MCV RBC AUTO: 95 FL (ref 80–100)
MCV RBC AUTO: 95 FL (ref 80–100)
NRBC BLD-RTO: 0 /100 WBCS (ref 0–0)
NRBC BLD-RTO: 0 /100 WBCS (ref 0–0)
PLATELET # BLD AUTO: 102 X10*3/UL (ref 150–450)
PLATELET # BLD AUTO: 121 X10*3/UL (ref 150–450)
POTASSIUM SERPL-SCNC: 4.3 MMOL/L (ref 3.5–5.3)
POTASSIUM SERPL-SCNC: 4.4 MMOL/L (ref 3.5–5.3)
PROT SERPL-MCNC: 4.7 G/DL (ref 6.4–8.2)
PROT SERPL-MCNC: 5.1 G/DL (ref 6.4–8.2)
RBC # BLD AUTO: 3.27 X10*6/UL (ref 4–5.2)
RBC # BLD AUTO: 3.52 X10*6/UL (ref 4–5.2)
SODIUM SERPL-SCNC: 139 MMOL/L (ref 136–145)
SODIUM SERPL-SCNC: 141 MMOL/L (ref 136–145)
T PALLIDUM AB SER QL: NONREACTIVE
WBC # BLD AUTO: 10 X10*3/UL (ref 4.4–11.3)
WBC # BLD AUTO: 10.6 X10*3/UL (ref 4.4–11.3)

## 2023-12-18 PROCEDURE — 94760 N-INVAS EAR/PLS OXIMETRY 1: CPT

## 2023-12-18 PROCEDURE — 1100000001 HC PRIVATE ROOM DAILY

## 2023-12-18 PROCEDURE — 84075 ASSAY ALKALINE PHOSPHATASE: CPT

## 2023-12-18 PROCEDURE — 99232 SBSQ HOSP IP/OBS MODERATE 35: CPT

## 2023-12-18 PROCEDURE — 85027 COMPLETE CBC AUTOMATED: CPT

## 2023-12-18 PROCEDURE — 36415 COLL VENOUS BLD VENIPUNCTURE: CPT | Performed by: STUDENT IN AN ORGANIZED HEALTH CARE EDUCATION/TRAINING PROGRAM

## 2023-12-18 PROCEDURE — 85461 HEMOGLOBIN FETAL: CPT

## 2023-12-18 PROCEDURE — 85027 COMPLETE CBC AUTOMATED: CPT | Performed by: STUDENT IN AN ORGANIZED HEALTH CARE EDUCATION/TRAINING PROGRAM

## 2023-12-18 PROCEDURE — 2500000004 HC RX 250 GENERAL PHARMACY W/ HCPCS (ALT 636 FOR OP/ED)

## 2023-12-18 PROCEDURE — 2500000001 HC RX 250 WO HCPCS SELF ADMINISTERED DRUGS (ALT 637 FOR MEDICARE OP)

## 2023-12-18 PROCEDURE — 96372 THER/PROPH/DIAG INJ SC/IM: CPT

## 2023-12-18 PROCEDURE — 36415 COLL VENOUS BLD VENIPUNCTURE: CPT

## 2023-12-18 PROCEDURE — 3E0234Z INTRODUCTION OF SERUM, TOXOID AND VACCINE INTO MUSCLE, PERCUTANEOUS APPROACH: ICD-10-PCS | Performed by: OBSTETRICS & GYNECOLOGY

## 2023-12-18 PROCEDURE — 80053 COMPREHEN METABOLIC PANEL: CPT | Performed by: STUDENT IN AN ORGANIZED HEALTH CARE EDUCATION/TRAINING PROGRAM

## 2023-12-18 PROCEDURE — 99199 UNLISTED SPECIAL SVC PX/RPRT: CPT

## 2023-12-18 RX ADMIN — ACETAMINOPHEN 975 MG: 325 TABLET ORAL at 00:24

## 2023-12-18 RX ADMIN — IBUPROFEN 600 MG: 600 TABLET, FILM COATED ORAL at 12:10

## 2023-12-18 RX ADMIN — SODIUM CHLORIDE, POTASSIUM CHLORIDE, SODIUM LACTATE AND CALCIUM CHLORIDE 75 ML/HR: 600; 310; 30; 20 INJECTION, SOLUTION INTRAVENOUS at 02:24

## 2023-12-18 RX ADMIN — MAGNESIUM SULFATE IN WATER 1 G/HR: 20 INJECTION, SOLUTION INTRAVENOUS at 04:51

## 2023-12-18 RX ADMIN — IBUPROFEN 600 MG: 600 TABLET, FILM COATED ORAL at 06:24

## 2023-12-18 RX ADMIN — IBUPROFEN 600 MG: 600 TABLET, FILM COATED ORAL at 00:24

## 2023-12-18 RX ADMIN — LEVOTHYROXINE SODIUM 12.5 MCG: 25 TABLET ORAL at 04:33

## 2023-12-18 RX ADMIN — ACETAMINOPHEN 975 MG: 325 TABLET ORAL at 06:24

## 2023-12-18 RX ADMIN — ACETAMINOPHEN 975 MG: 325 TABLET ORAL at 12:10

## 2023-12-18 RX ADMIN — ACETAMINOPHEN 975 MG: 325 TABLET ORAL at 18:43

## 2023-12-18 RX ADMIN — HUMAN RHO(D) IMMUNE GLOBULIN 300 MCG: 300 INJECTION, SOLUTION INTRAMUSCULAR at 04:52

## 2023-12-18 RX ADMIN — IBUPROFEN 600 MG: 600 TABLET, FILM COATED ORAL at 18:44

## 2023-12-18 ASSESSMENT — PAIN SCALES - GENERAL
PAINLEVEL_OUTOF10: 0 - NO PAIN

## 2023-12-18 NOTE — PROGRESS NOTES
Postpartum Progress Note    Assessment/Plan   Elidia Lindsay is a 33 y.o., , who delivered at 38w2d gestation and is now postpartum day 1.    #PEC w/ SF  - diagnosed by gHTN and newly rising Cr  - asymptomatic  - BP normotensive to mild range on no meds  - HELLP labs significant for Cr 1 -> 1.37 -> 1.58 -> 1.54 and Plt: 131 -> 96 -> 94 -> 98 (PM )  - Mg gtt 1g/hr in setting of creatinine of 1.54 (PM )  - HOLD lovenox while plt <100  - In setting of stable labs and clinical status, okay for transfer to Mac 5. Planning for repeat HELLP labs ordered for AM     #Postpartum  - continue routine postpartum care  - pain well controlled on po medications  - DVT Score: 3 ; SCDs  - The patient's blood type is O NEG. The baby's blood type is O POS . Rhogam is indicated.  - Contraception: IVF, declines PPBC  - GDMA1, for 2hr gtt in PP period      #[place] Feeding  - breastfeeding/pumping encouraged; lactation consult prn     #Dispo  - BP cuff for home for checking BP twice a day  - Pt instructed to call primary OB if SBP > 160 or DBP > 110 or if development of PEC symptoms   - On discharge, follow up with primary OB in:       - 2-5 days for BP check       - 4-6 week for post-partum visit       Subjective   Reporting some floaters after initiation of magnesium. Denies HA, scotomas, CP, SOB and RUQ pain.     Objective   Allergies:   Patient has no known allergies.         Last Vitals:  Temp Pulse Resp BP MAP Pulse Ox   36 °C (96.8 °F) 93 18 (!) 127/91   99 %     Vitals Min/Max Last 24 Hours:  Temp  Min: 36 °C (96.8 °F)  Max: 38.8 °C (101.8 °F)  Pulse  Min: 71  Max: 139  Resp  Min: 16  Max: 18  BP  Min: 108/72  Max: 174/95    Intake/Output:     Intake/Output Summary (Last 24 hours) at 2023 1906  Last data filed at 2023 1859  Gross per 24 hour   Intake 555.8 ml   Output 3650 ml   Net -3094.2 ml       Physical Exam:  Constitutional: No visible distress, alert and cooperative  Eyes: clear  sclera  Head/Neck: Normocephalic  Respiratory/Thorax: Normal respiratory effort on RA  Cardiovascular: regular rate   Gastrointestinal: Abdomen soft, nondistended   Musculoskeletal: grossly normal ROM  Neurological: alert and oriented, DTRs 2+ in BUEs   Psychological: Appropriate mood and behavior  Skin: warm, dry, no lesions     Lab Data:  Lab Results   Component Value Date    ABO O 12/15/2023    LABRH NEG 12/15/2023    ABSCRN POS 12/15/2023     Lab Results   Component Value Date    WBC 13.4 (H) 12/17/2023    HGB 11.0 (L) 12/17/2023    HCT 31.7 (L) 12/17/2023    PLT 98 (L) 12/17/2023     Lab Results   Component Value Date    GLUCOSE 93 12/17/2023     12/17/2023    K 4.3 12/17/2023     (H) 12/17/2023    CO2 22 12/17/2023    ANIONGAP 13 12/17/2023    BUN 15 12/17/2023    CREATININE 1.54 (H) 12/17/2023    EGFR 46 (L) 12/17/2023    CALCIUM 8.1 (L) 12/17/2023    ALBUMIN 2.6 (L) 12/17/2023    PROT 4.8 (L) 12/17/2023    ALKPHOS 169 (H) 12/17/2023    ALT 15 12/17/2023    AST 39 12/17/2023    BILITOT 0.3 12/17/2023

## 2023-12-18 NOTE — PROGRESS NOTES
Postpartum Progress Note    Assessment/Plan   Elidia Lindsay is a 33 y.o., , who delivered at 38w2d gestation and is now postpartum day 2 with sPEC.    #PEC w/ SF  - diagnosed by gHTN + thrombocytopenia, newly rising Cr   - Currently normotensive, asx  - HELLP labs significant for Cr 1 -> 1.37 -> 1.58 -> 1.54 --> 1.27 and Plt: 131 -> 96 -> 94 -> 98 --> 102  - Mg gtt 1g/hr in setting of SHEILA  - HOLD lovenox while plt <100  - Labs currently improving. F/up AM labs. Continue to trend     #Postpartum  - continue routine postpartum care  - pain well controlled on po medications  - DVT Score: 3 ; SCDs  - The patient's blood type is O NEG. The baby's blood type is O POS . Rhogam is indicated.  - Contraception: IVF, declines PPBC  - GDMA1, for 2hr gtt in PP period      #[place] Feeding  - Formula-feeding. Does not desire to see lactation.      #Dispo  - BP cuff for home for checking BP twice a day  - Pt instructed to call primary OB if SBP > 160 or DBP > 110 or if development of PEC symptoms   - On discharge, follow up with primary OB in:       - 2-5 days for BP check       - 4-6 week for post-partum visit       Seen & dw/ Dr. Louisa Waters MD  PGY-1, Obstetrics & Gynecology   [facility]       Principal Problem:    38 weeks gestation of pregnancy  Active Problems:    History of general anesthesia    Hypothyroidism    Pregnancy Problems (from 23 to present)       Problem Noted Resolved    38 weeks gestation of pregnancy 12/15/2023 by Vicky Lewis MD No    Priority:  Medium      Family history of brucellosis 2023 by Lexie Gaxiola MD No    Priority:  Medium      Overview Signed 2023  5:18 PM by Lexie Gaxiola MD      with uncertain diagnosis of possible brucellosis, NOT confirmed         Diet controlled gestational diabetes mellitus (GDM) in third trimester 2023 by Brian Acuña MD No    Priority:  Medium      Rh negative state in  antepartum period, third trimester 10/4/2023 by Marguerite Srinivasan MD No    Priority:  Medium      Cervical insufficiency during pregnancy, antepartum, third trimester 10/4/2023 by Marguerite Srinivasan MD No    Priority:  Medium      Cervical cerclage suture present, antepartum 10/4/2023 by Marguerite Srinivasan MD No    Priority:  Medium      Thyroid disease during pregnancy in third trimester 10/4/2023 by Marguerite Srinivasan MD No    Priority:  Medium      Recurrent pregnancy loss, currently pregnant 9/25/2023 by Mickey Raygoza No    Priority:  Medium      Pregnancy conceived through in vitro fertilization 9/25/2023 by Mickey Raygoza No    Priority:  Medium            Hospital course:  pre-eclampsia with severe features  Vaginal Birth  Patient is not breastfeedingThe patient's blood type is O NEG. The baby's blood type is O POS . Rhogam indicated and given.    Subjective   Her pain is well controlled with current medications  She is passing flatus. Has not yet had a bowel movement.  She is not ambulating well due to magnesium infusion  She is tolerating a Adult diet Regular  She reports no breast or nursing problems. She is formula-feeding  She denies emotional concerns today   Her plan for contraception is none  Lochia is moderate     Patient recovering well, though feels fatigued from magnesium infusion. Re-discussed sPEC diagnosis and indication for magnesium for 24h PP. She denies HA, blurry vision, CP, SOB, or RUQ pain.     Objective   Allergies:   Patient has no known allergies.         Last Vitals:  Temp Pulse Resp BP MAP Pulse Ox   36.8 °C (98.2 °F) 69 18 127/88   97 %     Vitals Min/Max Last 24 Hours:  Temp  Min: 36 °C (96.8 °F)  Max: 36.8 °C (98.2 °F)  Pulse  Min: 68  Max: 109  Resp  Min: 16  Max: 18  BP  Min: 99/76  Max: 136/92    Intake/Output:     Intake/Output Summary (Last 24 hours) at 12/18/2023 1022  Last data filed at 12/18/2023 0850  Gross per 24 hour   Intake 1990.2 ml   Output 5550 ml   Net -3559.8 ml        Physical Exam:  Genitourinary:      Deferred  Cardiovascular:      Rate and Rhythm: Normal rate.   Pulmonary:      Effort: Pulmonary effort is normal.      Breath sounds: CTAB  Abdominal:      Palpations: Abdomen is soft.      Comments: Mildly distended, fundus firm at level of umbilicus.    Neurological:      General: No focal deficit present.      Mental Status: She is alert.   Skin:     General: Skin is warm and dry.   Psychiatric:         Mood and Affect: Mood normal.         Behavior: Behavior normal.         Lab Data:  Lab Results   Component Value Date    WBC 10.0 12/18/2023    HGB 10.6 (L) 12/18/2023    HCT 30.9 (L) 12/18/2023     (L) 12/18/2023     Lab Results   Component Value Date    GLUCOSE 67 (L) 12/18/2023     12/18/2023    K 4.3 12/18/2023     (H) 12/18/2023    CO2 25 12/18/2023    ANIONGAP 9 (L) 12/18/2023    BUN 15 12/18/2023    CREATININE 1.27 (H) 12/18/2023    EGFR 58 (L) 12/18/2023    CALCIUM 7.7 (L) 12/18/2023    ALBUMIN 2.6 (L) 12/18/2023    PROT 4.7 (L) 12/18/2023    ALKPHOS 154 (H) 12/18/2023    ALT 15 12/18/2023    AST 33 12/18/2023    BILITOT 0.2 12/18/2023

## 2023-12-18 NOTE — SIGNIFICANT EVENT
BP Cuff and Home monitoring  Patient meets criteria for home monitoring of blood pressure post discharge.  Met with patient to assess for availability of home BP monitor.  Patient stated she owns home BP monitor. Patient educated on importance of continuing to monitor BP at home, recording BP on home monitoring log and s/sx of when to call her provider.  Pt verbalized understanding the above information.

## 2023-12-18 NOTE — CARE PLAN
The patient's goals for the shift include stop the mag    The clinical goals for the shift include no signs or symptoms of mag toxicity    VSS, mag stopped today at 1530, voiding, pain well controlled, bleeding minimal, bottle feeding.

## 2023-12-19 VITALS
BODY MASS INDEX: 26.56 KG/M2 | WEIGHT: 140.65 LBS | DIASTOLIC BLOOD PRESSURE: 91 MMHG | OXYGEN SATURATION: 100 % | HEIGHT: 61 IN | SYSTOLIC BLOOD PRESSURE: 135 MMHG | TEMPERATURE: 98.1 F | HEART RATE: 83 BPM | RESPIRATION RATE: 18 BRPM

## 2023-12-19 PROBLEM — O14.10 PRE-ECLAMPSIA, SEVERE (HHS-HCC): Status: ACTIVE | Noted: 2023-12-19

## 2023-12-19 PROBLEM — Z3A.38 38 WEEKS GESTATION OF PREGNANCY (HHS-HCC): Status: RESOLVED | Noted: 2023-12-15 | Resolved: 2023-12-19

## 2023-12-19 LAB
ALBUMIN SERPL BCP-MCNC: 3.1 G/DL (ref 3.4–5)
ALP SERPL-CCNC: 169 U/L (ref 33–110)
ALT SERPL W P-5'-P-CCNC: 17 U/L (ref 7–45)
ANION GAP SERPL CALC-SCNC: 15 MMOL/L (ref 10–20)
AST SERPL W P-5'-P-CCNC: 32 U/L (ref 9–39)
BILIRUB SERPL-MCNC: 0.3 MG/DL (ref 0–1.2)
BUN SERPL-MCNC: 20 MG/DL (ref 6–23)
CALCIUM SERPL-MCNC: 8 MG/DL (ref 8.6–10.6)
CHLORIDE SERPL-SCNC: 109 MMOL/L (ref 98–107)
CO2 SERPL-SCNC: 22 MMOL/L (ref 21–32)
CREAT SERPL-MCNC: 1.09 MG/DL (ref 0.5–1.05)
ERYTHROCYTE [DISTWIDTH] IN BLOOD BY AUTOMATED COUNT: 13.7 % (ref 11.5–14.5)
GFR SERPL CREATININE-BSD FRML MDRD: 69 ML/MIN/1.73M*2
GLUCOSE SERPL-MCNC: 78 MG/DL (ref 74–99)
HCT VFR BLD AUTO: 33.7 % (ref 36–46)
HGB BLD-MCNC: 11.5 G/DL (ref 12–16)
MCH RBC QN AUTO: 32.9 PG (ref 26–34)
MCHC RBC AUTO-ENTMCNC: 34.1 G/DL (ref 32–36)
MCV RBC AUTO: 96 FL (ref 80–100)
NRBC BLD-RTO: 0 /100 WBCS (ref 0–0)
PLATELET # BLD AUTO: 164 X10*3/UL (ref 150–450)
POTASSIUM SERPL-SCNC: 4.7 MMOL/L (ref 3.5–5.3)
PROT SERPL-MCNC: 5.8 G/DL (ref 6.4–8.2)
RBC # BLD AUTO: 3.5 X10*6/UL (ref 4–5.2)
SODIUM SERPL-SCNC: 141 MMOL/L (ref 136–145)
WBC # BLD AUTO: 9.5 X10*3/UL (ref 4.4–11.3)

## 2023-12-19 PROCEDURE — 2500000004 HC RX 250 GENERAL PHARMACY W/ HCPCS (ALT 636 FOR OP/ED)

## 2023-12-19 PROCEDURE — 80053 COMPREHEN METABOLIC PANEL: CPT | Performed by: NURSE PRACTITIONER

## 2023-12-19 PROCEDURE — 2500000001 HC RX 250 WO HCPCS SELF ADMINISTERED DRUGS (ALT 637 FOR MEDICARE OP)

## 2023-12-19 PROCEDURE — 36415 COLL VENOUS BLD VENIPUNCTURE: CPT | Performed by: NURSE PRACTITIONER

## 2023-12-19 PROCEDURE — 85027 COMPLETE CBC AUTOMATED: CPT | Performed by: NURSE PRACTITIONER

## 2023-12-19 PROCEDURE — 2500000004 HC RX 250 GENERAL PHARMACY W/ HCPCS (ALT 636 FOR OP/ED): Performed by: NURSE PRACTITIONER

## 2023-12-19 RX ORDER — IBUPROFEN 600 MG/1
600 TABLET ORAL EVERY 6 HOURS
Qty: 30 TABLET | Refills: 0 | Status: SHIPPED | OUTPATIENT
Start: 2023-12-19 | End: 2024-02-02 | Stop reason: WASHOUT

## 2023-12-19 RX ORDER — ACETAMINOPHEN 325 MG/1
975 TABLET ORAL EVERY 6 HOURS
Qty: 60 TABLET | Refills: 0 | Status: SHIPPED | OUTPATIENT
Start: 2023-12-19 | End: 2024-02-02 | Stop reason: WASHOUT

## 2023-12-19 RX ORDER — NIFEDIPINE 30 MG/1
30 TABLET, FILM COATED, EXTENDED RELEASE ORAL
Qty: 30 TABLET | Refills: 3 | Status: SHIPPED | OUTPATIENT
Start: 2023-12-20 | End: 2024-02-02 | Stop reason: WASHOUT

## 2023-12-19 RX ORDER — NIFEDIPINE 30 MG/1
30 TABLET, FILM COATED, EXTENDED RELEASE ORAL
Status: DISCONTINUED | OUTPATIENT
Start: 2023-12-19 | End: 2023-12-19 | Stop reason: HOSPADM

## 2023-12-19 RX ORDER — POLYETHYLENE GLYCOL 3350 17 G/17G
17 POWDER, FOR SOLUTION ORAL 2 TIMES DAILY PRN
Qty: 30 EACH | Refills: 1 | Status: SHIPPED | OUTPATIENT
Start: 2023-12-19 | End: 2024-02-02 | Stop reason: WASHOUT

## 2023-12-19 RX ADMIN — BISACODYL 10 MG: 10 SUPPOSITORY RECTAL at 09:51

## 2023-12-19 RX ADMIN — IBUPROFEN 600 MG: 600 TABLET, FILM COATED ORAL at 00:34

## 2023-12-19 RX ADMIN — IBUPROFEN 600 MG: 600 TABLET, FILM COATED ORAL at 06:46

## 2023-12-19 RX ADMIN — ACETAMINOPHEN 975 MG: 325 TABLET ORAL at 00:34

## 2023-12-19 RX ADMIN — LEVOTHYROXINE SODIUM 12.5 MCG: 25 TABLET ORAL at 04:13

## 2023-12-19 RX ADMIN — ACETAMINOPHEN 975 MG: 325 TABLET ORAL at 06:46

## 2023-12-19 RX ADMIN — POLYETHYLENE GLYCOL 3350 17 G: 17 POWDER, FOR SOLUTION ORAL at 08:07

## 2023-12-19 RX ADMIN — NIFEDIPINE 30 MG: 30 TABLET, FILM COATED, EXTENDED RELEASE ORAL at 09:55

## 2023-12-19 ASSESSMENT — PAIN SCALES - GENERAL
PAINLEVEL_OUTOF10: 0 - NO PAIN

## 2023-12-19 NOTE — CARE PLAN
The patient's goals for the shift include stop the mag    The clinical goals for the shift include no s/sx of mag toxicity (no s/sx of mag toxicity)    Problem: Postpartum  Goal: Experiences normal postpartum course  Outcome: Met

## 2023-12-19 NOTE — DISCHARGE SUMMARY
"Discharge Summary    Admission Date: 12/15/2023  Discharge Date: 12/19/23      Discharge Diagnosis  Pre-eclampsia, severe    Hospital Course  Delivery Date: 12/16/2023  9:48 PM   Delivery type: Vaginal, Spontaneous    GA at delivery: 38w2d  Outcome: Living   Anesthesia during delivery: Epidural   Intrapartum complications: Uterine Atony;Hemorrhage   Feeding method: Breastfeeding Status: No     Procedures: none  Contraception at discharge: none    Pt. C/o constipation, encouraged supp. Use, also rx. For mirilax for home.  Discussed persistent high mild bp's, wants to start meds. Today and agrees for later today discharge.  Denies any s/sx sPEC, reviewed.  Has bp cuff.    Hgb. 11.5  Cr. 1.58-1.54-1.27-1.31-1.09 this a.m.  Pltls 70--121-164 this a.m.    Revisited prior to discharge, Very good results from rectal suppository.  Pt. Also expresses feeling \"so much better, feel great\" after starting nifedipine.    Pertinent Physical Exam At Time of Discharge    General: Examination reveals a well developed, well nourished, female, in no acute distress. She is alert and cooperative.    Discharge Meds     Your medication list        START taking these medications        Instructions Last Dose Given Next Dose Due   acetaminophen 325 mg tablet  Commonly known as: Tylenol      Take 3 tablets (975 mg) by mouth every 6 hours.       ibuprofen 600 mg tablet      Take 1 tablet (600 mg) by mouth every 6 hours.       NIFEdipine ER 30 mg 24 hr tablet  Commonly known as: Adalat CC  Start taking on: December 20, 2023      Take 1 tablet (30 mg) by mouth once daily in the morning. Take before meals. Do not crush, chew, or split. Do not start before December 20, 2023.       polyethylene glycol 17 gram packet  Commonly known as: Glycolax, Miralax      Take 17 g by mouth 2 times a day as needed (first line).       psyllium 3.4 gram packet  Commonly known as: Metamucil      Take 1 packet by mouth once daily as needed (any " constipation).              CONTINUE taking these medications        Instructions Last Dose Given Next Dose Due   alcohol swabs pads, medicated      Apply 1 each topically 4 times a day.       FreeStyle glucose monitoring kit  Commonly known as: OneTouch Verio Flex Start      1 each 4 times a day. Test before breakfast (fasting) and 1 hour after each meal       lancets misc  Commonly known as: OneTouch UltraSoft Lancets      1 each 4 times a day. Test before breakfast (fasting) and 1 hour after each meal       levothyroxine 25 mcg tablet  Commonly known as: Synthroid, Levoxyl           Vitafol-One 29 mg iron- 1 mg-200 mg capsule  Generic drug: prenatal 26-iron ps-folic-dha                  STOP taking these medications      aspirin 81 mg EC tablet                  Where to Get Your Medications        These medications were sent to [id number] - [place], [place] - [address] [place] [address] AT [facility]  [address] [place] [address], [place] [id number]      Phone: [phone]   acetaminophen 325 mg tablet  ibuprofen 600 mg tablet  NIFEdipine ER 30 mg 24 hr tablet  polyethylene glycol 17 gram packet  psyllium 3.4 gram packet          Complications Requiring Follow-Up  Heavy vaginal bleeding, passing clots, fever and/or chills      Test Results Pending At Discharge  Pending Labs       Order Current Status    Path Review-Immunohematology In process    Surgical Pathology Exam - PLACENTA In process            Outpatient Follow-Up  Future Appointments   Date Time Provider Department Center   12/27/2023 11:00 AM [id number] NURSE [id number]       I spent 8 minutes in the professional and overall care of this patient.      Gerda Farris, KENNETH-CNP

## 2023-12-20 ENCOUNTER — APPOINTMENT (OUTPATIENT)
Dept: INTEGRATIVE MEDICINE | Facility: CLINIC | Age: 33
End: 2023-12-20
Payer: COMMERCIAL

## 2023-12-20 LAB
LABORATORY COMMENT REPORT: NORMAL
PATH REPORT.FINAL DX SPEC: NORMAL
PATH REPORT.GROSS SPEC: NORMAL
PATH REPORT.RELEVANT HX SPEC: NORMAL
PATH REPORT.TOTAL CANCER: NORMAL

## 2023-12-20 NOTE — SIGNIFICANT EVENT
Follow-up Phone Call Note:   Interview:  Care Type: Women's Health   Phone Number Call  [phone]   Call Outcome: Left Message       Date/Time Of Call: 12/20/23 at 1125   Call Back Done By: care coordinator   Callback Complete:  Yes

## 2023-12-22 ENCOUNTER — APPOINTMENT (OUTPATIENT)
Dept: OBSTETRICS AND GYNECOLOGY | Facility: CLINIC | Age: 33
End: 2023-12-22
Payer: COMMERCIAL

## 2023-12-22 ENCOUNTER — APPOINTMENT (OUTPATIENT)
Dept: MATERNAL FETAL MEDICINE | Facility: CLINIC | Age: 33
End: 2023-12-22
Payer: COMMERCIAL

## 2023-12-27 ENCOUNTER — CLINICAL SUPPORT (OUTPATIENT)
Dept: OBSTETRICS AND GYNECOLOGY | Facility: CLINIC | Age: 33
End: 2023-12-27
Payer: COMMERCIAL

## 2023-12-27 VITALS — SYSTOLIC BLOOD PRESSURE: 127 MMHG | DIASTOLIC BLOOD PRESSURE: 90 MMHG

## 2023-12-27 NOTE — PROGRESS NOTES
Patient here for BP check.  Patient s/p  on 2023, complicated by PPH and sPEC.  Patient was discharged home on Nifedipine 30 mg XL once a day.  Home BP log reviewed, Bps 110s-120s/80s. Denies PEC s/sx.  Patient states bleeding is minimal.  Patient is bottle feeding, denies any issues with engorgement.  Patient aware to keep taking blood pressure medication and keep BP log.  Patient advised to call back if blood pressures become low, 100s/60s or has any other concerning symptoms. Patient has PPV scheduled for 24.

## 2023-12-29 DIAGNOSIS — E03.9 HYPOTHYROIDISM, UNSPECIFIED TYPE: Primary | ICD-10-CM

## 2023-12-29 RX ORDER — LEVOTHYROXINE SODIUM 25 UG/1
25 TABLET ORAL DAILY
Qty: 30 TABLET | Refills: 2 | OUTPATIENT
Start: 2023-12-29 | End: 2024-03-28

## 2024-01-02 ENCOUNTER — TELEPHONE (OUTPATIENT)
Dept: MATERNAL FETAL MEDICINE | Facility: CLINIC | Age: 34
End: 2024-01-02
Payer: COMMERCIAL

## 2024-01-02 RX ORDER — LEVOTHYROXINE SODIUM 25 UG/1
25 TABLET ORAL DAILY
Qty: 30 TABLET | Refills: 2 | Status: SHIPPED | OUTPATIENT
Start: 2024-01-02 | End: 2024-02-02 | Stop reason: WASHOUT

## 2024-01-02 NOTE — DOCUMENTATION CLARIFICATION NOTE
"    PATIENT:               FRANK EISENBERG  ACCT #:                  [id number]  MRN:                       [id number]  :                       1990  ADMIT DATE:       12/15/2023 11:33 AM  DISCH DATE:        2023 4:45 PM  RESPONDING PROVIDER #:        [id number]          PROVIDER RESPONSE TEXT:    intraamniotic infection ruled in for this admission    CDI QUERY TEXT:    [facility]_Diagnosis Ruled Out In    Instruction: Based on your assessment of the patient and the clinical information, please provide the requested documentation by clicking on the appropriate radio button and enter any additional information if prompted.    Question: Please further clarify the presumed diagnosis of IAI/endometritis as    When answering this query, please exercise your independent professional judgment. The fact that a question is being asked, does not imply that any particular answer is desired or expected.    The patient's clinical indicators include:  Clinical Information: On admit patient was a 32yo  at 38.1wga by 11.1wga US admitted for IOL in setting of new gHTN and known GDMA1; patient delivered vaginally with noted complications of uterine atony and hemorrhage. In delivery note the diagnosis of \"presumed IAI/endometritis\" was documented and requires additional clarification.    Clinical Indicators:  -- L and D note: \"Shortly after delivery, patient had fever to 38.8C. given fetal tachycardia to 170s during second stage, presumed IAI/endometritis\" (VS: 38.8, 114, 18, 142/100, 99 on RA)  -- pathology: \"Suspected intra amniotic infection\", \"No gross abnormalities are noted\"  FINAL DIAGNOSIS  A. PLACENTA:  --FRAGMENTED (?INCOMPLETE) MATURE PLACENTA, 362 G IN AGGREGATE  --VILLOUS INFARCT    Treatment: amp/gent given x1; VS/labs monitored    Risk Factors: vaginal delivery with manual placenta removal  Options provided:  -- intraamniotic infection and endometritis ruled out for this admission  -- intraamniotic " infection ruled in for this admission  -- acute endometritis ruled in for this admission  -- chronic endometritis ruled in for this admission  -- unspecified endometritis ruled in for this admission  -- Other - I will add my own diagnosis  -- Refer to Clinical Documentation Reviewer    Query created by: Bridgett Jolly on 12/29/2023 12:18 PM      Electronically signed by:  ELOY HEWITT MD 1/2/2024 11:34 AM

## 2024-01-02 NOTE — TELEPHONE ENCOUNTER
Patient messaged regarding blood pressures. Multiple Bps 100s-110s/60s-70s.  Discussed with Dr. Rojas, will have patient stop Nifedipine 30 mg and keep checking blood pressures.  Patient advised to call back if Bps greater than 140s/90s or any other concerning symptoms.

## 2024-01-19 ENCOUNTER — APPOINTMENT (OUTPATIENT)
Dept: MATERNAL FETAL MEDICINE | Facility: CLINIC | Age: 34
End: 2024-01-19
Payer: COMMERCIAL

## 2024-01-26 ENCOUNTER — APPOINTMENT (OUTPATIENT)
Dept: MATERNAL FETAL MEDICINE | Facility: CLINIC | Age: 34
End: 2024-01-26
Payer: COMMERCIAL

## 2024-02-01 LAB — PATH REV-IMMUNOHEMATOLOGY-PR30: NORMAL

## 2024-02-02 ENCOUNTER — OFFICE VISIT (OUTPATIENT)
Dept: MATERNAL FETAL MEDICINE | Facility: CLINIC | Age: 34
End: 2024-02-02
Payer: COMMERCIAL

## 2024-02-02 ENCOUNTER — APPOINTMENT (OUTPATIENT)
Dept: MATERNAL FETAL MEDICINE | Facility: CLINIC | Age: 34
End: 2024-02-02
Payer: COMMERCIAL

## 2024-02-02 VITALS
HEIGHT: 61 IN | BODY MASS INDEX: 22.66 KG/M2 | SYSTOLIC BLOOD PRESSURE: 115 MMHG | HEART RATE: 78 BPM | WEIGHT: 120 LBS | DIASTOLIC BLOOD PRESSURE: 76 MMHG

## 2024-02-02 DIAGNOSIS — Z86.32 HISTORY OF GESTATIONAL DIABETES: Primary | ICD-10-CM

## 2024-02-02 DIAGNOSIS — E03.8 OTHER SPECIFIED HYPOTHYROIDISM: ICD-10-CM

## 2024-02-02 DIAGNOSIS — Z87.59 HISTORY OF PRE-ECLAMPSIA: ICD-10-CM

## 2024-02-02 PROCEDURE — 3074F SYST BP LT 130 MM HG: CPT | Performed by: NURSE PRACTITIONER

## 2024-02-02 PROCEDURE — 3078F DIAST BP <80 MM HG: CPT | Performed by: NURSE PRACTITIONER

## 2024-02-02 PROCEDURE — 1036F TOBACCO NON-USER: CPT | Performed by: NURSE PRACTITIONER

## 2024-02-02 PROCEDURE — 99213 OFFICE O/P EST LOW 20 MIN: CPT | Performed by: NURSE PRACTITIONER

## 2024-02-02 ASSESSMENT — EDINBURGH POSTNATAL DEPRESSION SCALE (EPDS)
I HAVE BEEN SO UNHAPPY THAT I HAVE BEEN CRYING: NO, NEVER
THINGS HAVE BEEN GETTING ON TOP OF ME: NO, MOST OF THE TIME I HAVE COPED QUITE WELL
I HAVE BEEN SO UNHAPPY THAT I HAVE HAD DIFFICULTY SLEEPING: NOT AT ALL
THE THOUGHT OF HARMING MYSELF HAS OCCURRED TO ME: NEVER
TOTAL SCORE: 2
I HAVE BEEN ABLE TO LAUGH AND SEE THE FUNNY SIDE OF THINGS: AS MUCH AS I ALWAYS COULD
I HAVE LOOKED FORWARD WITH ENJOYMENT TO THINGS: AS MUCH AS I EVER DID
I HAVE BEEN ANXIOUS OR WORRIED FOR NO GOOD REASON: NO, NOT AT ALL
I HAVE FELT SAD OR MISERABLE: NO, NOT AT ALL
I HAVE BLAMED MYSELF UNNECESSARILY WHEN THINGS WENT WRONG: NOT VERY OFTEN
I HAVE FELT SCARED OR PANICKY FOR NO GOOD REASON: NO, NOT AT ALL

## 2024-02-02 ASSESSMENT — PAIN SCALES - GENERAL: PAINLEVEL: 0-NO PAIN

## 2024-02-02 NOTE — PROGRESS NOTES
"Elidia Lindsay is a 33 y.o.  presenting for Postpartum F/U.   Pt delivered  on 2023 @ 38w2d   Pregnancy complicated by:  RPL, cervical cerclage, h/o gestational diabetes, severe pre-eclampsia PP, RH negative - received rhogam following delivery (baby O+)    She is without complaints today.     Initially sent home on BP medication. No longer taking. BP stable.    Breast/bottle feeding -formula feeding, milk did not come in   Mood stable   Normal voiding and bowel movements  Bleeding -stopped  Delivery complications - pp spec   Contraception plans- declines , IVF pregnancy- does not feel contraception is needed   Last PAP - 2023- Dr. Chu ([facility])-  no h/o abnormal pap        ROS is negative.   Visit Vitals  /76   Pulse 78   Ht 1.549 m (5' 1\")   Wt 54.4 kg (120 lb)   LMP 2023   Breastfeeding No   BMI 22.67 kg/m²   OB Status Recent pregnancy   Smoking Status Never   BSA 1.53 m²      Gen-NAD  Cardiac- good peripheral perfusion  Respiratory- non-labored breathing  Abdomen- soft, non-tender  Extremities- symmetrical   Pelvic- declined        Problem List Items Addressed This Visit       Postpartum care following vaginal delivery    Current Assessment & Plan     PP care   Feeding- formula, no concerns for breasts  Mood- stable   Contraception- declines, counseled on risk of close interval pregnancy and risk of conception on her own following IVF pregnancy. Pt verbalized understanding   Well-woman care- last PAP 2023- [facility], reports no h/o abnormal PAP   Bleeding- stopped  Placental path-reviewed   L&D note reviewed-L sulcal tear with repair, uterine atony that resulted in Thais placement (500cc EBL), fever and tachycardia with suspected IAI/Endometritis- s/p Amp/Gent; Cr uptrended, but declined to normal prior to hospital discharge--> declined pelvic exam               Hypothyroidism    Overview     Subclinical   Recent TSH  - 2.09  Repeat level, if normal can discontinue synthroid (ordered " 2/2)          Relevant Orders    TSH with reflex to Free T4 if abnormal    History of pre-eclampsia    Overview     -Plan for bASA and b/l PEC labs early  in next pregnancy              Current Assessment & Plan     Reviewed at PP visit 2/2:  -Received 24hr mag, discharged home on nifedipine   -self-dc for decreased BP and symptoms, BP stable off meds            History of gestational diabetes - Primary    Overview     -2hr gtt ordered   -PCP F/U for TSH and A1C q3 yrs          Relevant Orders    Glucose Trell, 2Hr Non-Pregnancy      RTC PRN    To see MFM in future pregnancy     Ana Guevara, APRN-CNP

## 2024-02-02 NOTE — ASSESSMENT & PLAN NOTE
PP care   Feeding- formula, no concerns for breasts  Mood- stable   Contraception- declines, counseled on risk of close interval pregnancy and risk of conception on her own following IVF pregnancy. Pt verbalized understanding   Well-woman care- last PAP 1/2023- [facility], reports no h/o abnormal PAP   Bleeding- stopped  Placental path-reviewed   L&D note reviewed-L sulcal tear with repair, uterine atony that resulted in Thais placement (500cc EBL), fever and tachycardia with suspected IAI/Endometritis- s/p Amp/Gent; Cr uptrended, but declined to normal prior to hospital discharge--> declined pelvic exam

## 2024-02-02 NOTE — ASSESSMENT & PLAN NOTE
Reviewed at PP visit 2/2:  -Received 24hr mag, discharged home on nifedipine   -self-dc for decreased BP and symptoms, BP stable off meds

## 2024-05-06 ENCOUNTER — TELEMEDICINE (OUTPATIENT)
Dept: ENDOCRINOLOGY | Facility: CLINIC | Age: 34
End: 2024-05-06
Payer: COMMERCIAL

## 2024-05-06 VITALS — BODY MASS INDEX: 21.14 KG/M2 | HEIGHT: 61 IN | WEIGHT: 112 LBS

## 2024-05-06 DIAGNOSIS — Z01.812 ENCOUNTER FOR PREPROCEDURAL LABORATORY EXAMINATION: ICD-10-CM

## 2024-05-06 DIAGNOSIS — N96 RECURRENT PREGNANCY LOSS WITHOUT CURRENT PREGNANCY: ICD-10-CM

## 2024-05-06 DIAGNOSIS — Z11.59 ENCOUNTER FOR SCREENING FOR OTHER VIRAL DISEASES: ICD-10-CM

## 2024-05-06 DIAGNOSIS — Z31.41 FERTILITY TESTING: ICD-10-CM

## 2024-05-06 DIAGNOSIS — Z11.3 SCREENING FOR STDS (SEXUALLY TRANSMITTED DISEASES): ICD-10-CM

## 2024-05-06 DIAGNOSIS — Z31.89 ENCOUNTER FOR FERTILITY PLANNING: Primary | ICD-10-CM

## 2024-05-06 DIAGNOSIS — Z86.32 H/O GESTATIONAL DIABETES MELLITUS, NOT CURRENTLY PREGNANT: ICD-10-CM

## 2024-05-06 DIAGNOSIS — Z01.83 ENCOUNTER FOR RH BLOOD TYPING: ICD-10-CM

## 2024-05-06 PROCEDURE — 99214 OFFICE O/P EST MOD 30 MIN: CPT

## 2024-05-06 PROCEDURE — 1036F TOBACCO NON-USER: CPT

## 2024-05-06 RX ORDER — LEVOTHYROXINE SODIUM 25 UG/1
12.5 TABLET ORAL
COMMUNITY

## 2024-05-06 ASSESSMENT — PAIN SCALES - GENERAL: PAINLEVEL: 0-NO PAIN

## 2024-05-06 ASSESSMENT — PATIENT HEALTH QUESTIONNAIRE - PHQ9
2. FEELING DOWN, DEPRESSED OR HOPELESS: NOT AT ALL
SUM OF ALL RESPONSES TO PHQ9 QUESTIONS 1 AND 2: 0
1. LITTLE INTEREST OR PLEASURE IN DOING THINGS: NOT AT ALL

## 2024-05-06 NOTE — PROGRESS NOTES
Virtual or Telephone Consent: An interactive audio and video telecommunication system which permits real time communications between the patient (at the originating site) and provider (at the distant site) was utilized to provide this telehealth service    Follow Up Visit HPI    Patient is a 33 y.o.  female presenting today for follow up visit to discuss future FET, delivered  on 2023 @ 38w2d from FET 2023 with 1 blast, with h/o RPL x 4, cervical cerclage, h/o gestational diabetes, severe pre-eclampsia PP, RH negative - received rhogam following delivery (baby O+). Has 3 blast embryos remaining. Desires  FET. Is bottle feeding.    -L sulcal tear with repair, uterine atony that resulted in Thais placement (500cc EBL), fever and tachycardia with suspected IAI/Endometritis- s/p Amp/Gent; Cr uptrended, but declined to normal prior to hospital discharge     Treatment to date:     FET 2023 with 1 blast- protocol: Estrace 6 mg p.o./add in vaginal Estrace 2 mg BID PV at 1st lining check/ARTURO 75 mg IM/Prednisone 10 mg p.o. start with ARTURO, Baby  mg p.o. start day of FET: conceived and delivered 2023- ^ Bps & had 24 hr magnesium sulfate- -L sulcal tear with repair, uterine atony that resulted in Thais placement (500cc EBL), fever and tachycardia with suspected IAI/Endometritis- s/p Amp/Gent; Cr uptrended, but declined to normal prior to hospital discharge    FET 3-- unsuccessful with 1 blast  FET 2022: 2 day 3 embryos- conceived- D&E 10-- At 13 weeks she awoke with a high volume of fluid loss (thought it might have been urine), then was diagnosed with PPROM at about 16 weeks with ongoing leakage.    Hysteroscopy : robby ostia seen, signs of inflammation, Doxycycline 100 mg BID x 14 days given, normal hysteroscopy     IVF #1 - OCPs leading in and then 300 increased to 450 gonal F 75 menopur antagonist protocol- saw three follicles, 6 eggs retrieved, 3  fertilized and one made it to day 3, planned PGTA could not proceed  IVF #2 - Mini stim Clomid + omnitrope - 2 eggs, no fert, was told her eggs arrested but were mature  IVF #3 - High dose protocol - cancelled prior to egg retrieval  IVF#4- 3-7-2023: luteal estrace lead in MF with 300 FSH and 150 menopur and GH per protocol, no PGT-A: 12 eggs retrieved    Prior Labs  Lab Results    Date Done      AMH: 1.10 (Ref range: ng/mL) 5/20/2022   TSH: 2.07 (Ref range: 0.44 - 3.98 mIU/L) 11/17/2023   PRL: No results found for requested labs within last 1825 days. No results found for requested labs within last 1825 days.   Testosterone: No results found for requested labs within last 1825 days. No results found for requested labs within last 1825 days.   DHEAS: 412 (H; Ref range: 12 - 379 ug/dL) 1/30/2023   FSH: No results found for requested labs within last 1825 days. No results found for requested labs within last 1825 days.   17 OHP: No results found for requested labs within last 1825 days. No results found for requested labs within last 1825 days.   HgbA1c: 4.6 (Ref range: %) 5/22/2023   Hepatitis B surface antigen: NONREACTIVE (Ref range: NONREACTIVE) 5/22/2023   Hepatitis C antibody: NONREACTIVE (Ref range: NONREACTIVE) 5/22/2023   HIV ½ Antigen Antibody screen with reflex: NONREACTIVE (Ref range: NONREACTIVE) 5/22/2023   Syphilis screening with reflex: No results found for requested labs within last 1825 days. 12/15/2023   GC: NEGATIVE (Ref range: Negative) 5/22/2023   CT: NEGATIVE (Ref range: Negative) 5/22/2023   Type and Screen: O 12/15/2023   Rh: NEG 12/15/2023   Antibody: POS (Ref range: ) 12-   Rubella: POSITIVE (Ref range: ) 5/22/2023   Varicella: No results found for requested labs within last 1825 days. No results found for requested labs within last 1825 days.   Hemoglobin: No results found for requested labs within last 1825 days. No results found for requested labs within last 1825 days.    Hematocrit: No results found for requested labs within last 1825 days. No results found for requested labs within last 1825 days.   Creatinine: 1.09 (H; Ref range: 0.50 - 1.05 mg/dL) 12/19/2023   AST:32 (Ref range: 9 - 39 U/L) 12/19/2023   ALT:17 (Ref range: 7 - 45 U/L): 12/19/2023     Hysterosalpingogram: 3-9-2021: normal  Saline Infused Sonography: 7-- normal  GYN Pelvic Ultrasound: none    OB Hx: (year, GA, mode, complications, name)  12/16/2023 @ 38w2d from FET 4- with 1 blast, h/o gestational diabetes, severe pre-eclampsia PP, RH negative - received rhogam following delivery (baby O+). Has 3 blast embryos remaining. Desires 9-2024 FET. Is bottle feeding.  D&E 10-- At 13 wks- awoke with a high volume of fluid loss (thought it might have been urine), then was diagnosed with PPROM at about 16 weeks with ongoing leakage.    April 2012 age 21 AB at 7 weeks  Dec 2019 SAB at 6 weeks   May 2020 chem preg     GYN HISTORY:  History of STI or PID: No  Last pap smear: 1/21  History of abnormal paps:yes patient unsure possible HPV but had the vaccine in college  Mammogram: none  Coitus: NA  Pelvic pain:no  Pain with intercourse, bowel movements or full bladder: no     MENSTRUAL HISTORY:   LMP: 4-  Cycle length: 29-35 days  Bleeding length: 3-5 days  Heavy bleeding: no  Dysmenorrhea: no     ENDOCRINE/INFERTILITY HISTORY:  Duration of infertility: since 2020   Coital Activity/week:1  Nipple Discharge: no  Vision changes / headaches: no  Excess hair growth: no  Acne/oily skin: no  Recent weight change: no  Exercise: no    PMH: sub clinical hypothyroid     MEDICATION: Synthroid, PNV     PSH: Rhinoplasty in 2016     PSYCH HISTORY: None      Past Medical History:   Diagnosis Date    Acne, unspecified     Mild acne    Anxiety and depression     Delacruz cerclage present ([facility])     Personal history of other diseases of the nervous system and sense organs 01/05/2022    History of hearing loss     "Postural orthostatic tachycardia syndrome (POTS) 2017    POTS (postural orthostatic tachycardia syndrome)    Preeclampsia ([facility])     Recurrent pregnancy loss     Subclinical hypothyroidism     Vitamin D deficiency      Past Surgical History:   Procedure Laterality Date    MOUTH SURGERY  2016    Oral Surgery Tooth Extraction    OTHER SURGICAL HISTORY  2022    Dilation and evacuation    OTHER SURGICAL HISTORY  2022    Rhinoplasty     Current Outpatient Medications on File Prior to Visit   Medication Sig Dispense Refill    levothyroxine (Synthroid, Levoxyl) 25 mcg tablet Take 0.5 tablets (12.5 mcg) by mouth once daily in the morning. Take before meals.      PRENATAL 2-IRON-FOLIC ACID-OM3 ORAL Take by mouth.      lancets (OneTouch UltraSoft Lancets) misc 1 each 4 times a day. Test before breakfast (fasting) and 1 hour after each meal (Patient not taking: Reported on 2024) 120 each 3     No current facility-administered medications on file prior to visit.       BMI:   BMI Readings from Last 1 Encounters:   24 21.16 kg/m²     VITALS:  Ht 1.549 m (5' 1\")   Wt 50.8 kg (112 lb)   LMP 2024 (Approximate)   BMI 21.16 kg/m²     LMP: Patient's last menstrual period was 2024 (approximate).    ASSESSMENT   33 y.o.  female with h/o infertility since , and the following pertinent medical issues: delivery 2023 @ 38w2d from FET 2023 with 1 blast,  h/o RPL x 4, cervical cerclage, h/o gestational diabetes, severe pre-eclampsia PP with magnesium sulfate with L sulcal tear with repair, uterine atony that resulted in Thais placement (500cc EBL), fever and tachycardia with suspected IAI/Endometritis- s/p Amp/Gent; Cr uptrended, but declined to normal prior to hospital discharge.     Routine Testing  Fertility Center  STDs Within 1 year   Genetic carrier Waiver/Completed   T&S Within 1 year   AMH Within 1 year   TSH Within 1 year   Rubella/Varicella Within 5 years "     BMI Testing  Fertility Center  CBC Within 1 year   CMP Within 1 year   HgbA1c Within 1 year   Mag, Phos, Vit D <18 Within 1 year   MFM > 40  REQ   Wt loss consult > 40 OPT     PLAN  STDs  Varicella  TSH with pregnancy  HgbA1C since she had diet controlled GDM with last pregnancy  Hysteroscopy closer to FET  See MFM in early pregnancy- will need baseline PEC labs in early pregnancy  Wait at least 6-12 months from last delivery- per Dr. Acuña 6 months is acceptable,   Fet protocol: Programmed FET- Estrace 6 mg p.o. + Estrace 2 mg BID PV start day 1 menses/ARTURO 75 mg IM/prednisone 10 mg p.o. start with ARTURO, & Baby  mg start with with FET  FET NP Teaching Performed  Reviewed plan for upcoming FET.   Reviewed lining check. Reviewed estrogen priming and progesterone.   Reviewed need for ultrasound monitoring. Reviewed cancelled cycles, sometimes multiple to achieve optimal lining.   Reviewed possibility that embryos might not survive thaw and need for signed thaw plan.   All questions answered.   Hysteroscopy for cavity evaluation  We have agree to transfer: 1 blast embryo  Update FET Treatment Plan  Update IVF Consents   Consults: has been seen and followed by MFM- to see in early pregnancy- pt does not need a preconceptual consult, but will be followed by MFM per Dr. Acuña  Engaged MD  Take prenatal vitamins, vitamin D 2000 IUs daily  Discussed that treatment cannot proceed until checklist items are complete.   Up to date on pap test/HPV negative 2-  Chart to primary nurse for care coordination and patient check list/education.    MD Completion:  Ectopic Risk: No  Medically Complex: Yes- h/o RPL x 4, & H/O FT  with cervical cerclage, h/o gestational diabetes, severe pre-eclampsia PP with magnesium sulfate with L sulcal tear with repair, uterine atony that resulted in Thais placement (500cc EBL), fever and tachycardia with suspected IAI/Endometritis- s/p Amp/Gent; Cr uptrended, but declined to  normal prior to hospital discharge.     Sally Ochoa, CNP 05/07/24 8:17 PM

## 2024-05-06 NOTE — Clinical Note
Balta Acuña, I saw Elidia today, h/o RPL x 4 and  38 week 2023- delivery 2023 @ 38w2d from FET 2023 with 1 blast,  h/o RPL x 4, cervical cerclage, h/o gestational diabetes, severe pre-eclampsia PP with magnesium sulfate with L sulcal tear with repair, uterine atony that resulted in Thais placement (500cc EBL), fever and tachycardia with suspected IAI/Endometritis- s/p Amp/Gent; Cr uptrended, but declined to normal prior to hospital discharge. She wants to do a FET in the Fall, , wanted your advice for how long she should wait and if you need to see her prior, Sally

## 2024-05-06 NOTE — Clinical Note
Balta Marshall, I saw Elidia today- I reached out to [facility] for their recommendation to see if she can do a FET this Fall or to wait a year- she had many complications- pre-eclampsia with PP MGSO4- GDM, uterine atony with JERONIMO placement- see my note, Sally :)

## 2024-06-01 NOTE — PROGRESS NOTES
[place] Interval FET Checklist    Age: 34 y.o.    Provider: MD Sally Greenberg, JUAN  Primary RN: TEAM  Reasons for Treatment: Recurrent Pregnancy Loss  Last BMI  24 : 21.41 kg/m²       Past Medical History:   Diagnosis Date    Acne, unspecified     Mild acne    Anxiety and depression     Delacruz cerclage present [facility])     Personal history of other diseases of the nervous system and sense organs 2022    History of hearing loss    Postural orthostatic tachycardia syndrome (POTS) 2017    POTS (postural orthostatic tachycardia syndrome)    Preeclampsia ([facility])     Recurrent pregnancy loss     Subclinical hypothyroidism     Vitamin D deficiency        Date Done Consultation Results/Comments   10/25/24 Medication Protocol Fertility Plan Update: Programmed FET- Estrace 6 mg p.o. + Estrace 2 mg BID PV start day 1 menses/ARTURO 75 mg IM  Adjuncts:  mg-start with with FET, prednisone 10 mg p.o. start with ARTURO,  Notes: severe PEC PP and gest diabetes; also h/o cercalge placement    10/25/24:    Fertility Plan Update:      Updated Protocol: Patch protocol, may add Estrace 2 mg BID PV as needed/ARTURO 75 mg IM   Adjuncts:  mg-start with with FET, prednisone 10 mg p.o. start with ARTURO    Geri Douglas 10/25/24 11:40 AM          24 FET Treatment Plan Packet- ID REQ (Every cycle) Received and in chart: Yes (Rolando Curry RN)   24 IVF Information and Authorization - [employer]/offsite Storage (ID REQ) (Yearly) Received and in chart: Yes (Rolando Curry RN)     24 [employer] Embryo- Couple or Single Packet (Yearly) Received and in chart: Yes (Rolando Curry RN)    24: Jennifer Castellanos, SEYMOUR Curry, KD; [facility] Lab: 3 embryos at [facility]!   24 [facility] Waiver (In) Form Received and in chart: Yes (Rolando Curry RN)   24 Embryo Ship In 1 Embryo ship in;    ID#   Embryo #1 [facility]      24 Procedure Order Placed [x]   24 MFM Consult- sally sent MFM  message 5. to check on when she can do FET due to preg complications Okay to proceed? Yes- Ok to proceed 6 months after delivery (del'd 23) per Dr. Acuña    History of pre-eclampsia      Overview       -Plan for bASA 163mg daily and b/l PEC labs early  in next pregnancy                  Current Assessment & Plan       Reviewed at PP visit :  -Received 24hr mag, discharged home on nifedipine   -self-dc for decreased BP and symptoms, BP stable off meds               History of gestational diabetes - Primary     Overview       -2hr gtt ordered   -PCP F/U for TSH and A1C q3 yrs            Relevant Orders     Glucose Trell, 2Hr Non-Pregnancy      RTC PRN     To see MFM in future pregnancy      Ana Guevara, APRN-CNP 24    Psych Consult Okay to proceed? N/A    Genetics Consult Okay to proceed? N/A    Other    Date Done Female Labs Results/Comments   6/3/2024 T&S (Q 1 Year) ABO: O  Rh: NEG  Antibody: NEG     6/3/2024 Hep B sAg Nonreactive   6/3/2024 Hep C AB Nonreactive   6/3/2024 HIV Nonreactive   6/3/2024 Syphilis Nonreactive   6/3/2024 GC/CT GC: Negative  CT: Negative   2023 Rubella (Q 5 Years) POSITIVE   6/3/2024 Varicella (Q 5 Years) Positive   2023 TSH 2.07 (Ref range: 0.44 - 3.98 mIU/L)   6/3/2024 HgbA1C 4.5 (Ref range: see below %)   24 Uterine Cavity Eval     Hyster: Findings:   Cavity: Smooth cavity no lesions noted  Ostia: Bilateral tubal ostia visualized   2.10.22 Pap Smear at [facility] in Media tab NILM  HPV: neg   N/A Mammogram ( > 40)              Date Done Miscellaneous Results/Comments    BMI Checklist  BMI > 40 or < 18 Added to chart:   No    >= 45 Checklist  Added to chart:   No   **Does not need to be completed prior to placing on IVF calendar**    MD Completion:  Ectopic Risk: No  Medically Complex: Yes- h/o RPL x 4, & H/O FT  with cervical cerclage, h/o gestational diabetes, severe pre-eclampsia PP with magnesium sulfate with L sulcal tear with repair, uterine atony that  resulted in Thais placement (500cc EBL), fever and tachycardia with suspected IAI/Endometritis- s/p Amp/Gent; Cr uptrended, but declined to normal prior to hospital discharge.   Geri Douglas 09/18/24 9:55 AM    Ok to proceed with FET #5.   Geri Douglas 10/29/24 4:09 PM

## 2024-06-03 ENCOUNTER — LAB (OUTPATIENT)
Dept: LAB | Facility: LAB | Age: 34
End: 2024-06-03
Payer: COMMERCIAL

## 2024-06-03 DIAGNOSIS — Z01.83 ENCOUNTER FOR RH BLOOD TYPING: ICD-10-CM

## 2024-06-03 DIAGNOSIS — Z11.59 ENCOUNTER FOR SCREENING FOR OTHER VIRAL DISEASES: ICD-10-CM

## 2024-06-03 DIAGNOSIS — Z11.3 SCREENING FOR STDS (SEXUALLY TRANSMITTED DISEASES): ICD-10-CM

## 2024-06-03 DIAGNOSIS — Z86.32 H/O GESTATIONAL DIABETES MELLITUS, NOT CURRENTLY PREGNANT: ICD-10-CM

## 2024-06-03 LAB
ABO GROUP (TYPE) IN BLOOD: NORMAL
ANTIBODY SCREEN: NORMAL
C TRACH RRNA SPEC QL NAA+PROBE: NEGATIVE
EST. AVERAGE GLUCOSE BLD GHB EST-MCNC: 82 MG/DL
HBA1C MFR BLD: 4.5 %
HBV SURFACE AG SERPL QL IA: NONREACTIVE
HCV AB SER QL: NONREACTIVE
HIV 1+2 AB+HIV1 P24 AG SERPL QL IA: NONREACTIVE
N GONORRHOEA DNA SPEC QL PROBE+SIG AMP: NEGATIVE
RH FACTOR (ANTIGEN D): NORMAL
TREPONEMA PALLIDUM IGG+IGM AB [PRESENCE] IN SERUM OR PLASMA BY IMMUNOASSAY: NONREACTIVE
VARICELLA ZOSTER IGG INDEX: 2.3 IA
VZV IGG SER QL IA: POSITIVE

## 2024-06-03 PROCEDURE — 86900 BLOOD TYPING SEROLOGIC ABO: CPT

## 2024-06-03 PROCEDURE — 86787 VARICELLA-ZOSTER ANTIBODY: CPT

## 2024-06-03 PROCEDURE — 83036 HEMOGLOBIN GLYCOSYLATED A1C: CPT

## 2024-06-03 PROCEDURE — 86780 TREPONEMA PALLIDUM: CPT

## 2024-06-03 PROCEDURE — 86850 RBC ANTIBODY SCREEN: CPT

## 2024-06-03 PROCEDURE — 87491 CHLMYD TRACH DNA AMP PROBE: CPT

## 2024-06-03 PROCEDURE — 36415 COLL VENOUS BLD VENIPUNCTURE: CPT

## 2024-06-03 PROCEDURE — 86901 BLOOD TYPING SEROLOGIC RH(D): CPT

## 2024-06-03 PROCEDURE — 87591 N.GONORRHOEAE DNA AMP PROB: CPT

## 2024-06-03 PROCEDURE — 87389 HIV-1 AG W/HIV-1&-2 AB AG IA: CPT

## 2024-06-03 PROCEDURE — 86803 HEPATITIS C AB TEST: CPT

## 2024-06-03 PROCEDURE — 87340 HEPATITIS B SURFACE AG IA: CPT

## 2024-06-28 ENCOUNTER — APPOINTMENT (OUTPATIENT)
Dept: INTEGRATIVE MEDICINE | Facility: CLINIC | Age: 34
End: 2024-06-28
Payer: COMMERCIAL

## 2024-06-28 DIAGNOSIS — N97.9 FEMALE INFERTILITY: Primary | ICD-10-CM

## 2024-06-28 PROCEDURE — 97810 ACUP 1/> WO ESTIM 1ST 15 MIN: CPT | Performed by: ACUPUNCTURIST

## 2024-06-28 PROCEDURE — 97811 ACUP 1/> W/O ESTIM EA ADD 15: CPT | Performed by: ACUPUNCTURIST

## 2024-06-28 NOTE — PROGRESS NOTES
Acupuncture Visit:     Subjective   Patient ID: Elidia Lindsay is a 34 y.o. female who presents for Fertility Support    Fertility Support:  She is 6 months postpartum and is planning for a transfer in September or October. Physically she is feeling well and has been gaining weight back after getting thin postpartum.  She has been working hard on her diet. She had two very long cycles - about 50 days.  She is about CD 23 today and feels like she is going to ovulate soon.  She is getting more pain with ovulation and cervical mucus.  No pain with periods.  PMS: more emotional around periods.  She needs to do a hysteroscopy soon.         Session Information  Is this acupuncture treatment being billed to the patient's insurance company: No  Visit Type: Follow-up visit  Medical History Reviewed: I have reviewed pertinent medical history in EHR, and no contraindications are present to provide treatment         Review of Systems  Sleep: she is struggling with sleep due to congestion and pregnancy.   Digestion: BM daily.  Well formed and easy to pass.  Some bloating.   Stress: manageable           Provider reviewed plan for the acupuncture session, precautions and contraindications. Patient/guardian/hospital staff has given consent to treat with full understanding of what to expect during the session. Before acupuncture began, provider explained to the patient to communicate at any time if the procedure was causing discomfort past their tolerance level. Patient agreed to advise acupuncturist. The acupuncturist counseled the patient on the risks of acupuncture treatment including pain, infection, bleeding, and no relief of pain. The patient was positioned comfortably. There was no evidence of infection at the site of needle insertions.    Objective   Physical Exam              Acupuncture Treatment  Patient Position: Supine on a table  Acupuncture Needling: Yes  Needle Guage: 36 guage /.20/ Blue seirin  Body Points: With  retention  Body Points - Bilateral: Du 20, SJ 5, R 12, ST 25, R 6, zigong, SP 10, ST 36, SP 6, SOBEIDA 3  Auricular Points: No  Electroacupuncture Used: No  Other Techniques Utilized: Ear seeds, TDP Lamp  Ear Seeds: Stainless steel (shenmen)  TDP Lamp Descripton: feet and abdomen  Needle Count In: 17  Needle Count Out: 17  Needle Retention Time (min): 25 minutes  Total Face to Face Time (min): 25 minutes  Supplement(s) 1: Prenatal  Supplement(s) 2: Vitamin D3 2000IU per day  Supplement(s) 3: Vitamin E about 600mg/1000IU per day  Supplement(s) 4: CoQ10 200mg per day              Assessment/Plan   Diagnoses and all orders for this visit:  Female infertility

## 2024-07-17 ENCOUNTER — TELEPHONE (OUTPATIENT)
Dept: ENDOCRINOLOGY | Facility: CLINIC | Age: 34
End: 2024-07-17
Payer: COMMERCIAL

## 2024-07-17 NOTE — TELEPHONE ENCOUNTER
Reason for call: Call Back  Notes: Pt would like to know if she is able to do her progesterone shots in the evenings or if they have to be done in the morning. Pt stated a call or MyChart message is fine.    Pt had emailed me earlier and I had responded via email: she has to do ARTURO in the morning initially, then if pregnant, we can discuss moving to evening dosing.    Rolando Curry 07/17/24 1:46 PM

## 2024-07-22 ENCOUNTER — HOSPITAL ENCOUNTER (OUTPATIENT)
Dept: ENDOCRINOLOGY | Facility: CLINIC | Age: 34
Discharge: HOME | End: 2024-07-22
Payer: COMMERCIAL

## 2024-07-22 ENCOUNTER — PREP FOR PROCEDURE (OUTPATIENT)
Dept: ENDOCRINOLOGY | Facility: CLINIC | Age: 34
End: 2024-07-22

## 2024-07-22 ENCOUNTER — APPOINTMENT (OUTPATIENT)
Dept: ENDOCRINOLOGY | Facility: CLINIC | Age: 34
End: 2024-07-22
Payer: COMMERCIAL

## 2024-07-22 VITALS
WEIGHT: 113.32 LBS | SYSTOLIC BLOOD PRESSURE: 106 MMHG | TEMPERATURE: 97.3 F | DIASTOLIC BLOOD PRESSURE: 73 MMHG | HEART RATE: 62 BPM | OXYGEN SATURATION: 100 % | HEIGHT: 61 IN | BODY MASS INDEX: 21.39 KG/M2 | RESPIRATION RATE: 17 BRPM

## 2024-07-22 DIAGNOSIS — Z31.41 FERTILITY TESTING: ICD-10-CM

## 2024-07-22 LAB — PREGNANCY TEST URINE, POC: NEGATIVE

## 2024-07-22 PROCEDURE — 7100000009 HC PHASE TWO TIME - INITIAL BASE CHARGE

## 2024-07-22 PROCEDURE — 64435 NJX AA&/STRD PARACRV NRV: CPT | Mod: GC | Performed by: OBSTETRICS & GYNECOLOGY

## 2024-07-22 PROCEDURE — 7100000010 HC PHASE TWO TIME - EACH INCREMENTAL 1 MINUTE

## 2024-07-22 PROCEDURE — 58555 HYSTEROSCOPY DX SEP PROC: CPT | Mod: GC | Performed by: OBSTETRICS & GYNECOLOGY

## 2024-07-22 PROCEDURE — 81025 URINE PREGNANCY TEST: CPT | Performed by: STUDENT IN AN ORGANIZED HEALTH CARE EDUCATION/TRAINING PROGRAM

## 2024-07-22 RX ORDER — ACETAMINOPHEN 325 MG/1
650 TABLET ORAL ONCE AS NEEDED
Status: CANCELLED | OUTPATIENT
Start: 2024-07-22

## 2024-07-22 RX ORDER — KETOROLAC TROMETHAMINE 30 MG/ML
30 INJECTION, SOLUTION INTRAMUSCULAR; INTRAVENOUS ONCE AS NEEDED
Status: CANCELLED | OUTPATIENT
Start: 2024-07-22 | End: 2024-07-27

## 2024-07-22 RX ORDER — KETOROLAC TROMETHAMINE 30 MG/ML
30 INJECTION, SOLUTION INTRAMUSCULAR; INTRAVENOUS ONCE AS NEEDED
Status: DISCONTINUED | OUTPATIENT
Start: 2024-07-22 | End: 2024-07-23 | Stop reason: HOSPADM

## 2024-07-22 RX ORDER — ACETAMINOPHEN 325 MG/1
650 TABLET ORAL ONCE AS NEEDED
Status: DISCONTINUED | OUTPATIENT
Start: 2024-07-22 | End: 2024-07-23 | Stop reason: HOSPADM

## 2024-07-22 NOTE — PROGRESS NOTES
Patient ID: Elidia Lindsay is a 34 y.o. female.    Hysteroscopy diagnostic    Date/Time: 7/22/2024 1:47 PM    Performed by: Cari Prescott MD  Authorized by: FLORENTINO Jean-Baptiste    Consent:     Consent obtained:  Verbal and written    Consent given by:  Patient    Risks, benefits, and alternatives were discussed: yes      Risks discussed:  Bleeding, infection and pain  Universal protocol:     Procedure explained and questions answered to patient or proxy's satisfaction: yes      Relevant documents present and verified: yes      Test results available: yes      Imaging studies available: yes      Required blood products, implants, devices, and special equipment available: yes      Immediately prior to procedure, a time out was called: yes      Patient identity confirmed:  Verbally with patient, arm band and hospital-assigned identification number  Pre-procedure details:     Skin preparation:  Povidone-iodine  Procedure specific details:      Procedure: Diagnostic Hysteroscopy   Preop diagnosis: fertility testing  Post op diagnosis: Same   Assistant: MD Kenyon    Anesthesia: None   IV: None   EBL: 3 cc  Specimens: None   Complications: None   Risks benefits and alternatives of the procedure explained to the patient and informed consent was obtained. Urine pregnancy test was performed and was negative. Time out was performed. The patient was placed in the dorsal lithotomy position and a sterile speculum was placed in the vagina. The cervix was sterilized with Betadine x3. Paracervical block with lidocaine 1% was administered.   Tenaculum: Yes  Dilation: No  The hysteroscope was placed in the cervix and advanced into the uterine cavity. Normal saline was used for distension media. Images were obtained and findings noted as below.   All instruments were then removed. Good hemostasis was noted. Patient tolerated the procedure well returned to the recovery area in stable condition.   Findings:   Cavity: Smooth cavity  no lesions noted  Ostia: Bilateral tubal ostia visualized  Additional Notes:    Cari Prescott 07/22/24 1:47 PM    Attending Attestation: I was physically present for key and critical portions performed by the fellow. I reviewed the fellow's documentation and discussed the patient with the fellow. I agree with the fellow's medical decision making as documented in the fellow's note.   Orlando Kirby MD  Reproductive Endocrinology and Infertility                     Post-procedure details:     Procedure completion:  Tolerated well, no immediate complications

## 2024-07-26 ENCOUNTER — APPOINTMENT (OUTPATIENT)
Dept: INTEGRATIVE MEDICINE | Facility: CLINIC | Age: 34
End: 2024-07-26
Payer: COMMERCIAL

## 2024-07-31 ENCOUNTER — TELEPHONE (OUTPATIENT)
Dept: ENDOCRINOLOGY | Facility: CLINIC | Age: 34
End: 2024-07-31
Payer: COMMERCIAL

## 2024-07-31 DIAGNOSIS — N97.9 FEMALE INFERTILITY: ICD-10-CM

## 2024-07-31 DIAGNOSIS — Z31.83 ENCOUNTER FOR ASSISTED REPRODUCTIVE FERTILITY CYCLE: ICD-10-CM

## 2024-07-31 RX ORDER — ESTRADIOL 2 MG/1
6 TABLET ORAL DAILY
Qty: 90 TABLET | Refills: 2 | Status: SHIPPED | OUTPATIENT
Start: 2024-07-31 | End: 2025-07-31

## 2024-07-31 RX ORDER — PROPYLENE GLYCOL/PEG 400 0.3 %-0.4%
DROPS OPHTHALMIC (EYE)
Qty: 30 EACH | Refills: 3 | Status: SHIPPED | OUTPATIENT
Start: 2024-07-31 | End: 2024-10-29

## 2024-07-31 RX ORDER — PREDNISONE 10 MG/1
10 TABLET ORAL DAILY
Qty: 30 TABLET | Refills: 2 | Status: SHIPPED | OUTPATIENT
Start: 2024-07-31 | End: 2025-07-31

## 2024-07-31 RX ORDER — ESTRADIOL 2 MG/1
2 TABLET ORAL 2 TIMES DAILY
Start: 2024-07-31 | End: 2025-07-31

## 2024-07-31 RX ORDER — LIDOCAINE AND PRILOCAINE 25; 25 MG/G; MG/G
CREAM TOPICAL DAILY
Qty: 30 G | Refills: 2 | Status: SHIPPED | OUTPATIENT
Start: 2024-07-31 | End: 2025-07-31

## 2024-07-31 RX ORDER — PROGESTERONE 50 MG/ML
75 INJECTION, SOLUTION INTRAMUSCULAR DAILY
Qty: 50 ML | Refills: 3 | Status: SHIPPED | OUTPATIENT
Start: 2024-07-31 | End: 2025-07-31

## 2024-07-31 NOTE — TELEPHONE ENCOUNTER
Reason for call: Patient is calling to talk to Rolando about her next steps for a Transfer.  Notes:

## 2024-07-31 NOTE — TELEPHONE ENCOUNTER
Pt expecting Sept period around 9/13. She would like to start FET cycle with that period.     Estrace PV and PO teaching done. Remaining checklist items:  Engaged MD forms  Will repeat TSH in August (last done 4/2024)    Pt verbalized understanding and is agreeable.  Rolando Curry 07/31/24 3:02 PM

## 2024-08-02 ENCOUNTER — SPECIALTY PHARMACY (OUTPATIENT)
Dept: PHARMACY | Facility: CLINIC | Age: 34
End: 2024-08-02

## 2024-08-09 ENCOUNTER — APPOINTMENT (OUTPATIENT)
Dept: INTEGRATIVE MEDICINE | Facility: CLINIC | Age: 34
End: 2024-08-09
Payer: COMMERCIAL

## 2024-08-09 DIAGNOSIS — N97.9 FEMALE INFERTILITY: Primary | ICD-10-CM

## 2024-08-09 PROCEDURE — 97811 ACUP 1/> W/O ESTIM EA ADD 15: CPT | Performed by: ACUPUNCTURIST

## 2024-08-09 PROCEDURE — 97810 ACUP 1/> WO ESTIM 1ST 15 MIN: CPT | Performed by: ACUPUNCTURIST

## 2024-08-09 NOTE — PROGRESS NOTES
Acupuncture Visit:     Subjective   Patient ID: Elidia Lindsay is a 34 y.o. female who presents for Fertility Support    Fertility Support:  She is planning for her transfer in October.  Her cycles are getting shorter and shorter compared to the 50-day cycles in early postpartum.  She ovulated at a more normal time this month.  LMP 7/13/24  Today is CD 28.  This cycle she thinks that she ovulated around CD 19.  She had no pain but she felt very tired and brown and bloated.  She did have egg white cervical mucus which she didn't get in the past. They did TTC with [facility] this cycle. Her last period was very heavy and painful.  She only bleeds for 3 days and then tapers off.         Session Information  Is this acupuncture treatment being billed to the patient's insurance company: No  Visit Type: Follow-up visit  Medical History Reviewed: I have reviewed pertinent medical history in EHR, and no contraindications are present to provide treatment         Review of Systems  Sleep: She is sleeping well even with her baby.  Digestion: BM daily.  Doing well.   Stress: manageable           Provider reviewed plan for the acupuncture session, precautions and contraindications. Patient/guardian/hospital staff has given consent to treat with full understanding of what to expect during the session. Before acupuncture began, provider explained to the patient to communicate at any time if the procedure was causing discomfort past their tolerance level. Patient agreed to advise acupuncturist. The acupuncturist counseled the patient on the risks of acupuncture treatment including pain, infection, bleeding, and no relief of pain. The patient was positioned comfortably. There was no evidence of infection at the site of needle insertions.    Objective   Physical Exam              Acupuncture Treatment  Patient Position: Supine on a table  Acupuncture Needling: Yes  Needle Guage: 36 guage /.20/ Blue seirin  Body Points: With retention  Body  Points - Bilateral: Du 20, P 6, R 12, ST 25, R 6, ST 36, KD 3, KD 7, SOBEIDA 3  Auricular Points: No  Electroacupuncture Used: No  Other Techniques Utilized: Ear seeds, TDP Lamp  Ear Seeds: Stainless steel (shenmen)  TDP Lamp Descripton: feet and abdomen  Needle Count In: 15  Needle Count Out: 15  Needle Retention Time (min): 25 minutes  Total Face to Face Time (min): 25 minutes              Assessment/Plan   Diagnoses and all orders for this visit:  Female infertility

## 2024-08-23 ENCOUNTER — APPOINTMENT (OUTPATIENT)
Dept: INTEGRATIVE MEDICINE | Facility: CLINIC | Age: 34
End: 2024-08-23
Payer: COMMERCIAL

## 2024-09-06 ENCOUNTER — APPOINTMENT (OUTPATIENT)
Dept: INTEGRATIVE MEDICINE | Facility: CLINIC | Age: 34
End: 2024-09-06
Payer: COMMERCIAL

## 2024-09-06 DIAGNOSIS — N97.9 FEMALE INFERTILITY: Primary | ICD-10-CM

## 2024-09-06 PROCEDURE — 97810 ACUP 1/> WO ESTIM 1ST 15 MIN: CPT | Performed by: ACUPUNCTURIST

## 2024-09-06 PROCEDURE — 97811 ACUP 1/> W/O ESTIM EA ADD 15: CPT | Performed by: ACUPUNCTURIST

## 2024-09-12 ENCOUNTER — APPOINTMENT (OUTPATIENT)
Dept: INTEGRATIVE MEDICINE | Facility: CLINIC | Age: 34
End: 2024-09-12
Payer: COMMERCIAL

## 2024-09-12 DIAGNOSIS — N97.9 FEMALE INFERTILITY: Primary | ICD-10-CM

## 2024-09-12 PROCEDURE — 97810 ACUP 1/> WO ESTIM 1ST 15 MIN: CPT | Performed by: ACUPUNCTURIST

## 2024-09-12 PROCEDURE — 97811 ACUP 1/> W/O ESTIM EA ADD 15: CPT | Performed by: ACUPUNCTURIST

## 2024-09-12 NOTE — PROGRESS NOTES
Acupuncture Visit:     Subjective   Patient ID: Elidia Lindsay is a 34 y.o. female who presents for Fertility Support    Fertility Support:   7/13/24  LMP 8/16/24  She is planning for a transfer in October.  She is expecting her period any day now.  Mild breast tenderness and some mood changes.     (L) Medial knee: she has been having pain at the left knee - medial inferior. No known injury.  No swelling, no bruise.     Supplements: This is Needed Prenatal  Medications: none        Session Information  Is this acupuncture treatment being billed to the patient's insurance company: No  Visit Type: Follow-up visit  Medical History Reviewed: I have reviewed pertinent medical history in EHR, and no contraindications are present to provide treatment         Review of Systems  Sleep: overall good sleep.   Digestion: BM good  Stress: stress last night otherwise stress is good.           Provider reviewed plan for the acupuncture session, precautions and contraindications. Patient/guardian/hospital staff has given consent to treat with full understanding of what to expect during the session. Before acupuncture began, provider explained to the patient to communicate at any time if the procedure was causing discomfort past their tolerance level. Patient agreed to advise acupuncturist. The acupuncturist counseled the patient on the risks of acupuncture treatment including pain, infection, bleeding, and no relief of pain. The patient was positioned comfortably. There was no evidence of infection at the site of needle insertions.    Objective   Physical Exam              Acupuncture Treatment  Patient Position: Supine on a table  Acupuncture Needling: Yes  Needle Guage: 36 guage /.20/ Blue seirin  Body Points: With retention  Body Points - Left: SP 9, SOBEIDA 8  Body Points - Bilateral: Du 20, LI 4, ST 25, R 6, zigong, ST 36, SP 6, SOBEIDA 3  Auricular Points: No  Electroacupuncture Used: No  Other Techniques Utilized: Ear seeds, TDP  Lamp  Ear Seeds: Stainless steel (shenmen)  TDP Lamp Descripton: feet and abdomen  Needle Count In: 18  Needle Count Out: 18  Needle Retention Time (min): 25 minutes  Total Face to Face Time (min): 25 minutes              Assessment/Plan   Diagnoses and all orders for this visit:  Female infertility

## 2024-09-17 PROCEDURE — RXMED WILLOW AMBULATORY MEDICATION CHARGE

## 2024-09-18 ENCOUNTER — LAB (OUTPATIENT)
Dept: LAB | Facility: LAB | Age: 34
End: 2024-09-18
Payer: COMMERCIAL

## 2024-09-18 DIAGNOSIS — N97.9 FEMALE INFERTILITY: ICD-10-CM

## 2024-09-18 LAB — TSH SERPL-ACNC: 2.63 MIU/L (ref 0.44–3.98)

## 2024-09-18 PROCEDURE — 84443 ASSAY THYROID STIM HORMONE: CPT

## 2024-09-18 PROCEDURE — 36415 COLL VENOUS BLD VENIPUNCTURE: CPT

## 2024-09-20 ENCOUNTER — APPOINTMENT (OUTPATIENT)
Dept: INTEGRATIVE MEDICINE | Facility: CLINIC | Age: 34
End: 2024-09-20
Payer: COMMERCIAL

## 2024-09-23 ENCOUNTER — APPOINTMENT (OUTPATIENT)
Dept: INTEGRATIVE MEDICINE | Facility: CLINIC | Age: 34
End: 2024-09-23
Payer: COMMERCIAL

## 2024-09-23 DIAGNOSIS — N97.9 FEMALE INFERTILITY: Primary | ICD-10-CM

## 2024-09-23 PROCEDURE — 97810 ACUP 1/> WO ESTIM 1ST 15 MIN: CPT | Performed by: ACUPUNCTURIST

## 2024-09-23 PROCEDURE — 97811 ACUP 1/> W/O ESTIM EA ADD 15: CPT | Performed by: ACUPUNCTURIST

## 2024-09-23 NOTE — PROGRESS NOTES
Acupuncture Visit:     Subjective   Patient ID: Elidia Lindsay is a 34 y.o. female who presents for Fertility Support    Fertility Support:   7/13/24  LMP 8/16/24 Today is CD 38 and still waiting on period to start.  Negative pregnancy test.  Ovulation may have been delayed by upper respiratory infection that she had around that time.  She has been cramping for the past week. She is also feeling breast tenderness and irritability.  She is going to start a FET cycle when her period starts.     Supplements: This is Needed Prenatal  Medications: none        Session Information  Is this acupuncture treatment being billed to the patient's insurance company: No  Visit Type: Follow-up visit  Medical History Reviewed: I have reviewed pertinent medical history in EHR, and no contraindications are present to provide treatment         Review of Systems  Sleep: overall good sleep.   Digestion: BM good  Stress: stress last night otherwise stress is good.           Provider reviewed plan for the acupuncture session, precautions and contraindications. Patient/guardian/hospital staff has given consent to treat with full understanding of what to expect during the session. Before acupuncture began, provider explained to the patient to communicate at any time if the procedure was causing discomfort past their tolerance level. Patient agreed to advise acupuncturist. The acupuncturist counseled the patient on the risks of acupuncture treatment including pain, infection, bleeding, and no relief of pain. The patient was positioned comfortably. There was no evidence of infection at the site of needle insertions.    Objective   Physical Exam              Acupuncture Treatment  Patient Position: Supine on a table  Acupuncture Needling: Yes  Needle Guage: 36 guage /.20/ Blue seirin  Body Points: With retention  Body Points - Bilateral: LI 4, R 6, zigong, SP 10, GB 34, SP 6, SOBEIDA 3  Auricular Points: No  Electroacupuncture Used: No  Other  Techniques Utilized: Ear seeds, TDP Lamp  Ear Seeds: Stainless steel (shenmen)  TDP Lamp Descripton: feet and abdomen  Needle Count In: 14  Needle Count Out: 14  Needle Retention Time (min): 25 minutes  Total Face to Face Time (min): 25 minutes              Assessment/Plan   Diagnoses and all orders for this visit:  Female infertility

## 2024-09-24 ENCOUNTER — SPECIALTY PHARMACY (OUTPATIENT)
Dept: PHARMACY | Facility: CLINIC | Age: 34
End: 2024-09-24

## 2024-09-24 ENCOUNTER — PHARMACY VISIT (OUTPATIENT)
Dept: PHARMACY | Facility: CLINIC | Age: 34
End: 2024-09-24
Payer: COMMERCIAL

## 2024-09-25 ENCOUNTER — LAB (OUTPATIENT)
Dept: LAB | Facility: LAB | Age: 34
End: 2024-09-25
Payer: COMMERCIAL

## 2024-09-25 DIAGNOSIS — N97.9 FEMALE INFERTILITY: ICD-10-CM

## 2024-09-25 LAB
B-HCG SERPL-ACNC: <3 MIU/ML
PROGEST SERPL-MCNC: 14.7 NG/ML

## 2024-09-25 PROCEDURE — 36415 COLL VENOUS BLD VENIPUNCTURE: CPT

## 2024-09-25 PROCEDURE — 84144 ASSAY OF PROGESTERONE: CPT

## 2024-09-25 PROCEDURE — 84702 CHORIONIC GONADOTROPIN TEST: CPT

## 2024-09-25 NOTE — PROGRESS NOTES
Pt sent me message that she id CD #40, negative HPT and trying to start FET cycle. Will go for labs today or tomorrow. Pt verbalized understanding and is agreeable.  Rolando Curry 09/25/24 8:21 AM

## 2024-09-25 NOTE — PROGRESS NOTES
CD #40. Labs today for cycle evaluation; pt trying to start FET cycle. Will contact patient with results and plan.   Rolando Curry 09/25/24 12:32 PM    Component      Latest Ref Rng 9/25/2024   HCG, Beta-Quantitative      <5 mIU/mL <3    Progesterone      ng/mL 14.7      [facility] PROVIDER NOTE- PROGESTERONE CHECK  Ultrasound and/or labs reviewed at [facility].   Results for orders placed or performed in visit on 09/25/24 (from the past 96 hour(s))   Human Chorionic Gonadotropin, Serum Quantitative   Result Value Ref Range    HCG, Beta-Quantitative <3 <5 mIU/mL   Progesterone   Result Value Ref Range    Progesterone 14.7 ng/mL       Ovulatory progesterone  yes    Next steps:  Call with pinky Douglas  09/25/2024  1:08 PM    [employer] message sent to patient after plan reviewed with provider.  Rolando Curry 09/25/24 1:43 PM

## 2024-09-27 ENCOUNTER — APPOINTMENT (OUTPATIENT)
Dept: INTEGRATIVE MEDICINE | Facility: CLINIC | Age: 34
End: 2024-09-27
Payer: COMMERCIAL

## 2024-09-27 DIAGNOSIS — N97.9 FEMALE INFERTILITY: Primary | ICD-10-CM

## 2024-09-27 PROCEDURE — 97810 ACUP 1/> WO ESTIM 1ST 15 MIN: CPT | Performed by: ACUPUNCTURIST

## 2024-09-27 PROCEDURE — 97811 ACUP 1/> W/O ESTIM EA ADD 15: CPT | Performed by: ACUPUNCTURIST

## 2024-09-27 NOTE — PROGRESS NOTES
Acupuncture Visit:     Subjective   Patient ID: Elidia Lindsay is a 34 y.o. female who presents for Fertility Support    Fertility Support:   7/13/24  LMP 8/16/24 Period has still not started.  Negative pregnancy test.  She is going to start a FET cycle when her period starts.     Supplements: This is Needed Prenatal  Medications: none        Session Information  Is this acupuncture treatment being billed to the patient's insurance company: No  Visit Type: Follow-up visit  Medical History Reviewed: I have reviewed pertinent medical history in EHR, and no contraindications are present to provide treatment         Review of Systems  Sleep: no issues  Digestion: BM good  Stress: stress moderate           Provider reviewed plan for the acupuncture session, precautions and contraindications. Patient/guardian/hospital staff has given consent to treat with full understanding of what to expect during the session. Before acupuncture began, provider explained to the patient to communicate at any time if the procedure was causing discomfort past their tolerance level. Patient agreed to advise acupuncturist. The acupuncturist counseled the patient on the risks of acupuncture treatment including pain, infection, bleeding, and no relief of pain. The patient was positioned comfortably. There was no evidence of infection at the site of needle insertions.    Objective   Physical Exam              Acupuncture Treatment  Patient Position: Supine on a table  Acupuncture Needling: Yes  Needle Guage: 40 guage /.16/ Red seirin  Body Points: With retention  Body Points - Bilateral: Du 20, P 6, LI 4, zigong, R 6, ST 25, GB 34, SOBEIDA 3, SP 10  Auricular Points: No  Electroacupuncture Used: No  Other Techniques Utilized: Ear seeds, TDP Lamp  Ear Seeds: Stainless steel (shenmen)  TDP Lamp Descripton: feet and abdomen  Needle Count In: 16  Needle Count Out: 16  Needle Retention Time (min): 25 minutes  Total Face to Face Time (min): 25  minutes              Assessment/Plan   Diagnoses and all orders for this visit:  Female infertility

## 2024-09-30 ENCOUNTER — TELEPHONE (OUTPATIENT)
Dept: ENDOCRINOLOGY | Facility: CLINIC | Age: 34
End: 2024-09-30
Payer: COMMERCIAL

## 2024-09-30 NOTE — TELEPHONE ENCOUNTER
Will add to huddle for tomorrow for FET set up. Sent mychart telling patient to start estrace PO and vaginally and we will reach out tomorrow with dates.  Would likely plan for FET 10/25 and lining check 10/15.    09/30/24 at 4:16 PM - Geri Smyth RN

## 2024-10-01 NOTE — PROGRESS NOTES
Patient called with menses for FET setup.   LMP: 9/30  Protocol: Programmed FET- Estrace 6 mg p.o. + Estrace 2 mg BID PV start day 1 menses/ARTURO 75 mg IM  Adjuncts:  mg-start with with FET, prednisone 10 mg p.o. start with ARTURO,  Notes: severe PEC PP and gest diabetes; also h/o cercalge placement  Tentative lining check: 10/15 (CD 16)  Tentative progesterone start: 10/20  Tentative transfer date: 10/25 with Dr. Douglas (CD 26)  Number of days of progesterone for transfer: 6  Treatment plan confirmed: 8/14/24  In waiver confirmed: 8/14/24  Embryo # confirmed: 3 untested embryos  [facility] of Embryo: [facility]  Embryo # to transfer: 1    Boarding pass signed off:  Yes- has date on boarding pass but note says incomplete- will confirm    *patient states her old ARTURO medication states 1ml daily and not 1.5ml- looks like patient did 1.5ml previously.    Will plan for 1.5mL   Geri Smyth  10/01/2024  10:28 AM     Debbie Gonzalez 10/01/24 1:05 PM      MyChart to patient with plan, will continue oral and vaginal estrace, will plan to start prednisone with FET and baby ASA with transfer. Will call to schedule for 10/15. Aware to do 1.5ml ARTURO and not 1ml.    10/01/24 at 1:27 PM - Geri Smyth RN

## 2024-10-03 ENCOUNTER — APPOINTMENT (OUTPATIENT)
Dept: INTEGRATIVE MEDICINE | Facility: CLINIC | Age: 34
End: 2024-10-03
Payer: COMMERCIAL

## 2024-10-03 DIAGNOSIS — N97.9 FEMALE INFERTILITY: Primary | ICD-10-CM

## 2024-10-03 NOTE — PROGRESS NOTES
Acupuncture Visit:     Subjective   Patient ID: Elidia Lindsay is a 34 y.o. female who presents for Fertility Support    Fertility Support:   7/13/24   8/16/24 LMP  Today is CD 3.  Her period finally came and she had more cramps than usual and a heavy flow for 2 days.  Starting to slow down now.  Mood is significantly improved since period started.  She is in a FET cycle and taking estrogen.  Transfer date is 10/25.      Supplements: This is Needed Prenatal  Medications: none        Session Information  Is this acupuncture treatment being billed to the patient's insurance company: No  Visit Type: Follow-up visit  Medical History Reviewed: I have reviewed pertinent medical history in EHR, and no contraindications are present to provide treatment         Review of Systems  Sleep: no issues  Digestion: BM good  Stress: stress and mood has improved.            Provider reviewed plan for the acupuncture session, precautions and contraindications. Patient/guardian/hospital staff has given consent to treat with full understanding of what to expect during the session. Before acupuncture began, provider explained to the patient to communicate at any time if the procedure was causing discomfort past their tolerance level. Patient agreed to advise acupuncturist. The acupuncturist counseled the patient on the risks of acupuncture treatment including pain, infection, bleeding, and no relief of pain. The patient was positioned comfortably. There was no evidence of infection at the site of needle insertions.    Objective   Physical Exam              Acupuncture Treatment  Patient Position: Prone on a table  Acupuncture Needling: Yes  Needle Guage: 36 guage /.20/ Blue seirin  Body Points: With retention  Body Points - Bilateral: UB 20, UB 23, UB 28, GB 34, SP 6, UB 57, KD 3  Auricular Points: No  Electroacupuncture Used: Yes  Electroacupuncture Points Used: UB 23 to UB 28 and SP 6 to UB 57  Electroacupuncture Frequency:  Continuous Hz  Electroacupuncture Intensity: 2  Electroacupuncture Frequency in Hertz 1: 2  Other Techniques Utilized: Ear seeds, TDP Lamp  Ear Seeds: Stainless steel (shenmen)  TDP Lamp Descripton: lower back  Needle Count In: 14  Needle Count Out: 14  Needle Retention Time (min): 25 minutes  Total Face to Face Time (min): 25 minutes              Assessment/Plan   Diagnoses and all orders for this visit:  Female infertility

## 2024-10-11 ENCOUNTER — TELEPHONE (OUTPATIENT)
Dept: ENDOCRINOLOGY | Facility: CLINIC | Age: 34
End: 2024-10-11

## 2024-10-11 ENCOUNTER — PATIENT MESSAGE (OUTPATIENT)
Dept: ENDOCRINOLOGY | Facility: CLINIC | Age: 34
End: 2024-10-11

## 2024-10-11 ENCOUNTER — APPOINTMENT (OUTPATIENT)
Dept: INTEGRATIVE MEDICINE | Facility: CLINIC | Age: 34
End: 2024-10-11
Payer: COMMERCIAL

## 2024-10-11 DIAGNOSIS — N97.9 FEMALE INFERTILITY: Primary | ICD-10-CM

## 2024-10-11 PROCEDURE — 97811 ACUP 1/> W/O ESTIM EA ADD 15: CPT | Performed by: ACUPUNCTURIST

## 2024-10-11 PROCEDURE — 97810 ACUP 1/> WO ESTIM 1ST 15 MIN: CPT | Performed by: ACUPUNCTURIST

## 2024-10-11 NOTE — TELEPHONE ENCOUNTER
Reason for call:   Notes: Patient would like to speak with nurse about changing the date for her lining check that is scheduled for Tuesday.

## 2024-10-11 NOTE — TELEPHONE ENCOUNTER
Telephone call returned to patient, voicemail box received. This RN informed patient of reason for call back via voicemail; to discuss timing of lining check next Tuesday. Office number and hours provided to patient via voicemail.   Lakishahart sent to patient for communication also.     10/11/24 at 9:40 AM - Briana Ballesteros RN

## 2024-10-11 NOTE — PROGRESS NOTES
Acupuncture Visit:     Subjective   Patient ID: Elidia Lindsay is a 34 y.o. female who presents for Fertility Support    Fertility Support:   LMP 10/1/24  Today is CD 3.  She is in a FET cycle and taking estrogen.  Estrogen is going well, but she is feeling more inflamed and more body pain that she suspects is from the estrogen. Transfer date is 10/25.      Supplements: This is Needed Prenatal  Medications: none        Session Information  Is this acupuncture treatment being billed to the patient's insurance company: No  Visit Type: Follow-up visit  Medical History Reviewed: I have reviewed pertinent medical history in EHR, and no contraindications are present to provide treatment         Review of Systems  Sleep: no issues  Digestion: BM good  Stress: doing well           Provider reviewed plan for the acupuncture session, precautions and contraindications. Patient/guardian/hospital staff has given consent to treat with full understanding of what to expect during the session. Before acupuncture began, provider explained to the patient to communicate at any time if the procedure was causing discomfort past their tolerance level. Patient agreed to advise acupuncturist. The acupuncturist counseled the patient on the risks of acupuncture treatment including pain, infection, bleeding, and no relief of pain. The patient was positioned comfortably. There was no evidence of infection at the site of needle insertions.    Objective   Physical Exam              Acupuncture Treatment  Patient Position: Prone on a table  Acupuncture Needling: Yes  Needle Guage: 40 guage /.16/ Red seirin  Body Points: With retention  Body Points - Bilateral: UB 23, UB 28, SP 6, UB 57, GB 41, KD 3 (GB 34 too painful to needle each week)  Auricular Points: No  Electroacupuncture Used: Yes  Electroacupuncture Points Used: UB 23 to UB 28 and SP 6 to UB 57  Electroacupuncture Frequency: Continuous Hz  Electroacupuncture Intensity:  2  Electroacupuncture Frequency in Hertz 1: 2  Other Techniques Utilized: Ear seeds, TDP Lamp  Ear Seeds: Stainless steel (shenmen)  TDP Lamp Descripton: lower back  Needle Count In: 12  Needle Count Out: 12  Needle Retention Time (min): 25 minutes  Total Face to Face Time (min): 25 minutes              Assessment/Plan   Diagnoses and all orders for this visit:  Female infertility

## 2024-10-14 DIAGNOSIS — N97.9 FEMALE INFERTILITY: Primary | ICD-10-CM

## 2024-10-15 ENCOUNTER — SPECIALTY PHARMACY (OUTPATIENT)
Dept: PHARMACY | Facility: CLINIC | Age: 34
End: 2024-10-15

## 2024-10-15 ENCOUNTER — ANCILLARY PROCEDURE (OUTPATIENT)
Dept: ENDOCRINOLOGY | Facility: CLINIC | Age: 34
End: 2024-10-15

## 2024-10-15 ENCOUNTER — PHARMACY VISIT (OUTPATIENT)
Dept: PHARMACY | Facility: CLINIC | Age: 34
End: 2024-10-15
Payer: COMMERCIAL

## 2024-10-15 ENCOUNTER — APPOINTMENT (OUTPATIENT)
Dept: INTEGRATIVE MEDICINE | Facility: CLINIC | Age: 34
End: 2024-10-15
Payer: COMMERCIAL

## 2024-10-15 ENCOUNTER — ANCILLARY PROCEDURE (OUTPATIENT)
Dept: ENDOCRINOLOGY | Facility: CLINIC | Age: 34
End: 2024-10-15
Payer: COMMERCIAL

## 2024-10-15 DIAGNOSIS — N97.9 FEMALE INFERTILITY: ICD-10-CM

## 2024-10-15 LAB
ESTRADIOL SERPL-MCNC: 2224 PG/ML
PROGEST SERPL-MCNC: 0.7 NG/ML

## 2024-10-15 PROCEDURE — 82670 ASSAY OF TOTAL ESTRADIOL: CPT

## 2024-10-15 PROCEDURE — 76857 US EXAM PELVIC LIMITED: CPT

## 2024-10-15 PROCEDURE — RXMED WILLOW AMBULATORY MEDICATION CHARGE

## 2024-10-15 PROCEDURE — 84144 ASSAY OF PROGESTERONE: CPT

## 2024-10-15 NOTE — PROGRESS NOTES
CYCLING NOTE    Here for US and/or lab monitoring; relevant findings reviewed. 34 year old patient of Dr. Douglas is here for a lining check for FET #4. Patient is tentatively to start ARTURO on 10/20 for a transfer on 10/25.    Protocol: Programmed FET- Estrace 6 mg p.o. + Estrace 2 mg BID PV start day 1 menses/ARTURO 75 mg IM  Adjuncts:  mg-start with with FET, prednisone 10 mg p.o. start with ARTURO    Patient did not stay for discussion after monitoring,  Team will contact patient later today with results and plan.    IVON ORNELAS  10/15/2024  7:26 AM      Placed call to patient -- left message relaying plan as documented in stimulation summary. Relayed in message reasoning for patient to return on Friday for additional visit prior to starting ARTURO. Encouraged patient to return call to schedule her next lining check and if she has additional questions to relay this to the  when she calls to schedule.   IVON ORNELAS RN 10/15/2024 13:47

## 2024-10-18 ENCOUNTER — ANCILLARY PROCEDURE (OUTPATIENT)
Dept: ENDOCRINOLOGY | Facility: CLINIC | Age: 34
End: 2024-10-18

## 2024-10-18 ENCOUNTER — APPOINTMENT (OUTPATIENT)
Dept: INTEGRATIVE MEDICINE | Facility: CLINIC | Age: 34
End: 2024-10-18
Payer: COMMERCIAL

## 2024-10-18 DIAGNOSIS — N97.9 FEMALE INFERTILITY: ICD-10-CM

## 2024-10-18 LAB
ESTRADIOL SERPL-MCNC: 868 PG/ML
PROGEST SERPL-MCNC: 0.3 NG/ML

## 2024-10-18 PROCEDURE — 76857 US EXAM PELVIC LIMITED: CPT

## 2024-10-18 PROCEDURE — 84144 ASSAY OF PROGESTERONE: CPT

## 2024-10-18 PROCEDURE — 82670 ASSAY OF TOTAL ESTRADIOL: CPT

## 2024-10-18 NOTE — PROGRESS NOTES
CYCLING NOTE    Here for US and/or lab monitoring; relevant findings reviewed.  34 year old patient of Dr. Douglas is here for a lining check for FET #4. Patient is tentatively to start ARTURO on 10/20 for a transfer on 10/25.     Protocol: Programmed FET- Estrace 6 mg p.o. + Estrace 2 mg BID PV start day 1 menses/ARTURO 75 mg IM  Adjuncts:  mg-start with with FET, prednisone 10 mg p.o. start with ARTURO    Patient did not stay for discussion after monitoring,  Team will contact patient later today with results and plan.    IVON ORNELAS  10/18/2024  10:01 AM    Placed call to patient to review plan as documented in stimulation summary -- left detailed voicemail. Reviewed that since we will need to have her back on Monday for another lining check, we will plan to have her now start ARTURO on 10/23 for a FET on 10/28. Apologized for change in original plan but reviewed reasoning for wanting to ensure lining is as optimal as possible prior to transfer. Encouraged patient to call back with any additional questions at this time but to also be sure to call to schedule for Monday visit.   IVON ORNELAS RN 10/18/2024 14:06

## 2024-10-21 ENCOUNTER — ANCILLARY PROCEDURE (OUTPATIENT)
Dept: ENDOCRINOLOGY | Facility: CLINIC | Age: 34
End: 2024-10-21

## 2024-10-21 DIAGNOSIS — N97.9 FEMALE INFERTILITY: ICD-10-CM

## 2024-10-21 LAB
ESTRADIOL SERPL-MCNC: 1316 PG/ML
PROGEST SERPL-MCNC: 0.3 NG/ML

## 2024-10-21 PROCEDURE — 82670 ASSAY OF TOTAL ESTRADIOL: CPT

## 2024-10-21 PROCEDURE — 84144 ASSAY OF PROGESTERONE: CPT

## 2024-10-21 PROCEDURE — 76857 US EXAM PELVIC LIMITED: CPT

## 2024-10-21 RX ORDER — MEDROXYPROGESTERONE ACETATE 10 MG/1
10 TABLET ORAL DAILY
Qty: 10 TABLET | Refills: 0 | Status: SHIPPED | OUTPATIENT
Start: 2024-10-21 | End: 2024-10-31

## 2024-10-21 NOTE — PROGRESS NOTES
CYCLING NOTE - REPEAT LINING CHECK    Here for US and/or lab monitoring; relevant findings reviewed.    34yr old patient of Dr. Douglas - FET #4. Here today for repeat lining check. Programmed FET; Estrace 6 mg PO + Estrace 2 mg PV BID start day 1 menses/ARTURO 75 mg,  mg to start with FET + Prednisone 10 mg to start with ARTURO.   Patient here on 10/15 as well as 10/18 for lining checks. Dates adjusted d/t non-optimal lining and changes made to plan for tentative ARTURO start on 10/23. Tentative transfer 10/28.    Patient did not stay for discussion after monitoring,  Team will contact patient later today with results and plan.    Briana Ballesteros  10/21/2024  8:04 AM      Lining check with prior successful FET cycle showed a lining of 8 mm (increased from 5.4 to 8 mm 2-3 days after adding in vaginal estrace).  Lining today is only 6.5 mm.  Will plan to cancel cycle, start provera 10 x 10.  Patient to follow up with Dr. Douglas to discuss options and next steps.    Orlando Kirby 10/21/24 1:46 PM    Telephone call made to patient with MD plan above. Patient agreeable to cancel cycle with non-optimal lining. Patient aware to stop FET medications and to start Provera 10 mg x 10 days. Staff message sent to Dr. Douglas regarding cancelled cycle. Patient transferred to  to schedule FUV with Dr. Douglas.    10/21/24 at 2:17 PM - Briana Ballesteros RN

## 2024-10-22 ENCOUNTER — APPOINTMENT (OUTPATIENT)
Dept: INTEGRATIVE MEDICINE | Facility: CLINIC | Age: 34
End: 2024-10-22
Payer: COMMERCIAL

## 2024-10-22 DIAGNOSIS — N97.9 FEMALE INFERTILITY: ICD-10-CM

## 2024-10-22 RX ORDER — ESTRADIOL 0.1 MG/D
3 FILM, EXTENDED RELEASE TRANSDERMAL
Qty: 48 PATCH | Refills: 2 | Status: SHIPPED | OUTPATIENT
Start: 2024-10-22 | End: 2025-10-22

## 2024-10-25 ENCOUNTER — APPOINTMENT (OUTPATIENT)
Dept: INTEGRATIVE MEDICINE | Facility: CLINIC | Age: 34
End: 2024-10-25
Payer: COMMERCIAL

## 2024-10-25 ENCOUNTER — DOCUMENTATION (OUTPATIENT)
Dept: ENDOCRINOLOGY | Facility: CLINIC | Age: 34
End: 2024-10-25

## 2024-10-25 NOTE — PROGRESS NOTES
Fertility Plan Update:     Updated Protocol: Patch protocol, may add Estrace 2 mg BID PV as needed/ARTURO 75 mg IM   Adjuncts:  mg-start with with FET, prednisone 10 mg p.o. start with ARTURO    Geri Douglas 10/25/24 11:40 AM

## 2024-10-28 ENCOUNTER — PHARMACY VISIT (OUTPATIENT)
Dept: PHARMACY | Facility: CLINIC | Age: 34
End: 2024-10-28
Payer: COMMERCIAL

## 2024-10-28 ENCOUNTER — APPOINTMENT (OUTPATIENT)
Dept: INTEGRATIVE MEDICINE | Facility: CLINIC | Age: 34
End: 2024-10-28
Payer: COMMERCIAL

## 2024-10-28 ENCOUNTER — SPECIALTY PHARMACY (OUTPATIENT)
Dept: PHARMACY | Facility: CLINIC | Age: 34
End: 2024-10-28

## 2024-10-28 PROCEDURE — RXMED WILLOW AMBULATORY MEDICATION CHARGE

## 2024-10-29 ENCOUNTER — TELEPHONE (OUTPATIENT)
Dept: ENDOCRINOLOGY | Facility: CLINIC | Age: 34
End: 2024-10-29

## 2024-10-29 ENCOUNTER — APPOINTMENT (OUTPATIENT)
Dept: INTEGRATIVE MEDICINE | Facility: CLINIC | Age: 34
End: 2024-10-29
Payer: COMMERCIAL

## 2024-10-30 ENCOUNTER — PHARMACY VISIT (OUTPATIENT)
Dept: PHARMACY | Facility: CLINIC | Age: 34
End: 2024-10-30
Payer: COMMERCIAL

## 2024-10-30 ENCOUNTER — SPECIALTY PHARMACY (OUTPATIENT)
Dept: PHARMACY | Facility: CLINIC | Age: 34
End: 2024-10-30

## 2024-10-30 PROCEDURE — RXMED WILLOW AMBULATORY MEDICATION CHARGE

## 2024-10-31 ENCOUNTER — APPOINTMENT (OUTPATIENT)
Dept: INTEGRATIVE MEDICINE | Facility: CLINIC | Age: 34
End: 2024-10-31
Payer: COMMERCIAL

## 2024-10-31 DIAGNOSIS — N97.9 FEMALE INFERTILITY: Primary | ICD-10-CM

## 2024-10-31 PROCEDURE — 97810 ACUP 1/> WO ESTIM 1ST 15 MIN: CPT | Performed by: ACUPUNCTURIST

## 2024-10-31 PROCEDURE — 97811 ACUP 1/> W/O ESTIM EA ADD 15: CPT | Performed by: ACUPUNCTURIST

## 2024-10-31 NOTE — PROGRESS NOTES
Acupuncture Visit:     Subjective   Patient ID: Elidia Lindsay is a 34 y.o. female who presents for Fertility Support    Fertility Support:  LMP 10/29/24 Today is CD 3.  She is on estradiol patches to prepare for a transfer.  She did not build enough of a lining last cycle. She took Provera to induce a period.      Supplements: This is Needed Prenatal  Medications: none        Session Information  Is this acupuncture treatment being billed to the patient's insurance company: No  Visit Type: Follow-up visit  Medical History Reviewed: I have reviewed pertinent medical history in EHR, and no contraindications are present to provide treatment         Review of Systems  Sleep: no issues  Digestion: BM good  Stress: doing well           Provider reviewed plan for the acupuncture session, precautions and contraindications. Patient/guardian/hospital staff has given consent to treat with full understanding of what to expect during the session. Before acupuncture began, provider explained to the patient to communicate at any time if the procedure was causing discomfort past their tolerance level. Patient agreed to advise acupuncturist. The acupuncturist counseled the patient on the risks of acupuncture treatment including pain, infection, bleeding, and no relief of pain. The patient was positioned comfortably. There was no evidence of infection at the site of needle insertions.    Objective   Physical Exam              Acupuncture Treatment  Patient Position: Prone on a table  Acupuncture Needling: Yes  Needle Guage: 36 guage /.20/ Blue seirin  Body Points: With retention  Body Points - Bilateral: GB 21, UB 18, UB 20, UB 23, UB 28, GB 34, SP 6, UB 57, SOBEIDA 8  Auricular Points: No  Electroacupuncture Used: Yes  Electroacupuncture Points Used: UB 23 to UB 28 and UB 57 to SP 6  Electroacupuncture Frequency: Continuous Hz  Electroacupuncture Intensity: 2  Electroacupuncture Frequency in Hertz 1: 2  Other Techniques Utilized: Ear  seeds, TDP Lamp  Ear Seeds: Stainless steel (shenmen)  TDP Lamp Descripton: lower back and feet  Needle Count In: 18  Needle Count Out: 18  Needle Retention Time (min): 25 minutes  Total Face to Face Time (min): 25 minutes              Assessment/Plan   Diagnoses and all orders for this visit:  Female infertility

## 2024-11-01 ENCOUNTER — SPECIALTY PHARMACY (OUTPATIENT)
Dept: PHARMACY | Facility: CLINIC | Age: 34
End: 2024-11-01

## 2024-11-01 ENCOUNTER — PHARMACY VISIT (OUTPATIENT)
Dept: PHARMACY | Facility: CLINIC | Age: 34
End: 2024-11-01
Payer: COMMERCIAL

## 2024-11-01 PROCEDURE — RXMED WILLOW AMBULATORY MEDICATION CHARGE

## 2024-11-08 ENCOUNTER — APPOINTMENT (OUTPATIENT)
Dept: INTEGRATIVE MEDICINE | Facility: CLINIC | Age: 34
End: 2024-11-08

## 2024-11-08 DIAGNOSIS — N97.9 FEMALE INFERTILITY: Primary | ICD-10-CM

## 2024-11-08 PROCEDURE — 97810 ACUP 1/> WO ESTIM 1ST 15 MIN: CPT | Performed by: ACUPUNCTURIST

## 2024-11-08 PROCEDURE — 97811 ACUP 1/> W/O ESTIM EA ADD 15: CPT | Performed by: ACUPUNCTURIST

## 2024-11-08 NOTE — PROGRESS NOTES
Acupuncture Visit:     Subjective   Patient ID: Elidia Lindsay is a 34 y.o. female who presents for Fertility Support    Fertility Support:  LMP 10/29/24 Today is CD 11.  She is on estradiol patches to prepare for a transfer.  She is feeling bloated but tolerating the estrogen much better.  No headaches.  Mood is good.  She is feeling good overall.  She has a lining check on Tuesday.     Supplements: This is Needed Prenatal  Medications: none        Session Information  Is this acupuncture treatment being billed to the patient's insurance company: No  Visit Type: Follow-up visit  Medical History Reviewed: I have reviewed pertinent medical history in EHR, and no contraindications are present to provide treatment         Review of Systems  Sleep: no issues  Digestion: BM good  Stress: doing well           Provider reviewed plan for the acupuncture session, precautions and contraindications. Patient/guardian/hospital staff has given consent to treat with full understanding of what to expect during the session. Before acupuncture began, provider explained to the patient to communicate at any time if the procedure was causing discomfort past their tolerance level. Patient agreed to advise acupuncturist. The acupuncturist counseled the patient on the risks of acupuncture treatment including pain, infection, bleeding, and no relief of pain. The patient was positioned comfortably. There was no evidence of infection at the site of needle insertions.    Objective   Physical Exam              Acupuncture Treatment  Patient Position: Supine on a table  Acupuncture Needling: Yes  Needle Guage: 36 guage /.20/ Blue seirin  Body Points: With retention  Body Points - Bilateral: UB 20, UB 23, UB 28, UB 57, SP 6, KD 3  Auricular Points: No  Electroacupuncture Used: Yes  Electroacupuncture Points Used: UB 23 to UB 28 and SP 6 to UB 57  Electroacupuncture Frequency: Continuous Hz  Electroacupuncture Intensity: 2  Electroacupuncture  Frequency in Hertz 1: 2  Other Techniques Utilized: Ear seeds, TDP Lamp  Ear Seeds: Stainless steel (shenmen)  TDP Lamp Descripton: lower back and feet  Needle Count In: 12  Needle Count Out: 12  Needle Retention Time (min): 25 minutes  Total Face to Face Time (min): 25 minutes              Assessment/Plan   Diagnoses and all orders for this visit:  Female infertility

## 2024-11-12 ENCOUNTER — ANCILLARY PROCEDURE (OUTPATIENT)
Dept: ENDOCRINOLOGY | Facility: CLINIC | Age: 34
End: 2024-11-12
Payer: COMMERCIAL

## 2024-11-12 DIAGNOSIS — N97.9 FEMALE INFERTILITY: ICD-10-CM

## 2024-11-12 LAB
ESTRADIOL SERPL-MCNC: 622 PG/ML
PROGEST SERPL-MCNC: 0.7 NG/ML
TSH SERPL-ACNC: 2.8 MIU/L (ref 0.44–3.98)

## 2024-11-12 PROCEDURE — 76857 US EXAM PELVIC LIMITED: CPT

## 2024-11-12 PROCEDURE — 84144 ASSAY OF PROGESTERONE: CPT

## 2024-11-12 PROCEDURE — 84443 ASSAY THYROID STIM HORMONE: CPT

## 2024-11-12 PROCEDURE — 82670 ASSAY OF TOTAL ESTRADIOL: CPT

## 2024-11-12 NOTE — PROGRESS NOTES
CYCLING NOTE - LINING CHECK    Here for US and/or lab monitoring; relevant findings reviewed.    34yr old patient of Dr. Douglas - FET #5. Here today for lining check. Patch protocol, may add Estrace 2 mg BID PV as needed/ARTURO 75 mg IM Adjuncts:  mg-start with with FET, prednisone 10 mg p.o. start with ARTURO. Tentative ARTURO start 11/16/24. Tentative transfer 11/21/24 with Dr. Douglas.  BP signed off: yes signed by Geri Douglas MD at 10/29/2024     Patient would like to do a TSH level - if okay this RN to add on to labs today    (Today is a patch day)  Patient stayed for nurse visit. Pain is 0/10  Team will contact patient later today with results and plan.    Briana Ballesteros  11/12/2024  7:34 AM    Telephone call made to patient with MD plan and detailed MyChart sent to patient with plan. Patient agreeable to begin vaginal Estrace and RTC on Friday for r/p monitoring.    11/12/24 at 2:43 PM - Briana Ballesteros RN

## 2024-11-13 DIAGNOSIS — E03.8 OTHER SPECIFIED HYPOTHYROIDISM: Primary | ICD-10-CM

## 2024-11-13 RX ORDER — LEVOTHYROXINE SODIUM 25 UG/1
25 TABLET ORAL
Qty: 30 TABLET | Refills: 1 | Status: SHIPPED | OUTPATIENT
Start: 2024-11-13 | End: 2025-01-12

## 2024-11-14 ENCOUNTER — APPOINTMENT (OUTPATIENT)
Dept: INTEGRATIVE MEDICINE | Facility: CLINIC | Age: 34
End: 2024-11-14
Payer: COMMERCIAL

## 2024-11-15 ENCOUNTER — APPOINTMENT (OUTPATIENT)
Dept: ENDOCRINOLOGY | Facility: CLINIC | Age: 34
End: 2024-11-15
Payer: COMMERCIAL

## 2024-11-15 ENCOUNTER — ANCILLARY PROCEDURE (OUTPATIENT)
Dept: ENDOCRINOLOGY | Facility: CLINIC | Age: 34
End: 2024-11-15
Payer: COMMERCIAL

## 2024-11-15 ENCOUNTER — APPOINTMENT (OUTPATIENT)
Dept: INTEGRATIVE MEDICINE | Facility: CLINIC | Age: 34
End: 2024-11-15
Payer: COMMERCIAL

## 2024-11-15 DIAGNOSIS — N97.9 FEMALE INFERTILITY: ICD-10-CM

## 2024-11-15 DIAGNOSIS — N97.9 FEMALE INFERTILITY: Primary | ICD-10-CM

## 2024-11-15 LAB
ESTRADIOL SERPL-MCNC: 1878 PG/ML
PROGEST SERPL-MCNC: 0.4 NG/ML

## 2024-11-15 PROCEDURE — RXMED WILLOW AMBULATORY MEDICATION CHARGE

## 2024-11-15 PROCEDURE — 97811 ACUP 1/> W/O ESTIM EA ADD 15: CPT | Performed by: ACUPUNCTURIST

## 2024-11-15 PROCEDURE — 84144 ASSAY OF PROGESTERONE: CPT

## 2024-11-15 PROCEDURE — 76857 US EXAM PELVIC LIMITED: CPT

## 2024-11-15 PROCEDURE — 97810 ACUP 1/> WO ESTIM 1ST 15 MIN: CPT | Performed by: ACUPUNCTURIST

## 2024-11-15 PROCEDURE — 82670 ASSAY OF TOTAL ESTRADIOL: CPT

## 2024-11-15 NOTE — PROGRESS NOTES
Acupuncture Visit:     Subjective   Patient ID: Elidia Lindsay is a 34 y.o. female who presents for Fertility Support    Fertility Support:  She is in a FET cycle. Lining is 7.1mm and trilaminar and no fluid. She is scheduled for a transfer on Thursday 11/21.  She has been feeling more sore overall the past couple of days since starting the estrogen suppositories.  Some lower back tightness.     Supplements: This is Needed Prenatal  Medications: estrogen        Session Information  Is this acupuncture treatment being billed to the patient's insurance company: No  Visit Type: Follow-up visit  Medical History Reviewed: I have reviewed pertinent medical history in EHR, and no contraindications are present to provide treatment         Review of Systems  Sleep: no issues  Digestion: BM good  Stress: doing well           Provider reviewed plan for the acupuncture session, precautions and contraindications. Patient/guardian/hospital staff has given consent to treat with full understanding of what to expect during the session. Before acupuncture began, provider explained to the patient to communicate at any time if the procedure was causing discomfort past their tolerance level. Patient agreed to advise acupuncturist. The acupuncturist counseled the patient on the risks of acupuncture treatment including pain, infection, bleeding, and no relief of pain. The patient was positioned comfortably. There was no evidence of infection at the site of needle insertions.    Objective   Physical Exam              Acupuncture Treatment  Patient Position: Supine on a table  Acupuncture Needling: Yes  Needle Guage: 36 guage /.20/ Blue seirin  Body Points: With retention  Body Points - Bilateral: Du 20, LI 4, R 6, zigong, ST 36, SP 10, SP 6  Auricular Points: No  Electroacupuncture Used: No  Other Techniques Utilized: Ear seeds, TDP Lamp  Ear Seeds: Stainless steel (shenmen)  TDP Lamp Descripton: feet and abdomen  Needle Count In:  12  Needle Count Out: 12  Needle Retention Time (min): 25 minutes  Total Face to Face Time (min): 25 minutes              Assessment/Plan   Diagnoses and all orders for this visit:  Female infertility

## 2024-11-15 NOTE — PROGRESS NOTES
CYCLING NOTE-patch protocol programmed FET     Protocol: Updated Protocol: Patch protocol, may add Estrace 2 mg BID PV as needed/ARTURO 75 mg IM   Adjuncts:  mg-start with with FET, prednisone 10 mg p.o. start with ARTURO   lining check: 11/12 (CD 15) AND 11/15   Tentative progesterone start: 11/16  Tentative transfer date: 11/21 (WITH DR VERNON)  Notes: added in vaginal estrace on 11/12     Here for US and/or lab monitoring; relevant findings reviewed.    Patient did not stay for discussion after monitoring,  Team will contact patient later today with results and plan.    Chito Lackey  11/15/2024  9:04 AM    Called patient with plan. Patient will start prednisone tomorrow with start of ARTURO and will start baby asa on day of transfer. Patient to expect phone call with transfer time. Patient agreeable to plan.   CHITO LACKEY on 11/15/24 at 1:08 PM.

## 2024-11-18 ENCOUNTER — PHARMACY VISIT (OUTPATIENT)
Dept: PHARMACY | Facility: CLINIC | Age: 34
End: 2024-11-18
Payer: COMMERCIAL

## 2024-11-18 ENCOUNTER — SPECIALTY PHARMACY (OUTPATIENT)
Dept: PHARMACY | Facility: CLINIC | Age: 34
End: 2024-11-18

## 2024-11-20 ENCOUNTER — PREP FOR PROCEDURE (OUTPATIENT)
Dept: ENDOCRINOLOGY | Facility: CLINIC | Age: 34
End: 2024-11-20
Payer: COMMERCIAL

## 2024-11-20 RX ORDER — ACETAMINOPHEN 325 MG/1
650 TABLET ORAL ONCE AS NEEDED
Status: CANCELLED | OUTPATIENT
Start: 2024-11-20

## 2024-11-21 ENCOUNTER — HOSPITAL ENCOUNTER (OUTPATIENT)
Dept: ENDOCRINOLOGY | Facility: CLINIC | Age: 34
Discharge: HOME | End: 2024-11-21

## 2024-11-21 ENCOUNTER — ALLIED HEALTH (OUTPATIENT)
Dept: INTEGRATIVE MEDICINE | Facility: CLINIC | Age: 34
End: 2024-11-21

## 2024-11-21 ENCOUNTER — ANCILLARY PROCEDURE (OUTPATIENT)
Dept: ENDOCRINOLOGY | Facility: CLINIC | Age: 34
End: 2024-11-21

## 2024-11-21 DIAGNOSIS — N97.9 FEMALE INFERTILITY: ICD-10-CM

## 2024-11-21 DIAGNOSIS — N97.9 FEMALE INFERTILITY: Primary | ICD-10-CM

## 2024-11-21 DIAGNOSIS — Z31.83 ENCOUNTER FOR ASSISTED REPRODUCTIVE FERTILITY CYCLE: ICD-10-CM

## 2024-11-21 LAB — PROGEST SERPL-MCNC: 51.7 NG/ML

## 2024-11-21 PROCEDURE — 58974 EMBRYO TRANSFER INTRAUTERINE: CPT | Performed by: OBSTETRICS & GYNECOLOGY

## 2024-11-21 PROCEDURE — 89352 THAWING CRYOPRESRVED EMBRYO: CPT | Performed by: OBSTETRICS & GYNECOLOGY

## 2024-11-21 PROCEDURE — 84144 ASSAY OF PROGESTERONE: CPT

## 2024-11-21 PROCEDURE — ACUFI: Performed by: ACUPUNCTURIST

## 2024-11-21 PROCEDURE — ACUFF: Performed by: ACUPUNCTURIST

## 2024-11-21 PROCEDURE — 76998 US GUIDE INTRAOP: CPT | Performed by: OBSTETRICS & GYNECOLOGY

## 2024-11-21 PROCEDURE — 89255 PREPARE EMBRYO FOR TRANSFER: CPT | Performed by: OBSTETRICS & GYNECOLOGY

## 2024-11-21 PROCEDURE — 89253 EMBRYO HATCHING: CPT | Performed by: OBSTETRICS & GYNECOLOGY

## 2024-11-21 RX ORDER — ACETAMINOPHEN 325 MG/1
650 TABLET ORAL ONCE AS NEEDED
Status: DISCONTINUED | OUTPATIENT
Start: 2024-11-21 | End: 2024-11-22 | Stop reason: HOSPADM

## 2024-11-21 NOTE — PROGRESS NOTES
Acupuncture Visit:     Subjective   Patient ID: Elidia Lindsay is a 34 y.o. female who presents for Acupuncture Support After Embryo Transfer    Fertility Support:  Elidia is here today for acupuncture support after her frozen embryo transfer.      Supplements: This is Needed Prenatal  Medications: estrogen, progesterone, prednisone        Session Information  Is this acupuncture treatment being billed to the patient's insurance company: No  Visit Type: Follow-up visit  Medical History Reviewed: I have reviewed pertinent medical history in EHR, and no contraindications are present to provide treatment         Review of Systems  Sleep: no issues  Digestion: BM good  Stress: doing well           Provider reviewed plan for the acupuncture session, precautions and contraindications. Patient/guardian/hospital staff has given consent to treat with full understanding of what to expect during the session. Before acupuncture began, provider explained to the patient to communicate at any time if the procedure was causing discomfort past their tolerance level. Patient agreed to advise acupuncturist. The acupuncturist counseled the patient on the risks of acupuncture treatment including pain, infection, bleeding, and no relief of pain. The patient was positioned comfortably. There was no evidence of infection at the site of needle insertions.    Objective   Physical Exam              Acupuncture Treatment  Patient Position: Supine on a table  Acupuncture Needling: Yes  Needle Guage: 40 guage /.16/ Red seirin  Body Points: With retention  Body Points - Bilateral: LI 4, SP 10, SP 6, ST 36  Auricular Points: Yes  Auricular Points - Bilateral: shenmen, uterus  Electroacupuncture Used: No  Other Techniques Utilized: Ear seeds  Ear Seeds: Stainless steel (shenmen)  Needle Count In: 12  Needle Count Out: 12  Needle Retention Time (min): 25 minutes  Total Face to Face Time (min): 25 minutes              Assessment/Plan   Diagnoses and  all orders for this visit:  Female infertility

## 2024-11-21 NOTE — PROGRESS NOTES
Patient ID: Elidia Lindsay is a 34 y.o. female.    Embryo Transfer    Date/Time: 11/21/2024 11:37 AM    Performed by: Geri Douglas MD  Authorized by: Geri Douglas MD    Consent:     Consent obtained:  Written    Consent given by:  Patient    Procedure risks and benefits discussed: yes      Patient questions answered: yes      Patient agrees, verbalizes understanding, and wants to proceed: yes      Educational handouts given: yes      Instructions and paperwork completed: yes    Procedure:     Pelvic exam performed: No      Cervix cleaned and prepped: Yes      Speculum placed in vagina: Yes      Ultrasound guidance: Yes      Catheter type:  Sure View Mackenzie    Difficulty: easy      Estimated blood loss:  None  Post-procedure:     Patient tolerated procedure well: yes    Comments:      Preop diagnosis: Infertility  Post op diagnosis: Same  Assistant: None  Depth: 7 cm  Curve: 15  Distance from fundus: 15 mm  Embryo stage at day of transfer: day 6  Embryo notes: 4AA   PGT: Not performed  Anesthesia: None    IV Fluids: None  UOP: Not recorded  Specimens: None  Complications: None    Attending Attestation: I was physically present for key and critical portions performed by the fellow. I reviewed the fellow's documentation and discussed the patient with the fellow. I agree with the fellow's medical decision making as documented in the fellow's note.   Geri Douglas 11/21/24 11:37 AM

## 2024-11-21 NOTE — PROGRESS NOTES
Acupuncture Visit:     Subjective   Patient ID: Elidia Lindsay is a 34 y.o. female who presents for Acupuncture Support Before Embryo Transfer    Fertility Support:  Elidia is here today for acupuncture support before her frozen embryo transfer.  She is feeling well today both mentally and physically.    Supplements: This is Needed Prenatal  Medications: estrogen, progesterone, prednisone        Session Information  Is this acupuncture treatment being billed to the patient's insurance company: No  Visit Type: Follow-up visit  Medical History Reviewed: I have reviewed pertinent medical history in EHR, and no contraindications are present to provide treatment         Review of Systems  Sleep: no issues  Digestion: BM good  Stress: doing well           Provider reviewed plan for the acupuncture session, precautions and contraindications. Patient/guardian/hospital staff has given consent to treat with full understanding of what to expect during the session. Before acupuncture began, provider explained to the patient to communicate at any time if the procedure was causing discomfort past their tolerance level. Patient agreed to advise acupuncturist. The acupuncturist counseled the patient on the risks of acupuncture treatment including pain, infection, bleeding, and no relief of pain. The patient was positioned comfortably. There was no evidence of infection at the site of needle insertions.    Objective   Physical Exam              Acupuncture Treatment  Patient Position: Supine on a table  Acupuncture Needling: Yes  Needle Guage: 36 guage /.20/ Blue seirin, 40 guage /.16/ Red seirin  Body Points: With retention  Body Points - Bilateral: Du 20, R 6, ST 29, SP 8, SP 6, KD 3, SOBEIDA 3, P 6  Auricular Points: Yes  Auricular Points - Bilateral: shenmen, uterus  Electroacupuncture Used: No  Other Techniques Utilized: Mind-body exercise  Mind-Body Description: Guided meditation ([employer] Day of Transfer meditation)  Needle  Count In: 18  Needle Count Out: 18  Needle Retention Time (min): 25 minutes  Total Face to Face Time (min): 25 minutes              Assessment/Plan   Diagnoses and all orders for this visit:  Female infertility

## 2024-11-21 NOTE — DISCHARGE INSTRUCTIONS
[facility]  [address]. [place]  [address]  Tel: [phone]   Fax: [phone]    Home Going Instructions after Embryo Transfer:     After completing your embryo transfer please treat your body as though you are pregnant.  No smoking, alcohol, or recreational drugs.  Make sure you are taking a prenatal vitamin daily.   Continue all medications currently prescribed for embryo transfer until otherwise instructed by your physician, even if you experience spotting or bleeding and even if you have a negative home pregnancy test.   Medications to avoid in pregnancy: Advil, Motrin, Ibuprofen, Aleve, Excedrin, Ami-[place], Sudafed, and Pepto-Bismol. Avoid aspirin unless you are taking this daily at the direction of your physician.   Medications that are generally safe in pregnancy: Tylenol, Benadryl, Plain Robitussin, Zyrtec, Claritin, Pepcid, Tums, Rolaids, Mylanta, Nasonex.   Foods to avoid:   Seafood that is high in mercury; you can have canned tuna twice a week.   Deli meats, deli salads, hot dogs, and unpasteurized cheeses due to the risk of Listeria.  Activities to avoid:   No hot tubs, whirlpools, or saunas.  Avoid starting new exercise routines that you have not done previously.  Avoid lifting > 30 lbs.  No cleaning litter boxes.  No intercourse until you test for pregnancy.   You do not need to be on bed rest following the transfer. It is healthy, normal, and recommended to go about routine physical activity.   We advise against taking a home pregnancy test due to the possibility of false negative and false positive results.   You will test for pregnancy in approximately 10 days, at which point you will be about 4 weeks pregnant.   If blood work was drawn on the day of your transfer, you can expect a phone that day with the results of your bloodwork. We expect a progesterone level greater than or equal to 16. If you level is less than 16  we will adjust your progesterone dose and repeat your level in 2 days.     Jessica Browning    10:30 AM

## 2024-11-22 ENCOUNTER — APPOINTMENT (OUTPATIENT)
Dept: INTEGRATIVE MEDICINE | Facility: CLINIC | Age: 34
End: 2024-11-22
Payer: COMMERCIAL

## 2024-11-25 ENCOUNTER — SPECIALTY PHARMACY (OUTPATIENT)
Dept: PHARMACY | Facility: CLINIC | Age: 34
End: 2024-11-25

## 2024-11-25 ENCOUNTER — PHARMACY VISIT (OUTPATIENT)
Dept: PHARMACY | Facility: CLINIC | Age: 34
End: 2024-11-25
Payer: COMMERCIAL

## 2024-11-25 PROCEDURE — RXMED WILLOW AMBULATORY MEDICATION CHARGE

## 2024-11-30 ENCOUNTER — LAB (OUTPATIENT)
Dept: LAB | Facility: LAB | Age: 34
End: 2024-11-30
Payer: COMMERCIAL

## 2024-11-30 DIAGNOSIS — N97.9 FEMALE INFERTILITY: ICD-10-CM

## 2024-11-30 LAB — B-HCG SERPL-ACNC: 274 MIU/ML

## 2024-11-30 PROCEDURE — 84702 CHORIONIC GONADOTROPIN TEST: CPT

## 2024-11-30 PROCEDURE — 36415 COLL VENOUS BLD VENIPUNCTURE: CPT

## 2024-12-02 DIAGNOSIS — Z32.00 UNCONFIRMED PREGNANCY: Primary | ICD-10-CM

## 2024-12-02 DIAGNOSIS — Z32.00 PREGNANCY EXAMINATION OR TEST, PREGNANCY UNCONFIRMED: ICD-10-CM

## 2024-12-02 NOTE — PROGRESS NOTES
"Patient went to outpatient lab on  for initial HCG post-FET on .     Initial HC  Patient's ROBINA Provider: Geri Douglas MD  Infertility dx: Recurrent Pregnancy Loss  Achieved pregnancy by: Embryo transfer   Fertility medications used this cycle:  Estrace 6mg PO, 75mg ARTURO, 162mg ASA, Prednisone 10mg  LMP: Patient's last menstrual period was 10/29/2024  EGA based on (IUI date, ET ): embryo transfer date (EGA on  = 4 weeks, 1 day)    Updated G/P with this pregnancy:   OB HX:  OB History    Para Term  AB Living   5 1 1 0 4 1   SAB IAB Ectopic Multiple Live Births   3 1 0 0 1      # Outcome Date GA Lbr Jeffrey/2nd Weight Sex Type Anes PTL Lv   5 Term 23 38w2d 30:17 / 01:08 2.9 kg M Vag-Spont EPI  SOBEIDA      Complications: Uterine Atony, Hemorrhage   4 SAB 10/17/22 19w1d             Complications: Premature rupture of membranes   3 SAB 2019           2 2019     Biochemical      1 IAB 2011               Type & Screen: 6/3/2024  ABO: O  Rh: NEG  Antibody: NEG  History of miscarriage: Yes, SAB x 4   History of pelvic/abdominal surgeries: No  History of STDs/PID: No  Hx of ectopic: No  Hx of tubal disease/ blockage: No    Risk for ectopic: No      Current medications:     Current Outpatient Medications:     estradiol (Estrace) 2 mg tablet, Take 3 tablets (6 mg) by mouth once daily., Disp: 90 tablet, Rfl: 2    levothyroxine (Synthroid, Levoxyl) 25 mcg tablet, Take 1 tablet (25 mcg) by mouth once daily in the morning. Take before meals., Disp: 30 tablet, Rfl: 1    lidocaine-prilocaine (Emla) 2.5-2.5 % cream, Apply to treatment area 1 hour prior to injection., Disp: 30 g, Rfl: 2    needle, disp, 22 G 22 gauge x 1\" needle, Use as directed to inject Progesterone in Oil, Disp: 30 each, Rfl: 3    predniSONE (Deltasone) 10 mg tablet, Take 1 tablet (10 mg) by mouth once daily. Begin taking the day you start progesterone in oil., Disp: 30 tablet, Rfl: 2    PRENATAL 2-IRON-FOLIC " ACID-OM3 ORAL, Take by mouth., Disp: , Rfl:     progesterone 50 mg/mL injection, Inject 1.5 mL (75 mg) into the muscle once daily. 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 Team will reach out to patient with results and plan.   Discussed early pregnancy versus miscarriage versus ectopic. Precautions given.   Patient expresses understanding of plan and is agreeable.    IVON ORNELAS  2024  11:33 AM    [facility] PROVIDER NOTE - HCG REVIEW  Ultrasound and/or labs reviewed at [facility].     Results for orders placed or performed in visit on 24 (from the past 96 hours)   Human Chorionic Gonadotropin, Serum Quantitative   Result Value Ref Range    HCG, Beta-Quantitative 274 (H) <5 mIU/mL     *Note: Due to a large number of results and/or encounters for the requested time period, some results have not been displayed. A complete set of results can be found in Results Review.       Lab Results   Component Value Date    HCGQUANT 274 (H) 2024    HCGQUANT <3 2024    HCGQUANT 10,120 (A) 2023    HCGQUANT 1,693 (A) 2023    HCGQUANT 275 (A) 2023    HCGQUANT <3 2023    HCGQUANT <3 2022    HCGQUANT 1,205 (A) 2022       RTC in four days for HCG.   Orlando Kirby  2024  12:52 PM      Phone call to patient to discuss results of blood pregnancy test. Left detailed voicemail  relaying the following: Shared with patient that HCG level is 274 and they are 4 weeks and 1 days pregnant. Reviewed with patient that we are cautiously optimistic that this is a good level. Reviewed with patient to continue all current medications and that we will plan to repeat level in 4 days. Reviewed with patient to reach out to their primary OBGYN to schedule new OB visit at this time as patient needs to be seen by 9 weeks gestation.    IVON ORNELAS 12/02/24 1:03 PM

## 2024-12-03 ENCOUNTER — LAB (OUTPATIENT)
Dept: LAB | Facility: LAB | Age: 34
End: 2024-12-03
Payer: COMMERCIAL

## 2024-12-03 DIAGNOSIS — Z32.00 UNCONFIRMED PREGNANCY: ICD-10-CM

## 2024-12-03 DIAGNOSIS — Z32.01 POSITIVE BLOOD PREGNANCY TEST (HHS-HCC): Primary | ICD-10-CM

## 2024-12-03 LAB — B-HCG SERPL-ACNC: 672 MIU/ML

## 2024-12-03 PROCEDURE — 84702 CHORIONIC GONADOTROPIN TEST: CPT

## 2024-12-03 PROCEDURE — 36415 COLL VENOUS BLD VENIPUNCTURE: CPT

## 2024-12-03 NOTE — PROGRESS NOTES
"Patient going to outpatient lab for repeat HCG level post - FET.      Latest Reference Range & Units 24 11:48 24 07:36   HCG, Beta-Quantitative <5 mIU/mL 274 (H) 672 (H)   (H): Data is abnormally high    Patient's ROBINA Provider: Geri Douglas MD  Infertility dx: Recurrent Pregnancy Loss  Achieved pregnancy by: Embryo transfer   Fertility medications used this cycle:  Estrace 6mg PO, 75mg ARTURO, 162mg ASA, Prednisone 10mg  LMP: Patient's last menstrual period was 10/29/2024  EGA based on (IUI date, ET ): embryo transfer date (EGA on  = 4 weeks, 3 days)     Updated G/P with this pregnancy:   OB HX:                   OB History    Para Term  AB Living   5 1 1 0 4 1   SAB IAB Ectopic Multiple Live Births      3 1 0 0 1          # Outcome Date GA Lbr Jeffrey/2nd Weight Sex Type Anes PTL Lv   5 Term 23 38w2d 30:17 / 01:08 2.9 kg M Vag-Spont EPI   SOBEIDA      Complications: Uterine Atony, Hemorrhage   4 SAB 10/17/22 19w1d                    Complications: Premature rupture of membranes   3 SAB 2019                   2 SAB 2019         Biochemical         1 IAB 2011                         Type & Screen: 6/3/2024  ABO: O  Rh: NEG  Antibody: NEG  History of miscarriage: Yes, SAB x 4   History of pelvic/abdominal surgeries: No  History of STDs/PID: No  Hx of ectopic: No  Hx of tubal disease/ blockage: No     Risk for ectopic: No        Current medications:      Current Outpatient Medications:     estradiol (Estrace) 2 mg tablet, Take 3 tablets (6 mg) by mouth once daily., Disp: 90 tablet, Rfl: 2    levothyroxine (Synthroid, Levoxyl) 25 mcg tablet, Take 1 tablet (25 mcg) by mouth once daily in the morning. Take before meals., Disp: 30 tablet, Rfl: 1    lidocaine-prilocaine (Emla) 2.5-2.5 % cream, Apply to treatment area 1 hour prior to injection., Disp: 30 g, Rfl: 2    needle, disp, 22 G 22 gauge x 1\" needle, Use as directed to inject Progesterone in Oil, Disp: 30 each, Rfl: 3    " predniSONE (Deltasone) 10 mg tablet, Take 1 tablet (10 mg) by mouth once daily. Begin taking the day you start progesterone in oil., Disp: 30 tablet, Rfl: 2    PRENATAL 2-IRON-FOLIC ACID-OM3 ORAL, Take by mouth., Disp: , Rfl:     progesterone 50 mg/mL injection, Inject 1.5 mL (75 mg) into the muscle once daily. 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 Team will reach out to patient with results and plan.   Discussed early pregnancy versus miscarriage versus ectopic. Precautions given.   Patient expresses understanding of plan and is agreeable.    IVON ORNELAS RN 2024 11:29    [facility]  [facility]     Results for orders placed or performed in visit on 24 (from the past 96 hours)   (Lab Collect) Human Chorionic Gonadotropin, Serum Quantitative   Result Value Ref Range    HCG, Beta-Quantitative 672 (H) <5 mIU/mL     *Note: Due to a large number of results and/or encounters for the requested time period, some results have not been displayed. A complete set of results can be found in Results Review.       Lab Results   Component Value Date    HCGQUANT 672 (H) 2024    HCGQUANT 274 (H) 2024    HCGQUANT <3 2024    HCGQUANT 10,120 (A) 2023    HCGQUANT 1,693 (A) 2023    HCGQUANT 275 (A) 2023    HCGQUANT <3 2023     HCGQUANT <3 12/02/2022       RTC in ONE WEEK for HCG and  labs only .   Sherly Bryson  12/03/2024  11:37 AM      Placed call to patient to relay plan as documented above. Left detailed voicemail relaying plan. Encouraged patient to call back with any additional questions or concerns. Reviewed in voicemail that patient is to continue all medications at this time and that if she has not already we would encourage her to contact her OBGYN to establish care for once she is presumably graduated from our care.   IVON ORNELAS RN 12/03/2024 12:05

## 2024-12-05 ENCOUNTER — APPOINTMENT (OUTPATIENT)
Dept: INTEGRATIVE MEDICINE | Facility: CLINIC | Age: 34
End: 2024-12-05
Payer: COMMERCIAL

## 2024-12-09 ENCOUNTER — PHARMACY VISIT (OUTPATIENT)
Dept: PHARMACY | Facility: CLINIC | Age: 34
End: 2024-12-09
Payer: COMMERCIAL

## 2024-12-09 ENCOUNTER — SPECIALTY PHARMACY (OUTPATIENT)
Dept: PHARMACY | Facility: CLINIC | Age: 34
End: 2024-12-09

## 2024-12-09 ENCOUNTER — TELEPHONE (OUTPATIENT)
Dept: ENDOCRINOLOGY | Facility: CLINIC | Age: 34
End: 2024-12-09

## 2024-12-09 ENCOUNTER — LAB (OUTPATIENT)
Dept: LAB | Facility: LAB | Age: 34
End: 2024-12-09
Payer: COMMERCIAL

## 2024-12-09 DIAGNOSIS — O36.80X0 ENCOUNTER TO DETERMINE FETAL VIABILITY OF PREGNANCY, NOT APPLICABLE OR UNSPECIFIED FETUS: ICD-10-CM

## 2024-12-09 DIAGNOSIS — Z32.01 POSITIVE BLOOD PREGNANCY TEST (HHS-HCC): ICD-10-CM

## 2024-12-09 LAB — B-HCG SERPL-ACNC: 4329 MIU/ML

## 2024-12-09 PROCEDURE — RXMED WILLOW AMBULATORY MEDICATION CHARGE

## 2024-12-09 PROCEDURE — 84702 CHORIONIC GONADOTROPIN TEST: CPT

## 2024-12-09 PROCEDURE — 36415 COLL VENOUS BLD VENIPUNCTURE: CPT

## 2024-12-09 NOTE — PROGRESS NOTES
"Patient going to outpatient lab for repeat HCG level post - FET.   Initial hc  Hcg 12/3/24: 672  Hcg 24: 4,329  Patient's ROBINA Provider: Geri Douglas MD  Infertility dx: Recurrent Pregnancy Loss  Achieved pregnancy by: Embryo transfer   Fertility medications used this cycle:  Estrace 6mg PO, 75mg ARTURO, 162mg ASA, Prednisone 10mg  LMP: Patient's last menstrual period was 10/29/2024  EGA based on (IUI date, ET ): embryo transfer date 5W2D today 24     Updated G/P with this pregnancy:   OB HX:                                  OB History    Para Term  AB Living   5 1 1 0 4 1   SAB IAB Ectopic Multiple Live Births         3 1 0 0 1             # Outcome Date GA Lbr Jeffrey/2nd Weight Sex Type Anes PTL Lv   5 Term 23 38w2d 30:17 / 01:08 2.9 kg M Vag-Spont EPI   SOBEIDA      Complications: Uterine Atony, Hemorrhage   4 SAB 10/17/22 19w1d                    Complications: Premature rupture of membranes   3 SAB 2019                   2 SAB 2019         Biochemical         1 IAB 2011                         Type & Screen: 6/3/2024  ABO: O  Rh: NEG  Antibody: NEG  History of miscarriage: Yes, SAB x 4   History of pelvic/abdominal surgeries: No  History of STDs/PID: No  Hx of ectopic: No  Hx of tubal disease/ blockage: No     Risk for ectopic: No        Current medications:      Current Outpatient Medications:     estradiol (Estrace) 2 mg tablet, Take 3 tablets (6 mg) by mouth once daily., Disp: 90 tablet, Rfl: 2    levothyroxine (Synthroid, Levoxyl) 25 mcg tablet, Take 1 tablet (25 mcg) by mouth once daily in the morning. Take before meals., Disp: 30 tablet, Rfl: 1    lidocaine-prilocaine (Emla) 2.5-2.5 % cream, Apply to treatment area 1 hour prior to injection., Disp: 30 g, Rfl: 2    needle, disp, 22 G 22 gauge x 1\" needle, Use as directed to inject Progesterone in Oil, Disp: 30 each, Rfl: 3    predniSONE (Deltasone) 10 mg tablet, Take 1 tablet (10 mg) by mouth once daily. Begin " taking the day you start progesterone in oil., Disp: 30 tablet, Rfl: 2    PRENATAL 2-IRON-FOLIC ACID-OM3 ORAL, Take by mouth., Disp: , Rfl:     progesterone 50 mg/mL injection, Inject 1.5 mL (75 mg) into the muscle once daily. 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 Team will reach out to patient with results and plan.   Discussed early pregnancy versus miscarriage versus ectopic. Precautions given.   Patient expresses understanding of plan and is agreeable.      CHITO AMEZCUA on 24 at 12:07 PM.      [facility] PROVIDER NOTE - HCG REVIEW  Ultrasound and/or labs reviewed at [facility].     Results for orders placed or performed in visit on 24 (from the past 96 hours)   (Lab Collect) Human Chorionic Gonadotropin, Serum Quantitative   Result Value Ref Range    HCG, Beta-Quantitative 4,329 (H) <5 mIU/mL     *Note: Due to a large number of results and/or encounters for the requested time period, some results have not been displayed. A complete set of results can be found in Results Review.       Lab Results   Component Value Date    HCGQUANT 4,329 (H) 2024    HCGQUANT 672 (H) 2024    HCGQUANT 274 (H) 2024    HCGQUANT <3 2024    HCGQUANT 10,120 (A) 2023    HCGQUANT 1,693 (A) 2023    HCGQUANT 275 (A) 2023    HCGQUANT <3 2023       RTC in AT APPROX 6 weeks 5 DAYS  GESTATION for OB scan and  no labs .   Sherly Bryson  12/09/2024  3:32 PM     message sent to patient with plan. Patient to call  to schedule for ob scan on 12/19.   CHITO AMEZCUA on 12/9/24 at 3:35 PM.            20

## 2024-12-09 NOTE — TELEPHONE ENCOUNTER
Returned patient's call. Patient will be out by a lab today and wants to get her hcg drawn now instead of tomorrow. Added to noon huddle for today to review level and took out of tomorrow's schedule.   CHITO AMEZCUA on 12/9/24 at 9:34 AM.

## 2024-12-10 ENCOUNTER — APPOINTMENT (OUTPATIENT)
Dept: ENDOCRINOLOGY | Facility: CLINIC | Age: 34
End: 2024-12-10
Payer: COMMERCIAL

## 2024-12-12 ENCOUNTER — APPOINTMENT (OUTPATIENT)
Dept: INTEGRATIVE MEDICINE | Facility: CLINIC | Age: 34
End: 2024-12-12
Payer: COMMERCIAL

## 2024-12-13 ENCOUNTER — ALLIED HEALTH (OUTPATIENT)
Dept: INTEGRATIVE MEDICINE | Facility: CLINIC | Age: 34
End: 2024-12-13

## 2024-12-13 DIAGNOSIS — O09.811 PREGNANCY RESULTING FROM IN VITRO FERTILIZATION IN FIRST TRIMESTER (HHS-HCC): Primary | ICD-10-CM

## 2024-12-13 PROCEDURE — 97810 ACUP 1/> WO ESTIM 1ST 15 MIN: CPT | Performed by: ACUPUNCTURIST

## 2024-12-13 PROCEDURE — 97811 ACUP 1/> W/O ESTIM EA ADD 15: CPT | Performed by: ACUPUNCTURIST

## 2024-12-13 NOTE — PROGRESS NOTES
"Subjective:   Patient ID:  Kaitlynn Chauhan is a 36 y.o. male is a new patient who presents for evaluation of Abnormal ECG and pvcs  Pt states he feels like he has been having heart palpations for the last couple of weeks, haS been getting worse and more frequent over the last few days.  Pt denies any chest pain, dizziness, lightheadedness or shortness of breath associated with the palpitations. 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     EKG: SR     HPI:   Palpitations lasts few seconds, feels heart skips the beat throughout the day. Every 3, 5 min happening every day. Patient noticed it increasing after starting beta 2 agonist. Patent under stress during fellowship interviews.   Patient does not exercise but very active.  No chest pain, Orthopnea, PND of heart failure symptoms.       There is no problem list on file for this patient.    /78   Pulse 66   Ht 6' 2" (1.88 m)   Wt 100.4 kg (221 lb 3.7 oz)   BMI 28.40 kg/m²   Body mass index is 28.4 kg/m².  CrCl cannot be calculated (Patient's most recent lab result is older than the maximum 7 days allowed.).    Lab Results   Component Value Date     09/21/2017    K 4.1 09/21/2017     09/21/2017    CO2 24 09/21/2017    BUN 12 09/21/2017    CREATININE 1.0 09/21/2017    GLU 85 09/21/2017    AST 20 09/21/2017    ALT 22 09/21/2017    ALBUMIN 4.0 09/21/2017    PROT 7.6 09/21/2017    BILITOT 1.0 09/21/2017    WBC 6.75 09/21/2017    HGB 15.6 09/21/2017    HCT 47.2 09/21/2017    MCV 83 09/21/2017     09/21/2017    TSH 1.494 09/21/2017    CHOL 182 09/21/2017    HDL 52 09/21/2017    LDLCALC 102.6 09/21/2017    TRIG 137 09/21/2017       Current Outpatient Medications   Medication Sig    albuterol 90 mcg/actuation inhaler Inhale 2 puffs into the lungs every 6 (six) hours as needed for Wheezing.    ALBUTEROL SULFATE INHL Inhale into the lungs daily as needed.  " Acupuncture Visit:     Subjective   Patient ID: Elidia Lindsay is a 34 y.o. female who presents for Pregnancy Support    Pregnancy Support:  Overall she is doing well.  Feeling a lot of fatigue.  No nausea.  Lots of breast tenderness.  No constipation.  She is about 5 weeks pregnant from her transfer on 11/21.     Supplements: This is Needed Prenatal  Medications: estrogen patches, progesterone, prednisone, ASA        Session Information  Is this acupuncture treatment being billed to the patient's insurance company: No  Visit Type: Follow-up visit  Medical History Reviewed: I have reviewed pertinent medical history in EHR, and no contraindications are present to provide treatment         Review of Systems  Sleep: overall  Digestion: BM good  Stress: overall good.            Provider reviewed plan for the acupuncture session, precautions and contraindications. Patient/guardian/hospital staff has given consent to treat with full understanding of what to expect during the session. Before acupuncture began, provider explained to the patient to communicate at any time if the procedure was causing discomfort past their tolerance level. Patient agreed to advise acupuncturist. The acupuncturist counseled the patient on the risks of acupuncture treatment including pain, infection, bleeding, and no relief of pain. The patient was positioned comfortably. There was no evidence of infection at the site of needle insertions.    Objective   Physical Exam              Acupuncture Treatment  Patient Position: Supine on a table  Acupuncture Needling: Yes  Needle Guage: 36 guage /.20/ Blue seirin  Body Points: With retention  Body Points - Bilateral: Du 20, sishencong, P 6, ST 36, KD 3, KD 7, SP 4  Auricular Points: No  Electroacupuncture Used: No  Other Techniques Utilized: Ear seeds, TDP Lamp  Ear Seeds: Stainless steel (shenmen)  TDP Lamp Descripton: feet  Needle Count In: 15  Needle Count Out: 15  Needle Retention Time (min): 25     fluticasone-salmeterol 232-14 mcg/actuation AePB Inhale into the lungs once daily.     No current facility-administered medications for this visit.        Review of Systems   Constitution: Negative for chills, decreased appetite, weakness, malaise/fatigue, night sweats, weight gain and weight loss.   Eyes: Negative for blurred vision, double vision, visual disturbance and visual halos.   Cardiovascular: Positive for palpitations. Negative for chest pain, claudication, cyanosis, dyspnea on exertion, irregular heartbeat, leg swelling, near-syncope, orthopnea, paroxysmal nocturnal dyspnea and syncope.   Respiratory: Negative for cough, hemoptysis, snoring, sputum production and wheezing. Shortness of breath: mild persitent asthma flare ups 2 times a month with change of season.    Endocrine: Negative for cold intolerance, heat intolerance, polydipsia and polyphagia.   Hematologic/Lymphatic: Negative for adenopathy and bleeding problem. Does not bruise/bleed easily.   Skin: Negative for flushing, itching, poor wound healing and rash.   Musculoskeletal: Negative for arthritis, back pain, falls, gout, joint pain, joint swelling, muscle cramps, muscle weakness, myalgias, neck pain and stiffness.   Gastrointestinal: Negative for bloating, abdominal pain, anorexia, diarrhea, dysphagia, excessive appetite, flatus, hematemesis, jaundice, melena and nausea.   Genitourinary: Negative for hesitancy and incomplete emptying.   Neurological: Negative for aphonia, brief paralysis, difficulty with concentration, disturbances in coordination, excessive daytime sleepiness, dizziness, focal weakness, light-headedness and loss of balance.   Psychiatric/Behavioral: Negative for altered mental status, depression, hallucinations, hypervigilance, memory loss, substance abuse and suicidal ideas. The patient does not have insomnia and is not nervous/anxious.        Objective:   Physical Exam   Constitutional: He is oriented to person,  minutes  Total Face to Face Time (min): 25 minutes              Assessment/Plan   Diagnoses and all orders for this visit:  Pregnancy resulting from in vitro fertilization in first trimester ([facility])       place, and time. He appears well-developed and well-nourished. No distress.   HENT:   Head: Normocephalic and atraumatic.   Nose: Nose normal.   Mouth/Throat: No oropharyngeal exudate.   Eyes: Conjunctivae and EOM are normal. Pupils are equal, round, and reactive to light. Right eye exhibits no discharge. Left eye exhibits no discharge. No scleral icterus.   Neck: Normal range of motion. Neck supple. No JVD present. No tracheal deviation present. No thyromegaly present.   Cardiovascular: Normal rate, regular rhythm, normal heart sounds and intact distal pulses. Exam reveals no gallop and no friction rub.   No murmur heard.  Pulmonary/Chest: Effort normal and breath sounds normal. No stridor. No respiratory distress. He has no wheezes. He has no rales. He exhibits no tenderness.   Abdominal: Soft. Bowel sounds are normal. He exhibits no distension and no mass. There is no tenderness.   Musculoskeletal: He exhibits no edema or tenderness.   Lymphadenopathy:     He has no cervical adenopathy.   Neurological: He is alert and oriented to person, place, and time. He displays normal reflexes. No cranial nerve deficit. He exhibits normal muscle tone. Coordination normal.   Skin: Skin is warm. No rash noted. He is not diaphoretic. No erythema. No pallor.   Psychiatric: He has a normal mood and affect. His behavior is normal. Judgment and thought content normal.       Assessment:     1. Palpitations    2. Skipped beats    3. Excessive coffee consumption        Plan:   Kaitlynn was seen today for abnormal ecg and pvcs.    Diagnoses and all orders for this visit:    Palpitations  -     IN OFFICE EKG 12-LEAD (to Muse)  -     Holter monitor - 48 hour; Future  -     TSH; Future  -     Comprehensive metabolic panel; Future  -     Magnesium; Future    Skipped beats    Excessive coffee consumption      Suspect PAC's or PVC's. Discussed mechanism and [employer] ed to reduce caffeine intake.   Will discuss treatment after holter and  electrolyte evaluation.   Counseled on importance of heart healthy diet low in saturated and trans fat and salt as well gradually starting a regular aerobic exercise regimen with goal of 30min 5x/week. Recommend BP diary. Call if systolic BP > 130 mmHg on checking repeatedly      RTC

## 2024-12-16 DIAGNOSIS — E03.8 OTHER SPECIFIED HYPOTHYROIDISM: ICD-10-CM

## 2024-12-16 RX ORDER — LEVOTHYROXINE SODIUM 25 UG/1
25 TABLET ORAL
Qty: 30 TABLET | Refills: 1 | Status: SHIPPED | OUTPATIENT
Start: 2024-12-16 | End: 2025-02-14

## 2024-12-19 ENCOUNTER — OFFICE VISIT (OUTPATIENT)
Dept: ENDOCRINOLOGY | Facility: CLINIC | Age: 34
End: 2024-12-19
Payer: COMMERCIAL

## 2024-12-19 ENCOUNTER — ANCILLARY PROCEDURE (OUTPATIENT)
Dept: ENDOCRINOLOGY | Facility: CLINIC | Age: 34
End: 2024-12-19
Payer: COMMERCIAL

## 2024-12-19 ENCOUNTER — ALLIED HEALTH (OUTPATIENT)
Dept: INTEGRATIVE MEDICINE | Facility: CLINIC | Age: 34
End: 2024-12-19

## 2024-12-19 ENCOUNTER — APPOINTMENT (OUTPATIENT)
Dept: INTEGRATIVE MEDICINE | Facility: CLINIC | Age: 34
End: 2024-12-19
Payer: COMMERCIAL

## 2024-12-19 DIAGNOSIS — O36.80X0 ENCOUNTER TO DETERMINE FETAL VIABILITY OF PREGNANCY, NOT APPLICABLE OR UNSPECIFIED FETUS: ICD-10-CM

## 2024-12-19 DIAGNOSIS — O36.80X0 ENCOUNTER TO DETERMINE FETAL VIABILITY OF PREGNANCY, SINGLE OR UNSPECIFIED FETUS: Primary | ICD-10-CM

## 2024-12-19 DIAGNOSIS — E03.9 HYPOTHYROIDISM, UNSPECIFIED TYPE: ICD-10-CM

## 2024-12-19 DIAGNOSIS — O09.811 PREGNANCY RESULTING FROM IN VITRO FERTILIZATION IN FIRST TRIMESTER (HHS-HCC): Primary | ICD-10-CM

## 2024-12-19 LAB — TSH SERPL-ACNC: 0.88 MIU/L (ref 0.44–3.98)

## 2024-12-19 PROCEDURE — 97810 ACUP 1/> WO ESTIM 1ST 15 MIN: CPT | Performed by: ACUPUNCTURIST

## 2024-12-19 PROCEDURE — 76817 TRANSVAGINAL US OBSTETRIC: CPT | Performed by: STUDENT IN AN ORGANIZED HEALTH CARE EDUCATION/TRAINING PROGRAM

## 2024-12-19 PROCEDURE — 99212 OFFICE O/P EST SF 10 MIN: CPT | Mod: 25

## 2024-12-19 PROCEDURE — 76817 TRANSVAGINAL US OBSTETRIC: CPT

## 2024-12-19 PROCEDURE — 84443 ASSAY THYROID STIM HORMONE: CPT

## 2024-12-19 PROCEDURE — 97811 ACUP 1/> W/O ESTIM EA ADD 15: CPT | Performed by: ACUPUNCTURIST

## 2024-12-19 NOTE — PROGRESS NOTES
Acupuncture Visit:     Subjective   Patient ID: Elidia Lindsay is a 34 y.o. female who presents for Pregnancy Support    Pregnancy Support:  She is almost 7 weeks pregnant.  She is having an ultrasound today.  She started to get some nausea in the evening around 4-5PM.     Supplements: This is Needed Prenatal  Medications: estrogen patches, progesterone, prednisone, ASA        Session Information  Is this acupuncture treatment being billed to the patient's insurance company: No  Visit Type: Follow-up visit  Medical History Reviewed: I have reviewed pertinent medical history in EHR, and no contraindications are present to provide treatment         Review of Systems  Sleep: overall  Digestion: BM good  Stress: overall good.            Provider reviewed plan for the acupuncture session, precautions and contraindications. Patient/guardian/hospital staff has given consent to treat with full understanding of what to expect during the session. Before acupuncture began, provider explained to the patient to communicate at any time if the procedure was causing discomfort past their tolerance level. Patient agreed to advise acupuncturist. The acupuncturist counseled the patient on the risks of acupuncture treatment including pain, infection, bleeding, and no relief of pain. The patient was positioned comfortably. There was no evidence of infection at the site of needle insertions.    Objective   Physical Exam              Acupuncture Treatment  Patient Position: Supine on a table  Acupuncture Needling: Yes  Needle Guage: 36 guage /.20/ Blue seirin  Body Points: With retention  Body Points - Bilateral: Du 20, sishencong, P 6, ST 36, KD 3, KD 7, SP 4  Auricular Points: No  Electroacupuncture Used: No  Other Techniques Utilized: Ear seeds, TDP Lamp  Ear Seeds: Stainless steel (shenmen)  TDP Lamp Descripton: feet  Needle Count In: 15  Needle Count Out: 15  Needle Retention Time (min): 25 minutes  Total Face to Face Time (min): 25  minutes              Assessment/Plan   Diagnoses and all orders for this visit:  Pregnancy resulting from in vitro fertilization in first trimester ([facility])

## 2024-12-19 NOTE — PROGRESS NOTES
Visit Type: In Person    OB Scan    Ectopic risk factor: no  PGTA/PGTM: no  Blood type: O negative  Method of Conception: Frozen Embryo transfer  on 11-  Meds: PNV daily, ARTURO 75 mg IM daily, Vivelle Patches 0.3 mg to change every other day,  mg daily, Synthroid 25 mcg daily, & Prednisone 10 mg daily    Reviewed results from OB ultrasound today and results reviewed with pt as follows:     OB Scan results:   Dating Date Details Gest. age GEOVANNY  Conception via 6 day FET on 11/21/2024: 6 w + 6 d, GEOVANNY 8/8/2025  Stated GEOVANNY 6 w + 6 d, GEOVANNY 8/8/2025  U/S 12/19/2024 based upon CRL 7 w + 0 d, GEOVANNY 8/7/2025  Impression Early single intrauterine pregnancy with cardiac activity present. Size is consistent with dating  provided.  Follow-up Plan: Release to OB Provider  Assessment Gestational sac: visualized  Yolk sac: visualized  Embryo: visualized  Cardiac activity: present  Early placenta: circumferential  YS 2.3 mm  CRL 8.0 mm 7w 0d    bpm  Size = dates  JESSICA measures 10.3 x 16.4 x 3.6 mm    Detailed discussion with patient about her scan results, pregnancy, and next steps:    Plan:   Reviewed medications in detail. She will continue PNV, ARTURO, Vivelle Patches, ASA, Synthroid, & Prednisone.   Reviewed supplemental progesterone, route and dose, reviewed dates of cessation. Last day to take Vivelle Patches, ARTURO, & Prednisone is on 1-. Stop ASA per OB provider.   Discussed with patient any pregnancy concerns and discussed establishing care with an OB. Seeing MFM 12-.  Will provide recommendations if she desires.   Advised to call with bleeding, pain or concerns. Denies any pain, bleeding, or cramping.   TSH Level today is normal at 0.88- continue current Synthroid dose  Discussed JESSICA- etiology, bleeding patterns, will call with any bleeding.     Ultrasound results and Plan of care reviewed with Dr. Lulu Salas, MD Sally Ochoa, CNP 12/19/24 4:58 PM

## 2024-12-20 ENCOUNTER — SPECIALTY PHARMACY (OUTPATIENT)
Dept: PHARMACY | Facility: CLINIC | Age: 34
End: 2024-12-20

## 2024-12-20 ENCOUNTER — PHARMACY VISIT (OUTPATIENT)
Dept: PHARMACY | Facility: CLINIC | Age: 34
End: 2024-12-20
Payer: COMMERCIAL

## 2024-12-30 ENCOUNTER — INITIAL PRENATAL (OUTPATIENT)
Dept: MATERNAL FETAL MEDICINE | Facility: CLINIC | Age: 34
End: 2024-12-30
Payer: COMMERCIAL

## 2024-12-30 ENCOUNTER — LAB (OUTPATIENT)
Dept: LAB | Facility: LAB | Age: 34
End: 2024-12-30
Payer: COMMERCIAL

## 2024-12-30 VITALS — WEIGHT: 117 LBS | DIASTOLIC BLOOD PRESSURE: 78 MMHG | SYSTOLIC BLOOD PRESSURE: 119 MMHG | BODY MASS INDEX: 22.11 KG/M2

## 2024-12-30 DIAGNOSIS — O09.811 PREGNANCY RESULTING FROM IN VITRO FERTILIZATION IN FIRST TRIMESTER (HHS-HCC): Primary | ICD-10-CM

## 2024-12-30 DIAGNOSIS — O26.20 RECURRENT PREGNANCY LOSS, CURRENTLY PREGNANT (HHS-HCC): ICD-10-CM

## 2024-12-30 DIAGNOSIS — Z67.91 RH NEGATIVE STATUS DURING PREGNANCY IN FIRST TRIMESTER (HHS-HCC): ICD-10-CM

## 2024-12-30 DIAGNOSIS — Z3A.08 8 WEEKS GESTATION OF PREGNANCY (HHS-HCC): ICD-10-CM

## 2024-12-30 DIAGNOSIS — O09.299 PREGNANCY WITH POOR OBSTETRIC HISTORY (HHS-HCC): ICD-10-CM

## 2024-12-30 DIAGNOSIS — O26.891 RH NEGATIVE STATUS DURING PREGNANCY IN FIRST TRIMESTER (HHS-HCC): ICD-10-CM

## 2024-12-30 DIAGNOSIS — O34.31 CERVICAL INSUFFICIENCY DURING PREGNANCY IN FIRST TRIMESTER, ANTEPARTUM: ICD-10-CM

## 2024-12-30 DIAGNOSIS — E03.8 SUBCLINICAL HYPOTHYROIDISM: ICD-10-CM

## 2024-12-30 LAB
ABO GROUP (TYPE) IN BLOOD: NORMAL
ALBUMIN SERPL BCP-MCNC: 4 G/DL (ref 3.4–5)
ALP SERPL-CCNC: 63 U/L (ref 33–110)
ALT SERPL W P-5'-P-CCNC: 19 U/L (ref 7–45)
ANION GAP SERPL CALC-SCNC: 14 MMOL/L (ref 10–20)
ANTIBODY SCREEN: NORMAL
AST SERPL W P-5'-P-CCNC: 20 U/L (ref 9–39)
BILIRUB SERPL-MCNC: 0.6 MG/DL (ref 0–1.2)
BUN SERPL-MCNC: 19 MG/DL (ref 6–23)
CALCIUM SERPL-MCNC: 9.4 MG/DL (ref 8.6–10.3)
CHLORIDE SERPL-SCNC: 106 MMOL/L (ref 98–107)
CO2 SERPL-SCNC: 23 MMOL/L (ref 21–32)
CREAT SERPL-MCNC: 0.85 MG/DL (ref 0.5–1.05)
CREAT UR-MCNC: 139 MG/DL (ref 20–320)
EGFRCR SERPLBLD CKD-EPI 2021: >90 ML/MIN/1.73M*2
ERYTHROCYTE [DISTWIDTH] IN BLOOD BY AUTOMATED COUNT: 13.1 % (ref 11.5–14.5)
EST. AVERAGE GLUCOSE BLD GHB EST-MCNC: 82 MG/DL
GLUCOSE SERPL-MCNC: 87 MG/DL (ref 74–99)
HBA1C MFR BLD: 4.5 %
HBV SURFACE AG SERPL QL IA: NONREACTIVE
HCT VFR BLD AUTO: 44.7 % (ref 36–46)
HGB BLD-MCNC: 15.7 G/DL (ref 12–16)
HIV 1+2 AB+HIV1 P24 AG SERPL QL IA: NONREACTIVE
LDH SERPL L TO P-CCNC: 193 U/L (ref 84–246)
MCH RBC QN AUTO: 31.3 PG (ref 26–34)
MCHC RBC AUTO-ENTMCNC: 35.1 G/DL (ref 32–36)
MCV RBC AUTO: 89 FL (ref 80–100)
NRBC BLD-RTO: 0 /100 WBCS (ref 0–0)
PLATELET # BLD AUTO: 212 X10*3/UL (ref 150–450)
POTASSIUM SERPL-SCNC: 4.3 MMOL/L (ref 3.5–5.3)
PROT SERPL-MCNC: 6.7 G/DL (ref 6.4–8.2)
PROT UR-ACNC: 17 MG/DL (ref 5–24)
PROT/CREAT UR: 0.12 MG/MG CREAT (ref 0–0.17)
RBC # BLD AUTO: 5.01 X10*6/UL (ref 4–5.2)
REFLEX ADDED, ANEMIA PANEL: NORMAL
RH FACTOR (ANTIGEN D): NORMAL
RUBV IGG SERPL IA-ACNC: 1.2 IA
RUBV IGG SERPL QL IA: POSITIVE
SODIUM SERPL-SCNC: 139 MMOL/L (ref 136–145)
TREPONEMA PALLIDUM IGG+IGM AB [PRESENCE] IN SERUM OR PLASMA BY IMMUNOASSAY: NONREACTIVE
TSH SERPL-ACNC: 1.4 MIU/L (ref 0.44–3.98)
URATE SERPL-MCNC: 4.1 MG/DL (ref 2.3–6.7)
WBC # BLD AUTO: 12.2 X10*3/UL (ref 4.4–11.3)

## 2024-12-30 PROCEDURE — 87086 URINE CULTURE/COLONY COUNT: CPT | Performed by: OBSTETRICS & GYNECOLOGY

## 2024-12-30 PROCEDURE — 99214 OFFICE O/P EST MOD 30 MIN: CPT | Performed by: OBSTETRICS & GYNECOLOGY

## 2024-12-30 PROCEDURE — 86901 BLOOD TYPING SEROLOGIC RH(D): CPT

## 2024-12-30 PROCEDURE — 86900 BLOOD TYPING SEROLOGIC ABO: CPT

## 2024-12-30 PROCEDURE — 82570 ASSAY OF URINE CREATININE: CPT | Mod: AHULAB | Performed by: OBSTETRICS & GYNECOLOGY

## 2024-12-30 PROCEDURE — 84550 ASSAY OF BLOOD/URIC ACID: CPT

## 2024-12-30 PROCEDURE — 86780 TREPONEMA PALLIDUM: CPT

## 2024-12-30 PROCEDURE — 86317 IMMUNOASSAY INFECTIOUS AGENT: CPT

## 2024-12-30 PROCEDURE — 84443 ASSAY THYROID STIM HORMONE: CPT

## 2024-12-30 PROCEDURE — 83036 HEMOGLOBIN GLYCOSYLATED A1C: CPT

## 2024-12-30 PROCEDURE — 83615 LACTATE (LD) (LDH) ENZYME: CPT

## 2024-12-30 PROCEDURE — 80053 COMPREHEN METABOLIC PANEL: CPT

## 2024-12-30 PROCEDURE — 87340 HEPATITIS B SURFACE AG IA: CPT

## 2024-12-30 PROCEDURE — 86850 RBC ANTIBODY SCREEN: CPT

## 2024-12-30 PROCEDURE — 87491 CHLMYD TRACH DNA AMP PROBE: CPT | Performed by: OBSTETRICS & GYNECOLOGY

## 2024-12-30 PROCEDURE — 87389 HIV-1 AG W/HIV-1&-2 AB AG IA: CPT

## 2024-12-30 PROCEDURE — 85027 COMPLETE CBC AUTOMATED: CPT

## 2024-12-30 RX ORDER — ASPIRIN 81 MG/1
162 TABLET ORAL DAILY
COMMUNITY

## 2024-12-30 ASSESSMENT — EDINBURGH POSTNATAL DEPRESSION SCALE (EPDS)
I HAVE BEEN ABLE TO LAUGH AND SEE THE FUNNY SIDE OF THINGS: AS MUCH AS I ALWAYS COULD
I HAVE BEEN ANXIOUS OR WORRIED FOR NO GOOD REASON: NO, NOT AT ALL
THE THOUGHT OF HARMING MYSELF HAS OCCURRED TO ME: NEVER
I HAVE FELT SAD OR MISERABLE: NO, NOT AT ALL
I HAVE BLAMED MYSELF UNNECESSARILY WHEN THINGS WENT WRONG: NO, NEVER
I HAVE BEEN SO UNHAPPY THAT I HAVE HAD DIFFICULTY SLEEPING: NOT AT ALL
I HAVE FELT SCARED OR PANICKY FOR NO GOOD REASON: NO, NOT AT ALL
I HAVE BEEN SO UNHAPPY THAT I HAVE BEEN CRYING: NO, NEVER
I HAVE LOOKED FORWARD WITH ENJOYMENT TO THINGS: AS MUCH AS I EVER DID
TOTAL SCORE: 0
THINGS HAVE BEEN GETTING ON TOP OF ME: NO, I HAVE BEEN COPING AS WELL AS EVER

## 2024-12-30 ASSESSMENT — ENCOUNTER SYMPTOMS
GASTROINTESTINAL NEGATIVE: 0
ENDOCRINE NEGATIVE: 0
CONSTITUTIONAL NEGATIVE: 0
CARDIOVASCULAR NEGATIVE: 0
RESPIRATORY NEGATIVE: 0
NEUROLOGICAL NEGATIVE: 0
HEMATOLOGIC/LYMPHATIC NEGATIVE: 0
PSYCHIATRIC NEGATIVE: 0
MUSCULOSKELETAL NEGATIVE: 0
EYES NEGATIVE: 0
ALLERGIC/IMMUNOLOGIC NEGATIVE: 0

## 2024-12-30 NOTE — PROGRESS NOTES
Elidia Lindsay is a 34 y.o.  @ 8w4d presenting for an [facility] new OB visit. She is well known to me from her last pregnancy, she delivered in 2023. She is without complaints today. ROS is negative.      Issues:   IVF conception  Recurrent pregnancy loss with negative workup  Cervical insufficiency- midtrimester loss (D&E) after PPROM at 16+ weeks. Had cerclage last pregnancy  History of gestational diabetes  History of term PEC last pregnancy  Subclinical hypothyroidism  Anxiety  Rh negative    OBHx: 3 early pregnancy losses, midtrimester loss (PPROM, D&E), term   GynHx: remote history of abnormal paps, last pap  BENJAMIN  MedHx: POTS, anxiety, depression  SurgHx: D&E, chin implant, rhinoplasty  Meds: PNV, synthroid, IVF meds  All: NKDA  FamHx: mother had a child with an ONTD   SocHx: no toxic habits    O: Visit Vitals  /78   Wt 53.1 kg (117 lb)   BMI 22.11 kg/m²   OB Status Pregnant   Smoking Status Never   BSA 1.51 m²      Gen- NAD  CV/Pulm- regular rate, clear  Abd- soft, nontender  Ext- symmetrical, nontender  Pelvic- deferred, recent exams with ROBINA    Bedside sono- viable willard IUP     A/P: 35yo  @ 8w4d by IVF dating presenting for a new OB.   Cervical insufficiency with prior midtrimester PPROM  - Will follow same plan of care as last pregnancy with history indicated cerclage at 12 weeks.     2. IVF pregnancy  - Plan targeted anatomy scan and serial 3rd trimester growth scans.   - NSTs to begin at 28 weeks for patient anxiety as before.  - Fetal echo if heart views not optimal by [facility].     3. Recurrent pregnancy loss  - Negative APLS workup.   - Continue baby ASA.   - Currently on prednisone per ROBINA, will stop at 11+ weeks     4. Subclinical hypothyroidism  - Euthyroid on synthroid 25mcg, repeat labs with new OB labs today.     5. Hx of GDM and PEC  - Early screening for GDM today with A1c.   - Baseline PEC labs ordered.     6. Rh negative  - For Rhogam at 28 weeks    7.  PNC  - RTC in 3 weeks for and PNV.   - Will schedule cerclage in 4 weeks.   - New OB labs ordered.   - Myriad ordered, will have drawn at 10 weeks.     Marguerite Srinivasan MD  Maternal Fetal Medicine  Director of Fetal Intervention

## 2024-12-31 LAB
BACTERIA UR CULT: NORMAL
C TRACH RRNA SPEC QL NAA+PROBE: NEGATIVE
N GONORRHOEA DNA SPEC QL PROBE+SIG AMP: NEGATIVE

## 2025-01-02 ENCOUNTER — APPOINTMENT (OUTPATIENT)
Dept: INTEGRATIVE MEDICINE | Facility: CLINIC | Age: 35
End: 2025-01-02
Payer: COMMERCIAL

## 2025-01-09 ENCOUNTER — LAB (OUTPATIENT)
Dept: LAB | Facility: LAB | Age: 35
End: 2025-01-09
Payer: COMMERCIAL

## 2025-01-09 ENCOUNTER — APPOINTMENT (OUTPATIENT)
Dept: ENDOCRINOLOGY | Facility: CLINIC | Age: 35
End: 2025-01-09
Payer: COMMERCIAL

## 2025-01-10 ENCOUNTER — APPOINTMENT (OUTPATIENT)
Dept: INTEGRATIVE MEDICINE | Facility: CLINIC | Age: 35
End: 2025-01-10
Payer: COMMERCIAL

## 2025-01-17 ENCOUNTER — APPOINTMENT (OUTPATIENT)
Dept: INTEGRATIVE MEDICINE | Facility: CLINIC | Age: 35
End: 2025-01-17
Payer: COMMERCIAL

## 2025-01-20 ENCOUNTER — APPOINTMENT (OUTPATIENT)
Dept: RADIOLOGY | Facility: CLINIC | Age: 35
End: 2025-01-20
Payer: COMMERCIAL

## 2025-01-21 ENCOUNTER — ROUTINE PRENATAL (OUTPATIENT)
Dept: MATERNAL FETAL MEDICINE | Facility: CLINIC | Age: 35
End: 2025-01-21
Payer: COMMERCIAL

## 2025-01-21 ENCOUNTER — HOSPITAL ENCOUNTER (OUTPATIENT)
Dept: RADIOLOGY | Facility: CLINIC | Age: 35
Discharge: HOME | End: 2025-01-21
Payer: COMMERCIAL

## 2025-01-21 VITALS — BODY MASS INDEX: 22.3 KG/M2 | DIASTOLIC BLOOD PRESSURE: 78 MMHG | WEIGHT: 118 LBS | SYSTOLIC BLOOD PRESSURE: 119 MMHG

## 2025-01-21 DIAGNOSIS — O09.299 HISTORY OF GESTATIONAL DIABETES IN PRIOR PREGNANCY, CURRENTLY PREGNANT (HHS-HCC): ICD-10-CM

## 2025-01-21 DIAGNOSIS — O26.891 RH NEGATIVE STATUS DURING PREGNANCY IN FIRST TRIMESTER (HHS-HCC): ICD-10-CM

## 2025-01-21 DIAGNOSIS — O09.291: ICD-10-CM

## 2025-01-21 DIAGNOSIS — Z67.91 RH NEGATIVE STATUS DURING PREGNANCY IN FIRST TRIMESTER (HHS-HCC): ICD-10-CM

## 2025-01-21 DIAGNOSIS — O09.299 HISTORY OF PRE-ECLAMPSIA IN PRIOR PREGNANCY, CURRENTLY PREGNANT (HHS-HCC): ICD-10-CM

## 2025-01-21 DIAGNOSIS — O09.811 PREGNANCY RESULTING FROM IN VITRO FERTILIZATION IN FIRST TRIMESTER (HHS-HCC): ICD-10-CM

## 2025-01-21 DIAGNOSIS — Z32.00 PREGNANCY EXAMINATION OR TEST, PREGNANCY UNCONFIRMED: ICD-10-CM

## 2025-01-21 DIAGNOSIS — Z3A.11 11 WEEKS GESTATION OF PREGNANCY (HHS-HCC): ICD-10-CM

## 2025-01-21 DIAGNOSIS — Z86.32 HISTORY OF GESTATIONAL DIABETES IN PRIOR PREGNANCY, CURRENTLY PREGNANT (HHS-HCC): ICD-10-CM

## 2025-01-21 DIAGNOSIS — O34.31 CERVICAL INSUFFICIENCY DURING PREGNANCY IN FIRST TRIMESTER, ANTEPARTUM: Primary | ICD-10-CM

## 2025-01-21 DIAGNOSIS — O99.281 THYROID DISEASE DURING PREGNANCY IN FIRST TRIMESTER (HHS-HCC): ICD-10-CM

## 2025-01-21 DIAGNOSIS — E07.9 THYROID DISEASE DURING PREGNANCY IN FIRST TRIMESTER (HHS-HCC): ICD-10-CM

## 2025-01-21 LAB
POC APPEARANCE, URINE: CLEAR
POC BILIRUBIN, URINE: NEGATIVE
POC BLOOD, URINE: NEGATIVE
POC COLOR, URINE: YELLOW
POC GLUCOSE, URINE: NEGATIVE MG/DL
POC KETONES, URINE: NEGATIVE MG/DL
POC LEUKOCYTES, URINE: NEGATIVE
POC NITRITE,URINE: NEGATIVE
POC PH, URINE: 7 PH
POC PROTEIN, URINE: NEGATIVE MG/DL
POC SPECIFIC GRAVITY, URINE: 1.02
POC UROBILINOGEN, URINE: 0.2 EU/DL

## 2025-01-21 PROCEDURE — 81003 URINALYSIS AUTO W/O SCOPE: CPT | Mod: QW | Performed by: STUDENT IN AN ORGANIZED HEALTH CARE EDUCATION/TRAINING PROGRAM

## 2025-01-21 PROCEDURE — 76813 OB US NUCHAL MEAS 1 GEST: CPT

## 2025-01-21 PROCEDURE — 99214 OFFICE O/P EST MOD 30 MIN: CPT | Performed by: STUDENT IN AN ORGANIZED HEALTH CARE EDUCATION/TRAINING PROGRAM

## 2025-01-21 PROCEDURE — 76813 OB US NUCHAL MEAS 1 GEST: CPT | Performed by: OBSTETRICS & GYNECOLOGY

## 2025-01-21 NOTE — PROGRESS NOTES
MFM Follow-up  2025   11:31 AM     SUBJECTIVE    HPI: Elidia Lindsay is a 34 y.o.  at 11w5d here for RPNV. Denies pregnancy complaint and notes that this one has felt easier than the last.     She still desires cerclage placement and is hoping to have a rapid on/off spinal formulation like with chloroprocaine and fentanyl, so she can be discharged sooner.      She is taking aspirin prophylaxis.     OBJECTIVE    Visit Vitals  /78   Wt 53.5 kg (118 lb)   BMI 22.30 kg/m²   OB Status Pregnant   Smoking Status Never   BSA 1.52 m²        FHT: see ultrasound report     ASSESSMENT & PLAN    Elidia Lindsay is a 34 y.o.  at 11w5d here for the following concerns we addressed today:    Assessment & Plan  Cervical insufficiency during pregnancy in first trimester, antepartum  -Reviewed risks/benefits/alternatives to history indicated cerclage scheduled next week, and the patient desires to proceed. She would like a rapid acting spinal if available - will ask anesthesia on the day of surgery.        Pregnancy resulting from in vitro fertilization in first trimester ([facility])  -Planning for a detailed anatomic survey and growth ultrasounds at 30 and 36 weeks       History of gestational diabetes in prior pregnancy, currently pregnant ([facility])  -Normal hemoglobin A1c as early screening during this pregnancy, will pursue a 1 hour glucose tolerance test at 24-28 weeks       History of pre-eclampsia in prior pregnancy, currently pregnant ([facility])  -Counseled on recurrence risk and taking aspirin prophylaxis        History of retained placenta in prior pregnancy, currently pregnant in first trimester ([facility])  -Reviewed history of manual placental extraction, uterine atony requiring medications and Thais device, and intraamniotic infection diagnosed and treated with antibiotics.   -Reviewed risk of recurrence with this history, IVF pregnancy, and prior uterine instrumentation   -Recommend close observation  and management during the third stage of labor        Thyroid disease during pregnancy in first trimester ([facility])  -TSH was at goal on 12/30/24 on Synthroid 25mcg daily, continue  -Recheck TSH each trimester        Rh negative status during pregnancy in first trimester ([facility])  -Plan for prophylactic Rhogam at 28 weeks unless indicated sooner       11 weeks gestation of pregnancy ([facility])  [x] Initial BMI: 22.12   [x] Prenatal Labs:   [x] Cervical Cancer Screening up to date: [facility] 2023  [x] Rh status: NEGATIVE   [x] Genetic Screening (cfDNA): in process   [x] First Trimester Anatomy Screen (11-13.6 wks): normal   [x] Baby ASA (initiated): taking  [x] Pregnancy dated by: embryo transfer  [x] 39 weeks discussion of IOL vs. Expectant management: 39 weeks recommended due to IVF pregnancy   [x] Mode of delivery ( anticipated ): vaginal   Orders:    POCT UA Automated manually resulted      RTC in 4 weeks    Palak Hill MD

## 2025-01-22 PROBLEM — Z86.32 HISTORY OF GESTATIONAL DIABETES IN PRIOR PREGNANCY, CURRENTLY PREGNANT (HHS-HCC): Status: ACTIVE | Noted: 2023-11-24

## 2025-01-22 PROBLEM — O99.281 THYROID DISEASE DURING PREGNANCY IN FIRST TRIMESTER (HHS-HCC): Status: ACTIVE | Noted: 2023-10-04

## 2025-01-22 PROBLEM — O09.299 HISTORY OF GESTATIONAL DIABETES IN PRIOR PREGNANCY, CURRENTLY PREGNANT (HHS-HCC): Status: ACTIVE | Noted: 2023-11-24

## 2025-01-22 PROBLEM — O26.891 RH NEGATIVE STATUS DURING PREGNANCY IN FIRST TRIMESTER (HHS-HCC): Status: ACTIVE | Noted: 2023-10-04

## 2025-01-22 PROBLEM — Z3A.11 11 WEEKS GESTATION OF PREGNANCY (HHS-HCC): Status: ACTIVE | Noted: 2025-01-22

## 2025-01-22 PROBLEM — O09.291: Status: ACTIVE | Noted: 2025-01-22

## 2025-01-22 PROBLEM — Z83.1: Status: RESOLVED | Noted: 2023-11-27 | Resolved: 2025-01-22

## 2025-01-22 PROBLEM — O09.299 HISTORY OF PRE-ECLAMPSIA IN PRIOR PREGNANCY, CURRENTLY PREGNANT (HHS-HCC): Status: ACTIVE | Noted: 2024-02-02

## 2025-01-22 PROBLEM — O34.31 CERVICAL INSUFFICIENCY DURING PREGNANCY IN FIRST TRIMESTER, ANTEPARTUM: Status: ACTIVE | Noted: 2023-10-04

## 2025-01-22 PROBLEM — N97.9 FEMALE INFERTILITY: Status: RESOLVED | Noted: 2023-09-25 | Resolved: 2025-01-22

## 2025-01-22 NOTE — ASSESSMENT & PLAN NOTE
-TSH was at goal on 12/30/24 on Synthroid 25mcg daily, continue  -Recheck TSH each trimester

## 2025-01-22 NOTE — ASSESSMENT & PLAN NOTE
-Reviewed risks/benefits/alternatives to history indicated cerclage scheduled next week, and the patient desires to proceed. She would like a rapid acting spinal if available - will ask anesthesia on the day of surgery.

## 2025-01-22 NOTE — ASSESSMENT & PLAN NOTE
[x] Initial BMI: 22.12   [x] Prenatal Labs:   [x] Cervical Cancer Screening up to date: [facility] 2023  [x] Rh status: NEGATIVE   [x] Genetic Screening (cfDNA): in process   [x] First Trimester Anatomy Screen (11-13.6 wks): normal   [x] Baby ASA (initiated): taking  [x] Pregnancy dated by: embryo transfer  [x] 39 weeks discussion of IOL vs. Expectant management: 39 weeks recommended due to IVF pregnancy   [x] Mode of delivery ( anticipated ): vaginal   Orders:    POCT UA Automated manually resulted

## 2025-01-22 NOTE — ASSESSMENT & PLAN NOTE
-Normal hemoglobin A1c as early screening during this pregnancy, will pursue a 1 hour glucose tolerance test at 24-28 weeks

## 2025-01-22 NOTE — ASSESSMENT & PLAN NOTE
-Reviewed history of manual placental extraction, uterine atony requiring medications and Thais device, and intraamniotic infection diagnosed and treated with antibiotics.   -Reviewed risk of recurrence with this history, IVF pregnancy, and prior uterine instrumentation   -Recommend close observation and management during the third stage of labor

## 2025-01-26 ENCOUNTER — OFFICE VISIT (OUTPATIENT)
Dept: URGENT CARE | Age: 35
End: 2025-01-26
Payer: COMMERCIAL

## 2025-01-26 VITALS
HEART RATE: 76 BPM | OXYGEN SATURATION: 98 % | RESPIRATION RATE: 20 BRPM | TEMPERATURE: 98.2 F | SYSTOLIC BLOOD PRESSURE: 110 MMHG | DIASTOLIC BLOOD PRESSURE: 60 MMHG

## 2025-01-26 DIAGNOSIS — H69.92 EUSTACHIAN TUBE DYSFUNCTION, LEFT: ICD-10-CM

## 2025-01-26 DIAGNOSIS — J02.9 SORE THROAT: ICD-10-CM

## 2025-01-26 DIAGNOSIS — H66.002 NON-RECURRENT ACUTE SUPPURATIVE OTITIS MEDIA OF LEFT EAR WITHOUT SPONTANEOUS RUPTURE OF TYMPANIC MEMBRANE: Primary | ICD-10-CM

## 2025-01-26 LAB — POC RAPID STREP: NEGATIVE

## 2025-01-26 PROCEDURE — 1036F TOBACCO NON-USER: CPT

## 2025-01-26 PROCEDURE — 87880 STREP A ASSAY W/OPTIC: CPT

## 2025-01-26 PROCEDURE — 99203 OFFICE O/P NEW LOW 30 MIN: CPT

## 2025-01-26 RX ORDER — AMOXICILLIN 875 MG/1
875 TABLET, FILM COATED ORAL 2 TIMES DAILY
Qty: 10 TABLET | Refills: 0 | Status: SHIPPED | OUTPATIENT
Start: 2025-01-26 | End: 2025-01-31

## 2025-01-26 ASSESSMENT — ENCOUNTER SYMPTOMS
FEVER: 1
SORE THROAT: 1

## 2025-01-26 NOTE — PROGRESS NOTES
Subjective   Patient ID: Elidia Lindsay is a 34 y.o. female. They present today with a chief complaint of Fever, Sore Throat, and Earache (X 1 day).    History of Present Illness  12 weeks gestation/pregnancy. C/o sore throat and upper resp s/s x1-2 days. Has tried OTC meds with mild relief. Patient denies any CP, SOB, HA, fever, abdominal pain, and nausea/vomiting otherwise.      History provided by:  Patient  Fever   Associated symptoms include ear pain and a sore throat.   Sore Throat   Associated symptoms include ear pain.   Earache   Associated symptoms include a sore throat.       Past Medical History  Allergies as of 01/26/2025    (No Known Allergies)       (Not in a hospital admission)       Past Medical History:   Diagnosis Date    Acne, unspecified     Mild acne    Anxiety and depression     Family history of brucellosis 11/27/2023     with uncertain diagnosis of possible brucellosis, NOT confirmed      Female infertility 09/25/2023    Delacruz cerclage present ([facility])     Personal history of other diseases of the nervous system and sense organs 01/05/2022    History of hearing loss    Postural orthostatic tachycardia syndrome (POTS) 04/12/2017    POTS (postural orthostatic tachycardia syndrome)    Preeclampsia ([facility])     Recurrent pregnancy loss     Subclinical hypothyroidism     Vitamin D deficiency        Past Surgical History:   Procedure Laterality Date    MOUTH SURGERY  03/25/2016    Oral Surgery Tooth Extraction    OTHER SURGICAL HISTORY  11/29/2022    Dilation and evacuation    OTHER SURGICAL HISTORY  01/05/2022    Rhinoplasty        reports that she has never smoked. She has never used smokeless tobacco. She reports that she does not currently use alcohol. She reports that she does not use drugs.    Review of Systems  Review of Systems   Constitutional:  Positive for fever.   HENT:  Positive for ear pain and sore throat.                                   Objective    Vitals:     01/26/25 1714   BP: 110/60   Pulse: 76   Resp: 20   Temp: 36.8 °C (98.2 °F)   TempSrc: Temporal   SpO2: 98%     No LMP recorded. Patient is pregnant.    Physical Exam    Procedures    Point of Care Test & Imaging Results from this visit  Results for orders placed or performed in visit on 01/26/25   POCT rapid strep A manually resulted   Result Value Ref Range    POC Rapid Strep Negative Negative      No results found.    Diagnostic study results (if any) were reviewed by FLORENTINO Angel.    Assessment/Plan   Allergies, medications, history, and pertinent labs/EKGs/Imaging reviewed by FLORENTINO Angel.     Medical Decision Making  On exam, L OM. Will send amox. See orders. Start home cetrizine and Flonase for PND and ETD. Discussed pushing fluids, rest, OTC symptoms management PRN. Close follow up with PCP. Patient verbalized understanding and agreed with the plan of care.      At time of discharge, patient was clinically well-appearing and appropriate for outpatient management. The patient/parent/guardian was educated regarding diagnosis, supportive care, OTC and Rx medications. The patient/parent/guardian was given the opportunity to ask questions prior to discharge. They verbalized understanding of discussion of treatment plan, expected course of illness and/or injury, indications on when to return to , when to seek further evaluation in ED/call 911, and the need to follow up with PCP and/or specialist as referred. Patient/parent/guardian was provided with work/school documentation if requested. Patient stable upon discharge.      Orders and Diagnoses  Diagnoses and all orders for this visit:  Non-recurrent acute suppurative otitis media of left ear without spontaneous rupture of tympanic membrane  -     amoxicillin (Amoxil) 875 mg tablet; Take 1 tablet (875 mg) by mouth 2 times a day for 5 days. Complete full course of antibiotics, even if feeling better.  Sore throat  -     POCT rapid strep  A manually resulted  Eustachian tube dysfunction, left      Medical Admin Record      Patient disposition: Home    Electronically signed by FLORENTINO Angel  9:31 PM

## 2025-01-29 ENCOUNTER — ANESTHESIA EVENT (OUTPATIENT)
Dept: OBSTETRICS AND GYNECOLOGY | Facility: HOSPITAL | Age: 35
End: 2025-01-29
Payer: COMMERCIAL

## 2025-01-29 ENCOUNTER — HOSPITAL ENCOUNTER (OUTPATIENT)
Facility: HOSPITAL | Age: 35
Setting detail: OBSERVATION
Discharge: HOME | End: 2025-01-29
Attending: STUDENT IN AN ORGANIZED HEALTH CARE EDUCATION/TRAINING PROGRAM | Admitting: STUDENT IN AN ORGANIZED HEALTH CARE EDUCATION/TRAINING PROGRAM
Payer: COMMERCIAL

## 2025-01-29 ENCOUNTER — TELEPHONE (OUTPATIENT)
Dept: OBSTETRICS AND GYNECOLOGY | Facility: CLINIC | Age: 35
End: 2025-01-29
Payer: COMMERCIAL

## 2025-01-29 ENCOUNTER — ANESTHESIA (OUTPATIENT)
Dept: OBSTETRICS AND GYNECOLOGY | Facility: HOSPITAL | Age: 35
End: 2025-01-29
Payer: COMMERCIAL

## 2025-01-29 VITALS
SYSTOLIC BLOOD PRESSURE: 133 MMHG | OXYGEN SATURATION: 100 % | WEIGHT: 117.95 LBS | HEART RATE: 106 BPM | HEIGHT: 61 IN | DIASTOLIC BLOOD PRESSURE: 79 MMHG | TEMPERATURE: 97.2 F | RESPIRATION RATE: 17 BRPM | BODY MASS INDEX: 22.27 KG/M2

## 2025-01-29 DIAGNOSIS — O34.31 CERVICAL INSUFFICIENCY DURING PREGNANCY IN FIRST TRIMESTER, ANTEPARTUM: Primary | ICD-10-CM

## 2025-01-29 PROBLEM — O26.872 SHORT CERVIX WITH CERVICAL CERCLAGE, ANTEPARTUM, SECOND TRIMESTER: Status: ACTIVE | Noted: 2025-01-29

## 2025-01-29 PROBLEM — O34.32 SHORT CERVIX WITH CERVICAL CERCLAGE, ANTEPARTUM, SECOND TRIMESTER: Status: ACTIVE | Noted: 2025-01-29

## 2025-01-29 PROBLEM — E03.9 HYPOTHYROIDISM: Status: ACTIVE | Noted: 2025-01-29

## 2025-01-29 LAB
ABO GROUP (TYPE) IN BLOOD: NORMAL
ANTIBODY SCREEN: NORMAL
COMMENTS - MP RESULT TYPE: NORMAL
ERYTHROCYTE [DISTWIDTH] IN BLOOD BY AUTOMATED COUNT: 13.1 % (ref 11–15)
HCT VFR BLD AUTO: 44.7 % (ref 35–45)
HGB BLD-MCNC: 14.9 G/DL (ref 11.7–15.5)
MCH RBC QN AUTO: 32.3 PG (ref 27–33)
MCHC RBC AUTO-ENTMCNC: 33.3 G/DL (ref 32–36)
MCV RBC AUTO: 96.8 FL (ref 80–100)
PLATELET # BLD AUTO: 164 THOUSAND/UL (ref 140–400)
PMV BLD REES-ECKER: 11.9 FL (ref 7.5–12.5)
RBC # BLD AUTO: 4.62 MILLION/UL (ref 3.8–5.1)
RH FACTOR (ANTIGEN D): NORMAL
SCAN RESULT: NORMAL
WBC # BLD AUTO: 6.6 THOUSAND/UL (ref 3.8–10.8)

## 2025-01-29 PROCEDURE — G0378 HOSPITAL OBSERVATION PER HR: HCPCS

## 2025-01-29 PROCEDURE — 36415 COLL VENOUS BLD VENIPUNCTURE: CPT

## 2025-01-29 PROCEDURE — 59320 REVISION OF CERVIX: CPT | Performed by: STUDENT IN AN ORGANIZED HEALTH CARE EDUCATION/TRAINING PROGRAM

## 2025-01-29 PROCEDURE — 57700 REVISION OF CERVIX: CPT | Performed by: STUDENT IN AN ORGANIZED HEALTH CARE EDUCATION/TRAINING PROGRAM

## 2025-01-29 PROCEDURE — 7100000016 HC LABOR RECOVERY PER HOUR: Performed by: STUDENT IN AN ORGANIZED HEALTH CARE EDUCATION/TRAINING PROGRAM

## 2025-01-29 PROCEDURE — 2500000004 HC RX 250 GENERAL PHARMACY W/ HCPCS (ALT 636 FOR OP/ED)

## 2025-01-29 PROCEDURE — 86901 BLOOD TYPING SEROLOGIC RH(D): CPT

## 2025-01-29 PROCEDURE — 3700000014 HC AN EPIDURAL BLOCK CHARGE: Performed by: STUDENT IN AN ORGANIZED HEALTH CARE EDUCATION/TRAINING PROGRAM

## 2025-01-29 PROCEDURE — A59320 PR REVISION CERVIX W PREG,VAG APPRCH: Performed by: STUDENT IN AN ORGANIZED HEALTH CARE EDUCATION/TRAINING PROGRAM

## 2025-01-29 RX ORDER — SODIUM CHLORIDE, SODIUM LACTATE, POTASSIUM CHLORIDE, CALCIUM CHLORIDE 600; 310; 30; 20 MG/100ML; MG/100ML; MG/100ML; MG/100ML
125 INJECTION, SOLUTION INTRAVENOUS CONTINUOUS
Status: DISCONTINUED | OUTPATIENT
Start: 2025-01-29 | End: 2025-01-29 | Stop reason: HOSPADM

## 2025-01-29 RX ORDER — ACETAMINOPHEN 325 MG/1
975 TABLET ORAL EVERY 6 HOURS PRN
Status: DISCONTINUED | OUTPATIENT
Start: 2025-01-29 | End: 2025-01-29 | Stop reason: HOSPADM

## 2025-01-29 RX ORDER — FENTANYL CITRATE 50 UG/ML
INJECTION, SOLUTION INTRAMUSCULAR; INTRAVENOUS CONTINUOUS PRN
Status: DISCONTINUED | OUTPATIENT
Start: 2025-01-29 | End: 2025-01-29

## 2025-01-29 RX ORDER — HYDROMORPHONE HYDROCHLORIDE 1 MG/ML
0.2 INJECTION, SOLUTION INTRAMUSCULAR; INTRAVENOUS; SUBCUTANEOUS EVERY 5 MIN PRN
Status: CANCELLED | OUTPATIENT
Start: 2025-01-29

## 2025-01-29 RX ORDER — CHLOROPROCAINE HYDROCHLORIDE 30 MG/ML
INJECTION, SOLUTION EPIDURAL; INFILTRATION; INTRACAUDAL; PERINEURAL AS NEEDED
Status: DISCONTINUED | OUTPATIENT
Start: 2025-01-29 | End: 2025-01-29

## 2025-01-29 RX ORDER — LIDOCAINE HCL/EPINEPHRINE/PF 2%-1:200K
VIAL (ML) INJECTION AS NEEDED
Status: DISCONTINUED | OUTPATIENT
Start: 2025-01-29 | End: 2025-01-29

## 2025-01-29 RX ORDER — CYCLOBENZAPRINE HCL 10 MG
10 TABLET ORAL 3 TIMES DAILY PRN
Status: DISCONTINUED | OUTPATIENT
Start: 2025-01-29 | End: 2025-01-29 | Stop reason: HOSPADM

## 2025-01-29 RX ADMIN — CHLOROPROCAINE HYDROCHLORIDE 5 ML: 30 INJECTION, SOLUTION EPIDURAL; INFILTRATION; INTRACAUDAL; PERINEURAL at 13:45

## 2025-01-29 RX ADMIN — SODIUM CHLORIDE, POTASSIUM CHLORIDE, SODIUM LACTATE AND CALCIUM CHLORIDE 125 ML/HR: 600; 310; 30; 20 INJECTION, SOLUTION INTRAVENOUS at 12:49

## 2025-01-29 RX ADMIN — SODIUM CHLORIDE, SODIUM LACTATE, POTASSIUM CHLORIDE, AND CALCIUM CHLORIDE: 600; 310; 30; 20 INJECTION, SOLUTION INTRAVENOUS at 13:20

## 2025-01-29 RX ADMIN — CHLOROPROCAINE HYDROCHLORIDE 2 ML: 30 INJECTION, SOLUTION EPIDURAL; INFILTRATION; INTRACAUDAL; PERINEURAL at 14:17

## 2025-01-29 RX ADMIN — LIDOCAINE HYDROCHLORIDE,EPINEPHRINE BITARTRATE 5 ML: 20; .005 INJECTION, SOLUTION EPIDURAL; INFILTRATION; INTRACAUDAL; PERINEURAL at 13:38

## 2025-01-29 RX ADMIN — CHLOROPROCAINE HYDROCHLORIDE 3 ML: 30 INJECTION, SOLUTION EPIDURAL; INFILTRATION; INTRACAUDAL; PERINEURAL at 14:11

## 2025-01-29 SDOH — HEALTH STABILITY: MENTAL HEALTH: CURRENT SMOKER: 0

## 2025-01-29 ASSESSMENT — PAIN SCALES - GENERAL
PAIN_LEVEL: 0
PAINLEVEL_OUTOF10: 0 - NO PAIN
PAINLEVEL_OUTOF10: 0 - NO PAIN

## 2025-01-29 NOTE — H&P
OB Admission H&P    Assessment/Plan    Elidia Lindsay is a 34 y.o.  at 12w6d. GEOVANNY: 2025, by 7wk US presenting for history-indicated cerclage    Diagnosis: H/o previable PPROM    Plan  -For history-indicated cerclage  - Interested in rapid on/off spinal formulation, will defer to anesthesia  - Procedure consents signed  - For discharge home following procedure    D/w Dr. Sergio Saunders MD  PGY3, Obstetrics and Gynecology         Pregnancy Problems (from 24 to present)       Problem Noted Diagnosed Resolved    Short cervix with cervical cerclage, antepartum, second trimester 2025 by Jacquelyn Waters MD  No    Priority:  Medium       History of retained placenta in prior pregnancy, currently pregnant in first trimester ([facility]) 2025 by Palak Hill MD  No    Priority:  Medium       Overview Signed 2025  8:47 AM by Palak Hill MD      delivery: required manual placental extraction, had uterine atony requiring medications and Thais. Treated for presumed intraamniotic infection.          11 weeks gestation of pregnancy ([facility]) 2025 by Palak Hill MD  No    Priority:  Medium       Overview Addendum 2025  8:51 AM by Palak Hill MD     Desired provider in labor: [] CNM  [] Physician   [] Either Acceptable  [] Blood Products: [] Yes, accepts [] No, needs counseling  [x] Initial BMI: 22.12   [x] Prenatal Labs:   [x] Cervical Cancer Screening up to date: [facility]   [x] Rh status: NEGATIVE   [] Screen for IPV and Substance Use Risk:  [x] Genetic Screening (cfDNA): in process   [x] First Trimester Anatomy Screen (11-13.6 wks): normal   [x] Baby ASA (initiated): taking  [x] Pregnancy dated by: embryo transfer    [] Anatomy US: (19-20 wks)  [] Federal Sterilization consent signed (if indicated):  [] 1hr GCT at 24-28wks:  [] Rhogam (if indicated):   [] Fetal Surveillance (if indicated):  [] Tdap (27-32 wks, may be given up to 36 wks if initial window missed):   []  RSV (32-36 wks) (Sept. to end of ):     [] Feeding Intentions:  [] Postpartum Birth control method:   [] GBS at 36 - 37 wks:  [x] 39 weeks discussion of IOL vs. Expectant management: 39 weeks recommended due to IVF pregnancy   [x] Mode of delivery ( anticipated ): vaginal          History of pre-eclampsia in prior pregnancy, currently pregnant ([facility]) 2024 by KENNETH Hurley-CNP  No    Priority:  Medium       Overview Addendum 2024  5:34 PM by KENNETH Hurley-CNP     -Plan for bASA and b/l PEC labs early  in next pregnancy              History of gestational diabetes in prior pregnancy, currently pregnant ([facility]) 2023 by Brian Acuña MD  No    Priority:  Medium       Rh negative status during pregnancy in first trimester ([facility]) 10/4/2023 by Marguerite Srinivasan MD  No    Priority:  Medium       Cervical insufficiency during pregnancy in first trimester, antepartum 10/4/2023 by Marguerite Srinivasan MD  No    Priority:  Medium       Thyroid disease during pregnancy in first trimester ([facility]) 10/4/2023 by Marguerite Srinivasan MD  No    Priority:  Medium       Overview Signed 2025  8:43 AM by Palak Hill MD     Subclinical hypothyroidism on Synthroid 25mcg per ROBINA  Repeat TSH every trimester, normal in first                 Subjective   35yo  at 12.6wga by 7wk US presenting for history-indicated cerclage    Pt with history of 19wk previable PPROM, followed by term  in s/o cerclage. Denies ctx, abd pain, VB.       OB History    Para Term  AB Living   6 1 1 0 4 1   SAB IAB Ectopic Multiple Live Births   3 1 0 0 1      # Outcome Date GA Lbr Jeffrey/2nd Weight Sex Type Anes PTL Lv   6 Current            5 Term 23 38w2d 30:17 / 01:08 2.9 kg M Vag-Spont EPI  SOBEIDA      Complications: Uterine Atony, Hemorrhage      Name: TRENTON EISENBERG      Apgar1: 8  Apgar5: 9   4 SAB 10/17/22 19w1d             Complications: Premature rupture of membranes   3 2019            2 SAB 05/2019     Biochemical      1 IAB 04/2011               Past Surgical History:   Procedure Laterality Date    CERVICAL CERCLAGE      MOUTH SURGERY  03/25/2016    Oral Surgery Tooth Extraction    OTHER SURGICAL HISTORY  11/29/2022    Dilation and evacuation    OTHER SURGICAL HISTORY  01/05/2022    Rhinoplasty       Social History     Tobacco Use    Smoking status: Never    Smokeless tobacco: Never   Substance Use Topics    Alcohol use: Not Currently       No Known Allergies    Medications Prior to Admission   Medication Sig Dispense Refill Last Dose/Taking    amoxicillin (Amoxil) 875 mg tablet Take 1 tablet (875 mg) by mouth 2 times a day for 5 days. Complete full course of antibiotics, even if feeling better. 10 tablet 0 1/29/2025    aspirin 81 mg EC tablet Take 2 tablets (162 mg) by mouth once daily.   1/28/2025 Evening    levothyroxine (Synthroid, Levoxyl) 25 mcg tablet Take 1 tablet (25 mcg) by mouth once daily in the morning. Take before meals. 30 tablet 1 1/29/2025    PRENATAL 2-IRON-FOLIC ACID-OM3 ORAL Take by mouth.   1/29/2025    estradiol (Vivelle-Dot) 0.1 mg/24 hr patch Apply three (3) new patches to lower stomach once every other day as directed per provider. (Patient not taking: Reported on 1/26/2025) 48 patch 2     progesterone 50 mg/mL injection Inject 1.5 mL (75 mg) into the muscle once daily. Inject into the muscle at the same time each morning as directed per provider. (Patient not taking: Reported on 1/26/2025) 50 mL 3      Objective     Last Vitals  Temp Pulse Resp BP MAP O2 Sat   36.7 °C (98.1 °F) 80 18 124/76 94       Blood Pressures         1/29/2025  1127             BP: 124/76             Physical Exam  General: NAD, mood appropriate  Cardiopulmonary: warm and well perfused, breathing comfortably on room air  Abdomen: Gravid, non-distended  Extremities: Symmetric         Results from last 7 days   Lab Units 01/28/25  1225   [facility] WBC AUTO [place]/uL 6.6   [facility] HEMOGLOBIN g/dL  14.9   QUEST HEMATOCRIT % 44.7   [facility] PLATELETS AUTO [place]/uL 164

## 2025-01-29 NOTE — ANESTHESIA PROCEDURE NOTES
Epidural Block    Patient location during procedure: OR  Start time: 1/29/2025 1:23 PM  End time: 1/29/2025 1:45 PM  Reason for block: primary anesthetic  Staffing  Performed: resident   Authorized by: Cuate Newton MD    Performed by: Cuate Newton MD    Preanesthetic Checklist  Completed: patient identified, IV checked, risks and benefits discussed, surgical consent, monitors and equipment checked, pre-op evaluation, timeout performed and sterile techniques followed  Block Timeout  RN/Licensed healthcare professional reads aloud to the Anesthesia provider and entire team: Patient identity, procedure with side and site, patient position, and as applicable the availability of implants/special equipment/special requirements.  Patient on coagulant treatment: no  Timeout performed at: 1/29/2025 1:23 PM  Block Placement  Patient position: sitting  Prep: ChloraPrep  Sterility prep: cap, drape, gloves, hand and mask  Sedation level: no sedation  Patient monitoring: blood pressure, continuous pulse oximetry and heart rate  Approach: midline  Local numbing: lidocaine 1% to skin and subcutaneous tissues  Vertebral space: lumbar  Lumbar location: L3-L4  Epidural  Loss of resistance technique: saline  Guidance: landmark technique        Needle  Needle type: Tuohy   Needle gauge: 17  Needle length: 8.9cm  Needle insertion depth: 4 cm  Catheter type: multi-orifice  Catheter size: 19 G  Catheter at skin depth: 10 cm  Catheter securement method: clear occlusive dressing and liquid medical adhesive    Test dose: lidocaine 1.5% with epinephrine 1-to-200,000  Test dose: lidocaine 1.5% with epinephrine 1-to-200,000  Test dose result: no positive test dose            Assessment  Sensory level: T10  Block outcome: block to be assessed in the OR  Number of attempts: 1  Events: no positive test dose  Procedure assessment: patient tolerated procedure well with no immediate complications

## 2025-01-29 NOTE — OP NOTE
Date: 2025  OR Location: [facility] 2 OB    Name: Elidia Lindsay, : 1990, Age: 34 y.o., MRN: [id number], Sex: female    Diagnosis  Pre-op Diagnosis      * Pregnancy with poor obstetric history ([facility]) [O09.299] Post-op Diagnosis     * Pregnancy with poor obstetric history ([facility]HS-HCC) [O09.299]     Procedures  CERCLAGE, CERVIX  98418 - AR CERCLAGE CERVIX PREGNANCY VAGINAL      Surgeons      * Palak Hill - Primary    Resident/Fellow/Other Assistant:  Surgeons and Role:     * Jacquelyn Waters MD - Resident - Assisting    Staff:   Circulator: Jennifer Aguilar Person: Roseanne    Anesthesia Staff: Anesthesiologist: Alesha Georges MD  Anesthesia Resident: Cuate Newton MD    Procedure Summary  Anesthesia: Epidural  ASA: II  Estimated Blood Loss: 20mL  Intra-op Medications:   Administrations occurring from 1122 to 1431 on 25:   Medication Name Total Dose   lactated Ringer's infusion 212.5 mL   lactated Ringer's bolus 500 mL Cannot be calculated              Anesthesia Record               Intraprocedure I/O Totals          Intake    lactated Ringer's bolus 500 mL 500.00 mL    Total Intake 500 mL          Specimen: No specimens collected       Procedure Details:  The patient was seen in the preoperative area. The patient has been actively warmed in preoperative area. Preoperative antibiotics are not indicated. Venous thrombosis prophylaxis have been ordered including bilateral sequential compression devices     Findings: The cervix was parous and not dilated at the start of the procedure. The amniotic membranes were not visualized. At the conclusion of the cerclage, the cervix was ~3cm in length below the level of the cerclage. The cervix was closed and hemostatic.      Description of Procedure: After informed consent was obtained and all questions were answered, the patient was taken to the operating room.  Spinal anesthesia was administered without difficulty.  She was then prepped and draped in the  usual sterile fashion in dorsal lithotomy position using stirrups.  A time-out was performed. The bladder was drained for 200cc clear yellow urine.      The patient was placed in Trendelenburg, and a heavy-weighted speculum was placed in the posterior vaginal vault.  A retractor was placed in the anterior vagina.  Right angle retractors were also used laterally. The cervix and vaginal fornices were visualized.  The vesicovaginal and rectovaginal reflections were visualized.  A Delacruz cerclage was started inferior to the bladder flap at the 12 o'clock position using a #5 Ethibond suture. The stitch was carried around sequentially in a counter-clockwise fashion using ring forceps for traction and countertraction. When the stitch was back at the 12 o'clock position, the needle was removed from the suture, and the suture was tied.  The suture was cut with a long tail.  The vaginal fornices were irrigated and made dry.  Excellent hemostasis was noted at the end of the procedure.       All instruments were removed from the operative field.  The patient tolerated the procedure well.  Sponge, lap, and needle counts were correct x 2.  The patient was taken back to L&D in stable condition at the end of the procedure.      Complications:  None; patient tolerated the procedure well.     Disposition: L&D - hemodynamically stable.  Condition: stable    Dr. Hill was present for the entirety of the procedure,     KELIN Waters MD  PGY-II, Obstetrics & Gynecology   [facility]

## 2025-01-29 NOTE — TELEPHONE ENCOUNTER
Spoke to the patient and she confirmed she got her genetic results last night and that she is currently at the hospital awaiting her cerclage.   Gerda Tsai, the [employer] [facility] was notified of this via email as well.

## 2025-01-29 NOTE — ANESTHESIA PREPROCEDURE EVALUATION
Patient: Elidia Lindsay    Evaluation Method: In-person visit    Procedure Information       Date/Time: 25 1230    Procedure: CERCLAGE, CERVIX    Location: [facility] 04 / [facility] OB    Surgeons: Palak Hill MD        34 y.o.  at 12w6d      Relevant Problems   Anesthesia (within normal limits)      Cardiac (within normal limits)      Pulmonary (within normal limits)      Neuro (within normal limits)      GI (within normal limits)      /Renal (within normal limits)      Liver (within normal limits)      Hematology (within normal limits)      Musculoskeletal (within normal limits)      HEENT  Ear infection, currently on Augment       ID  Ear infection on augment       Skin (within normal limits)      GYN   (+) 11 weeks gestation of pregnancy ([facility])   (+) Pregnancy conceived through in vitro fertilization ([facility])     Past Surgical History:   Procedure Laterality Date   • CERVICAL CERCLAGE     • MOUTH SURGERY  2016    Oral Surgery Tooth Extraction   • OTHER SURGICAL HISTORY  2022    Dilation and evacuation   • OTHER SURGICAL HISTORY  2022    Rhinoplasty     No anesthesia complications.  Pt is NPO after midnight.      Clinical information reviewed:   Tobacco  Allergies  Meds   Med Hx  Surg Hx   Fam Hx  Soc Hx        NPO Detail:  No data recorded     OB/Gyn Evaluation    Present Pregnancy    Patient is pregnant now.   Obstetric History            Physical Exam    Airway  Mallampati: I     Cardiovascular - normal exam     Dental - normal exam     Pulmonary - normal exam     Abdominal - normal exam           Anesthesia Plan    History of general anesthesia?: yes  History of complications of general anesthesia?: no    ASA 2     epidural   (Plan for epidural with chloroprocaine because of prolonged effect of bupivaciane in previous epidural )  The patient is not a current smoker.    Anesthetic plan and risks discussed with patient.    Plan discussed with attending and  resident.

## 2025-01-29 NOTE — ANESTHESIA POSTPROCEDURE EVALUATION
Patient: Elidia Lindsay    Procedure Summary       Date: 01/29/25 Room / Location: [facility] 04 / [facility] 2 OB    Anesthesia Start: 1320 Anesthesia Stop: 1434    Procedure: CERCLAGE, CERVIX Diagnosis:       Pregnancy with poor obstetric history ([facility])      (Pregnancy with poor obstetric history ([facility]) [O09.299])    Surgeons: Palak Hill MD Responsible Provider: Alesha Georges MD    Anesthesia Type: epidural ASA Status: 2            Anesthesia Type: epidural    Vitals Value Taken Time   /82 01/29/25 1432   Temp 36.3 °C (97.3 °F) 01/29/25 1432   Pulse 91 01/29/25 1432   Resp 15 01/29/25 1434   SpO2 100 % 01/29/25 1432       Anesthesia Post Evaluation    Patient location during evaluation: floor  Patient participation: complete - patient cannot participate  Level of consciousness: awake and alert  Pain score: 0  Pain management: satisfactory to patient  Multimodal analgesia pain management approach  Airway patency: patent  Two or more strategies used to mitigate risk of obstructive sleep apnea  Cardiovascular status: acceptable and blood pressure returned to baseline  Respiratory status: acceptable  Hydration status: acceptable  Postoperative Nausea and Vomiting: none        No notable events documented.

## 2025-02-18 ENCOUNTER — APPOINTMENT (OUTPATIENT)
Dept: MATERNAL FETAL MEDICINE | Facility: CLINIC | Age: 35
End: 2025-02-18
Payer: COMMERCIAL

## 2025-02-18 DIAGNOSIS — E03.8 OTHER SPECIFIED HYPOTHYROIDISM: ICD-10-CM

## 2025-02-18 DIAGNOSIS — N63.0 BREAST LUMP IN FEMALE: ICD-10-CM

## 2025-02-19 ENCOUNTER — HOSPITAL ENCOUNTER (OUTPATIENT)
Dept: RADIOLOGY | Facility: CLINIC | Age: 35
Discharge: HOME | End: 2025-02-19
Payer: COMMERCIAL

## 2025-02-19 ENCOUNTER — APPOINTMENT (OUTPATIENT)
Dept: RADIOLOGY | Facility: CLINIC | Age: 35
End: 2025-02-19
Payer: COMMERCIAL

## 2025-02-19 DIAGNOSIS — N63.0 BREAST LUMP IN FEMALE: ICD-10-CM

## 2025-02-19 PROCEDURE — 76981 USE PARENCHYMA: CPT | Mod: RT

## 2025-02-19 PROCEDURE — 76642 ULTRASOUND BREAST LIMITED: CPT | Mod: RT

## 2025-02-19 PROCEDURE — 76642 ULTRASOUND BREAST LIMITED: CPT | Mod: RIGHT SIDE | Performed by: RADIOLOGY

## 2025-02-25 ENCOUNTER — ROUTINE PRENATAL (OUTPATIENT)
Dept: MATERNAL FETAL MEDICINE | Facility: CLINIC | Age: 35
End: 2025-02-25
Payer: COMMERCIAL

## 2025-02-25 VITALS — BODY MASS INDEX: 22.3 KG/M2 | DIASTOLIC BLOOD PRESSURE: 70 MMHG | SYSTOLIC BLOOD PRESSURE: 106 MMHG | WEIGHT: 118 LBS

## 2025-02-25 DIAGNOSIS — O09.812 PREGNANCY RESULTING FROM IN VITRO FERTILIZATION IN SECOND TRIMESTER (HHS-HCC): ICD-10-CM

## 2025-02-25 DIAGNOSIS — Z67.91 RH NEGATIVE STATUS DURING PREGNANCY IN SECOND TRIMESTER (HHS-HCC): ICD-10-CM

## 2025-02-25 DIAGNOSIS — Z3A.16 16 WEEKS GESTATION OF PREGNANCY (HHS-HCC): ICD-10-CM

## 2025-02-25 DIAGNOSIS — O09.299 HISTORY OF GESTATIONAL DIABETES IN PRIOR PREGNANCY, CURRENTLY PREGNANT (HHS-HCC): ICD-10-CM

## 2025-02-25 DIAGNOSIS — O26.892 RH NEGATIVE STATUS DURING PREGNANCY IN SECOND TRIMESTER (HHS-HCC): ICD-10-CM

## 2025-02-25 DIAGNOSIS — O34.32 CERVICAL INSUFFICIENCY DURING PREGNANCY IN SECOND TRIMESTER, ANTEPARTUM: Primary | ICD-10-CM

## 2025-02-25 DIAGNOSIS — Z86.32 HISTORY OF GESTATIONAL DIABETES IN PRIOR PREGNANCY, CURRENTLY PREGNANT (HHS-HCC): ICD-10-CM

## 2025-02-25 DIAGNOSIS — E07.9 THYROID DISEASE DURING PREGNANCY IN SECOND TRIMESTER (HHS-HCC): ICD-10-CM

## 2025-02-25 DIAGNOSIS — O99.282 THYROID DISEASE DURING PREGNANCY IN SECOND TRIMESTER (HHS-HCC): ICD-10-CM

## 2025-02-25 DIAGNOSIS — O09.292: ICD-10-CM

## 2025-02-25 DIAGNOSIS — O09.299 HISTORY OF PRE-ECLAMPSIA IN PRIOR PREGNANCY, CURRENTLY PREGNANT (HHS-HCC): ICD-10-CM

## 2025-02-25 LAB
POC APPEARANCE, URINE: CLEAR
POC BILIRUBIN, URINE: NEGATIVE
POC BLOOD, URINE: NEGATIVE
POC COLOR, URINE: YELLOW
POC GLUCOSE, URINE: NEGATIVE MG/DL
POC KETONES, URINE: NEGATIVE MG/DL
POC LEUKOCYTES, URINE: NEGATIVE
POC NITRITE,URINE: NEGATIVE
POC PH, URINE: 7 PH
POC PROTEIN, URINE: NEGATIVE MG/DL
POC SPECIFIC GRAVITY, URINE: 1.01
POC UROBILINOGEN, URINE: 0.2 EU/DL

## 2025-02-25 PROCEDURE — 99214 OFFICE O/P EST MOD 30 MIN: CPT | Performed by: STUDENT IN AN ORGANIZED HEALTH CARE EDUCATION/TRAINING PROGRAM

## 2025-02-25 PROCEDURE — 81003 URINALYSIS AUTO W/O SCOPE: CPT | Mod: QW | Performed by: STUDENT IN AN ORGANIZED HEALTH CARE EDUCATION/TRAINING PROGRAM

## 2025-02-25 RX ORDER — LEVOTHYROXINE SODIUM 25 UG/1
25 TABLET ORAL
Qty: 30 TABLET | Refills: 6 | Status: SHIPPED | OUTPATIENT
Start: 2025-02-25 | End: 2025-09-23

## 2025-02-25 NOTE — ASSESSMENT & PLAN NOTE
Desired provider in labor: [] CNM  [] Physician   [] Either Acceptable  [] Blood Products: [] Yes, accepts [] No, needs counseling  [x] Initial BMI: 22.12   [x] Prenatal Labs:   [x] Cervical Cancer Screening up to date: NILM 2023  [x] Rh status: NEGATIVE   [x] Genetic Screening (cfDNA): risk reducing  [x] First Trimester Anatomy Screen (11-13.6 wks): normal   [x] Baby ASA (initiated): taking  [x] Pregnancy dated by: embryo transfer    [] Anatomy US: (19-20 wks)  [] Federal Sterilization consent signed (if indicated):  [] 1hr GCT at 24-28wks:  [] Rhogam (if indicated):   [] Fetal Surveillance (if indicated): planning for 30 and 36 week growth US and weekly NSTs starting at 28 weeks  [] Tdap (27-32 wks, may be given up to 36 wks if initial window missed):   [] RSV (32-36 wks) (Sept. to end of Jan):     [] Feeding Intentions:  [] Postpartum Birth control method:   [] GBS at 36 - 37 wks:  [x] 39 weeks discussion of IOL vs. Expectant management: 39 weeks recommended due to IVF pregnancy   [x] Mode of delivery ( anticipated ): vaginal   Orders:    POCT UA Automated manually resulted

## 2025-02-25 NOTE — PROGRESS NOTES
MFM Follow-up  2025   9:06 AM     SUBJECTIVE    HPI: Elidia Lindsay is a 34 y.o.  at 16w5d here for RPNV. Denies pregnant complaint and notes that she feels great after the cerclage. She had some blood-tinged discharge for a couple days that resolved.     She recently had a breast ultrasound. She noted some breast pain with IVF hormone patches, then it came back around 14 weeks and wasn't sure if she had a lump. The result was normal.     OBJECTIVE    Visit Vitals  /70   Wt 53.5 kg (118 lb)   BMI 22.30 kg/m²   OB Status Pregnant   Smoking Status Never   BSA 1.52 m²        FHT: 150    ASSESSMENT & PLAN    Elidia Lindsay is a 34 y.o.  at 16w5d here for the following concerns we addressed today:    Assessment & Plan  Cervical insufficiency during pregnancy in second trimester, antepartum  -History indicated cerclage in place  -For removal at 36-37 weeks       Pregnancy resulting from in vitro fertilization in second trimester ([facility])  -No PGT, risk reducing cf DNA  -Scheduled for detailed anatomic survey       Thyroid disease during pregnancy in second trimester ([facility])  -Will recheck TSH later in second trimester        History of gestational diabetes in prior pregnancy, currently pregnant ([facility])  -Normal early A1c, plan for 1h GTT at 24-28 weeks       History of pre-eclampsia in prior pregnancy, currently pregnant ([facility])  -Taking aspirin       History of retained placenta in prior pregnancy, currently pregnant in second trimester ([facility])  -Reviewed recurrence risk       Rh negative status during pregnancy in second trimester ([facility])  -Rhogam at 28 weeks unless indicated sooner        16 weeks gestation of pregnancy ([facility])  Desired provider in labor: [] CNM  [] Physician   [] Either Acceptable  [] Blood Products: [] Yes, accepts [] No, needs counseling  [x] Initial BMI: 22.12   [x] Prenatal Labs:   [x] Cervical Cancer Screening up to date: NILM   [x] Rh status: NEGATIVE    [x] Genetic Screening (cfDNA): risk reducing  [x] First Trimester Anatomy Screen (11-13.6 wks): normal   [x] Baby ASA (initiated): taking  [x] Pregnancy dated by: embryo transfer    [] Anatomy US: (19-20 wks)  [] Federal Sterilization consent signed (if indicated):  [] 1hr GCT at 24-28wks:  [] Rhogam (if indicated):   [] Fetal Surveillance (if indicated): planning for 30 and 36 week growth US and weekly NSTs starting at 28 weeks  [] Tdap (27-32 wks, may be given up to 36 wks if initial window missed):   [] RSV (32-36 wks) (Sept. to end of Jan):     [] Feeding Intentions:  [] Postpartum Birth control method:   [] GBS at 36 - 37 wks:  [x] 39 weeks discussion of IOL vs. Expectant management: 39 weeks recommended due to IVF pregnancy   [x] Mode of delivery ( anticipated ): vaginal   Orders:    POCT UA Automated manually resulted      RTC in 4 weeks    Palak Hill MD

## 2025-03-17 ENCOUNTER — APPOINTMENT (OUTPATIENT)
Dept: RADIOLOGY | Facility: CLINIC | Age: 35
End: 2025-03-17
Payer: COMMERCIAL

## 2025-03-21 ENCOUNTER — ROUTINE PRENATAL (OUTPATIENT)
Dept: MATERNAL FETAL MEDICINE | Facility: CLINIC | Age: 35
End: 2025-03-21
Payer: COMMERCIAL

## 2025-03-21 ENCOUNTER — HOSPITAL ENCOUNTER (OUTPATIENT)
Dept: RADIOLOGY | Facility: CLINIC | Age: 35
Discharge: HOME | End: 2025-03-21
Payer: COMMERCIAL

## 2025-03-21 VITALS — SYSTOLIC BLOOD PRESSURE: 106 MMHG | WEIGHT: 118 LBS | DIASTOLIC BLOOD PRESSURE: 71 MMHG | BODY MASS INDEX: 22.3 KG/M2

## 2025-03-21 DIAGNOSIS — E07.9 THYROID DISEASE DURING PREGNANCY IN SECOND TRIMESTER (HHS-HCC): ICD-10-CM

## 2025-03-21 DIAGNOSIS — O34.32 CERVICAL INSUFFICIENCY DURING PREGNANCY IN SECOND TRIMESTER, ANTEPARTUM: Primary | ICD-10-CM

## 2025-03-21 DIAGNOSIS — O26.892 RH NEGATIVE STATUS DURING PREGNANCY IN SECOND TRIMESTER (HHS-HCC): ICD-10-CM

## 2025-03-21 DIAGNOSIS — Z32.00 PREGNANCY EXAMINATION OR TEST, PREGNANCY UNCONFIRMED: ICD-10-CM

## 2025-03-21 DIAGNOSIS — Z3A.20 20 WEEKS GESTATION OF PREGNANCY (HHS-HCC): ICD-10-CM

## 2025-03-21 DIAGNOSIS — O99.282 THYROID DISEASE DURING PREGNANCY IN SECOND TRIMESTER (HHS-HCC): ICD-10-CM

## 2025-03-21 DIAGNOSIS — O09.292: ICD-10-CM

## 2025-03-21 DIAGNOSIS — O09.299 HISTORY OF PRE-ECLAMPSIA IN PRIOR PREGNANCY, CURRENTLY PREGNANT (HHS-HCC): ICD-10-CM

## 2025-03-21 DIAGNOSIS — O09.812 PREGNANCY RESULTING FROM IN VITRO FERTILIZATION IN SECOND TRIMESTER (HHS-HCC): ICD-10-CM

## 2025-03-21 DIAGNOSIS — O09.299 HISTORY OF GESTATIONAL DIABETES IN PRIOR PREGNANCY, CURRENTLY PREGNANT (HHS-HCC): ICD-10-CM

## 2025-03-21 DIAGNOSIS — Z67.91 RH NEGATIVE STATUS DURING PREGNANCY IN SECOND TRIMESTER (HHS-HCC): ICD-10-CM

## 2025-03-21 DIAGNOSIS — Z86.32 HISTORY OF GESTATIONAL DIABETES IN PRIOR PREGNANCY, CURRENTLY PREGNANT (HHS-HCC): ICD-10-CM

## 2025-03-21 PROCEDURE — 76811 OB US DETAILED SNGL FETUS: CPT

## 2025-03-21 PROCEDURE — 99214 OFFICE O/P EST MOD 30 MIN: CPT | Mod: 25 | Performed by: OBSTETRICS & GYNECOLOGY

## 2025-03-21 RX ORDER — OMEGA-3-ACID ETHYL ESTERS 1 G/1
1 CAPSULE, LIQUID FILLED ORAL 2 TIMES DAILY
COMMUNITY

## 2025-03-21 NOTE — ASSESSMENT & PLAN NOTE
- Last delivery required manual placental extraction and had uterine atony requiring a Thais. Treated for presumed IAI.   - Plan for type and cross at delivery and post placental scan to confirm complete placental removal.

## 2025-03-21 NOTE — PROGRESS NOTES
MFM Follow-up  3/21/2025   8:46 AM     SUBJECTIVE    HPI: Elidia Lindsay is a 34 y.o.  at 20w1d here for RPNV. Denies contractions, bleeding, or LOF. Reports normal fetal movement. Patient reports that her whole house is sick. She currently feels well but we reviewed safe OTC meds today.     OBJECTIVE    Visit Vitals  /71   Wt 53.5 kg (118 lb)   BMI 22.30 kg/m²   OB Status Pregnant   Smoking Status Never   BSA 1.52 m²      Gen- NAD  Abd- gravid, nontender  Ext- symmetrical, nontender    Sono- unremarkable anatomy today    ASSESSMENT & PLAN    Elidia Lindsay is a 34 y.o.  at 20w1d here for the following concerns we addressed today:    Assessment & Plan  Cervical insufficiency during pregnancy in second trimester, antepartum  - Prior 16 week PPROM loss. History indicated cerclage placed 25.   - Plan cerclage removal at 36+ weeks.      Pregnancy resulting from in vitro fertilization in second trimester ([facility])  - Risk reducing cffDNA, no PGT-A  - Growth scans q4 weeks.   - Weekly NSTs at 28 weeks due to anxiety.   - Defer fetal echo as heart images by MFM optimal.     Thyroid disease during pregnancy in second trimester ([facility])  - Subclinical, euthyroid on low dose synthroid. Repeat TSH with 3rd trimester labs.     History of gestational diabetes in prior pregnancy, currently pregnant ([facility])  - Early A1c normal  - Plan routine screening 24-28 weeks.     History of pre-eclampsia in prior pregnancy, currently pregnant ([facility])  - Continue baby ASA  - Baseline labs unremarkable.     History of retained placenta in prior pregnancy, currently pregnant in second trimester ([facility])  - Last delivery required manual placental extraction and had uterine atony requiring a Thais. Treated for presumed IAI.   - Plan for type and cross at delivery and post placental scan to confirm complete placental removal.     Rh negative status during pregnancy in second trimester ([facility])  - Rhogam at 28 weeks  unless indicated sooner    20 weeks gestation of pregnancy ([facility])  - Prenatal care up to date.   - RTC in 4 weeks and 8 weeks. Growth scan in 8 weeks.     Marguerite Srinivasan MD  Maternal Fetal Medicine  Director of Fetal Intervention

## 2025-03-21 NOTE — ASSESSMENT & PLAN NOTE
- Risk reducing cffDNA, no PGT-A  - Growth scans q4 weeks.   - Weekly NSTs at 28 weeks due to anxiety.   - Defer fetal echo as heart images by MFM optimal.

## 2025-03-21 NOTE — ASSESSMENT & PLAN NOTE
- Prior 16 week PPROM loss. History indicated cerclage placed 1/29/25.   - Plan cerclage removal at 36+ weeks.

## 2025-03-24 ENCOUNTER — APPOINTMENT (OUTPATIENT)
Dept: RADIOLOGY | Facility: CLINIC | Age: 35
End: 2025-03-24
Payer: COMMERCIAL

## 2025-03-24 ENCOUNTER — APPOINTMENT (OUTPATIENT)
Dept: MATERNAL FETAL MEDICINE | Facility: CLINIC | Age: 35
End: 2025-03-24
Payer: COMMERCIAL

## 2025-04-01 ENCOUNTER — HOSPITAL ENCOUNTER (OUTPATIENT)
Facility: HOSPITAL | Age: 35
End: 2025-04-01
Attending: OBSTETRICS & GYNECOLOGY | Admitting: OBSTETRICS & GYNECOLOGY
Payer: COMMERCIAL

## 2025-04-01 ENCOUNTER — HOSPITAL ENCOUNTER (OUTPATIENT)
Facility: HOSPITAL | Age: 35
Discharge: HOME | End: 2025-04-01
Attending: OBSTETRICS & GYNECOLOGY | Admitting: OBSTETRICS & GYNECOLOGY
Payer: COMMERCIAL

## 2025-04-01 ENCOUNTER — TELEPHONE (OUTPATIENT)
Dept: OBSTETRICS AND GYNECOLOGY | Facility: CLINIC | Age: 35
End: 2025-04-01
Payer: COMMERCIAL

## 2025-04-01 VITALS
DIASTOLIC BLOOD PRESSURE: 74 MMHG | BODY MASS INDEX: 22.64 KG/M2 | HEART RATE: 103 BPM | HEIGHT: 61 IN | OXYGEN SATURATION: 100 % | TEMPERATURE: 97.5 F | SYSTOLIC BLOOD PRESSURE: 114 MMHG | RESPIRATION RATE: 16 BRPM | WEIGHT: 119.9 LBS

## 2025-04-01 LAB
APPEARANCE UR: CLEAR
BILIRUB UR STRIP.AUTO-MCNC: NEGATIVE MG/DL
CLUE CELLS SPEC QL WET PREP: NORMAL
COLOR UR: COLORLESS
GLUCOSE UR STRIP.AUTO-MCNC: NORMAL MG/DL
KETONES UR STRIP.AUTO-MCNC: NEGATIVE MG/DL
LEUKOCYTE ESTERASE UR QL STRIP.AUTO: NEGATIVE
NITRITE UR QL STRIP.AUTO: NEGATIVE
PH UR STRIP.AUTO: 7 [PH]
POC APPEARANCE, URINE: CLEAR
POC BILIRUBIN, URINE: NEGATIVE
POC BLOOD, URINE: NEGATIVE
POC COLOR, URINE: YELLOW
POC GLUCOSE, URINE: NEGATIVE MG/DL
POC KETONES, URINE: NEGATIVE MG/DL
POC LEUKOCYTES, URINE: NEGATIVE
POC NITRITE,URINE: NEGATIVE
POC PH, URINE: 7 PH
POC PROTEIN, URINE: NEGATIVE MG/DL
POC SPECIFIC GRAVITY, URINE: 1.01
POC UROBILINOGEN, URINE: 0.2 EU/DL
PROT UR STRIP.AUTO-MCNC: NEGATIVE MG/DL
RBC # UR STRIP.AUTO: NEGATIVE MG/DL
SP GR UR STRIP.AUTO: 1
T VAGINALIS SPEC QL WET PREP: NORMAL
UROBILINOGEN UR STRIP.AUTO-MCNC: NORMAL MG/DL
WBC VAG QL WET PREP: NORMAL
YEAST VAG QL WET PREP: NORMAL

## 2025-04-01 PROCEDURE — 99214 OFFICE O/P EST MOD 30 MIN: CPT

## 2025-04-01 PROCEDURE — 81003 URINALYSIS AUTO W/O SCOPE: CPT | Performed by: NURSE PRACTITIONER

## 2025-04-01 PROCEDURE — 87210 SMEAR WET MOUNT SALINE/INK: CPT | Performed by: NURSE PRACTITIONER

## 2025-04-01 PROCEDURE — 99213 OFFICE O/P EST LOW 20 MIN: CPT | Performed by: NURSE PRACTITIONER

## 2025-04-01 PROCEDURE — 81002 URINALYSIS NONAUTO W/O SCOPE: CPT | Performed by: NURSE PRACTITIONER

## 2025-04-01 RX ORDER — LIDOCAINE HYDROCHLORIDE 10 MG/ML
0.5 INJECTION, SOLUTION INFILTRATION; PERINEURAL ONCE AS NEEDED
Status: DISCONTINUED | OUTPATIENT
Start: 2025-04-01 | End: 2025-04-01 | Stop reason: HOSPADM

## 2025-04-01 SDOH — ECONOMIC STABILITY: HOUSING INSECURITY: DO YOU FEEL UNSAFE GOING BACK TO THE PLACE WHERE YOU ARE LIVING?: NO

## 2025-04-01 SDOH — SOCIAL STABILITY: SOCIAL INSECURITY: DOES ANYONE TRY TO KEEP YOU FROM HAVING/CONTACTING OTHER FRIENDS OR DOING THINGS OUTSIDE YOUR HOME?: NO

## 2025-04-01 SDOH — HEALTH STABILITY: MENTAL HEALTH: WISH TO BE DEAD (PAST 1 MONTH): NO

## 2025-04-01 SDOH — HEALTH STABILITY: MENTAL HEALTH: SUICIDAL BEHAVIOR (LIFETIME): NO

## 2025-04-01 SDOH — SOCIAL STABILITY: SOCIAL INSECURITY: HAVE YOU HAD ANY THOUGHTS OF HARMING ANYONE ELSE?: NO

## 2025-04-01 SDOH — SOCIAL STABILITY: SOCIAL INSECURITY: VERBAL ABUSE: DENIES

## 2025-04-01 SDOH — SOCIAL STABILITY: SOCIAL INSECURITY: ABUSE SCREEN: ADULT

## 2025-04-01 SDOH — SOCIAL STABILITY: SOCIAL INSECURITY: ARE YOU OR HAVE YOU BEEN THREATENED OR ABUSED PHYSICALLY, EMOTIONALLY, OR SEXUALLY BY ANYONE?: NO

## 2025-04-01 SDOH — HEALTH STABILITY: MENTAL HEALTH: HAVE YOU USED ANY PRESCRIPTION DRUGS OTHER THAN PRESCRIBED IN THE PAST 12 MONTHS?: NO

## 2025-04-01 SDOH — SOCIAL STABILITY: SOCIAL INSECURITY: HAVE YOU HAD THOUGHTS OF HARMING ANYONE ELSE?: NO

## 2025-04-01 SDOH — HEALTH STABILITY: MENTAL HEALTH: WERE YOU ABLE TO COMPLETE ALL THE BEHAVIORAL HEALTH SCREENINGS?: YES

## 2025-04-01 SDOH — HEALTH STABILITY: MENTAL HEALTH: NON-SPECIFIC ACTIVE SUICIDAL THOUGHTS (PAST 1 MONTH): NO

## 2025-04-01 SDOH — SOCIAL STABILITY: SOCIAL INSECURITY: DO YOU FEEL ANYONE HAS EXPLOITED OR TAKEN ADVANTAGE OF YOU FINANCIALLY OR OF YOUR PERSONAL PROPERTY?: NO

## 2025-04-01 SDOH — SOCIAL STABILITY: SOCIAL INSECURITY: PHYSICAL ABUSE: DENIES

## 2025-04-01 SDOH — SOCIAL STABILITY: SOCIAL INSECURITY: ARE THERE ANY APPARENT SIGNS OF INJURIES/BEHAVIORS THAT COULD BE RELATED TO ABUSE/NEGLECT?: NO

## 2025-04-01 SDOH — HEALTH STABILITY: MENTAL HEALTH: HAVE YOU USED ANY SUBSTANCES (CANABIS, COCAINE, HEROIN, HALLUCINOGENS, INHALANTS, ETC.) IN THE PAST 12 MONTHS?: NO

## 2025-04-01 SDOH — SOCIAL STABILITY: SOCIAL INSECURITY: HAS ANYONE EVER THREATENED TO HURT YOUR FAMILY OR YOUR PETS?: NO

## 2025-04-01 ASSESSMENT — LIFESTYLE VARIABLES
SKIP TO QUESTIONS 9-10: 1
AUDIT-C TOTAL SCORE: 0
AUDIT-C TOTAL SCORE: 0
HOW OFTEN DO YOU HAVE A DRINK CONTAINING ALCOHOL: NEVER
HOW MANY STANDARD DRINKS CONTAINING ALCOHOL DO YOU HAVE ON A TYPICAL DAY: PATIENT DOES NOT DRINK
HOW OFTEN DO YOU HAVE 6 OR MORE DRINKS ON ONE OCCASION: NEVER

## 2025-04-01 ASSESSMENT — PAIN SCALES - GENERAL: PAINLEVEL_OUTOF10: 0 - NO PAIN

## 2025-04-01 NOTE — TELEPHONE ENCOUNTER
Called patient in regarding to MyChart message. Patient had large gush of fluid after episode of vomiting, soaked through underwear. Reviewed with Dr. Srinivasan will have patient go to L & D triage for evaluation due to history of PPROM. Patient agreeable. Triage team aware.

## 2025-04-01 NOTE — H&P
Triage H&P    Elidia Lindsay is a 34 y.o. year old at 21w3d who presents to triage for No chief complaint on file.       Assessment/Plan:    Urinary incontinence  - SSE No blood in vault, knot at 12 oclock, normal appearing physiologic discharge, negative pooling, neg Nitrazine, neg ferning  - SVE 0/0/-4, no tension on knot  - UA with reflex and wet prep  sent, will notify if abnormal  - low concern for PTL/ PPROM at this time  - discussed PTL, FMCs, warning signs, when to return for eval, pt verb understanding    Maternal Well Being  - No acute distress  - Vitals stable and WNL    Fetal Well-Being  -  by doppler  - good FM    Dispo  - Safe and stable for d/c to home  - Follow-up with OB provider as scheduled on 4/18      Staffed with Dr. Eli Sigala, APRN-CNP      Medical Problems       Problem List       Vitamin D deficiency    Recurrent pregnancy loss, currently pregnant ([facility])    Overview Signed 2/25/2025  9:02 AM by Palak Hill MD     Negative workup         Pregnancy conceived through in vitro fertilization ([facility])    Overview Addendum 2/25/2025  9:25 AM by Palak Hill MD     No PGT  RR cfDNA    Plan per initial consult:  30 and 36 week growth US.  Weekly NSTs starting at 28 weeks due to anxiety.  Fetal echo if heart views not optimal by MFM.          Rh negative status during pregnancy in second trimester ([facility])    Cervical insufficiency during pregnancy in second trimester, antepartum    Overview Addendum 2/25/2025  9:04 AM by Palak Hill MD     History indicated cerclage placed 1/29/25  For removal at 36-37 weeks         Thyroid disease during pregnancy in second trimester ([facility])    Overview Signed 1/22/2025  8:43 AM by Palak Hill MD     Subclinical hypothyroidism on Synthroid 25mcg per ROBINA  Repeat TSH every trimester, normal in first         History of gestational diabetes in prior pregnancy, currently pregnant ([facility])    Overview Signed 2/25/2025   9:02 AM by Palak Hill MD     Early A1c normal         History of pre-eclampsia in prior pregnancy, currently pregnant ([facility])    Overview Addendum 2/2/2024  5:34 PM by KENNETH Hurley-CNP     -Plan for bASA and b/l PEC labs early  in next pregnancy              History of retained placenta in prior pregnancy, currently pregnant in second trimester ([facility])    Overview Signed 1/22/2025  8:47 AM by Palak Hill MD     2023 delivery: required manual placental extraction, had uterine atony requiring medications and Thais. Treated for presumed intraamniotic infection.          16 weeks gestation of pregnancy ([facility])    Overview Addendum 2/25/2025 12:34 PM by Palak Hill MD     Desired provider in labor: [] CNM  [] Physician   [] Either Acceptable  [] Blood Products: [] Yes, accepts [] No, needs counseling  [x] Initial BMI: 22.12   [x] Prenatal Labs:   [x] Cervical Cancer Screening up to date: [facility] 2023  [x] Rh status: NEGATIVE   [x] Genetic Screening (cfDNA): risk reducing  [x] First Trimester Anatomy Screen (11-13.6 wks): normal   [x] Baby ASA (initiated): taking  [x] Pregnancy dated by: embryo transfer    [] Anatomy US: (19-20 wks)  [] Federal Sterilization consent signed (if indicated):  [] 1hr GCT at 24-28wks:  [] Rhogam (if indicated):   [] Fetal Surveillance (if indicated): planning for 30 and 36 week growth US and weekly NSTs starting at 28 weeks  [] Tdap (27-32 wks, may be given up to 36 wks if initial window missed):   [] RSV (32-36 wks) (Sept. to end of Jan):     [] Feeding Intentions:  [] Postpartum Birth control method:   [] GBS at 36 - 37 wks:  [x] 39 weeks discussion of IOL vs. Expectant management: 39 weeks recommended due to IVF pregnancy   [x] Mode of delivery ( anticipated ): vaginal                 Subjective   Elidia Lindsay is a 34 y.o. year old at 21w3d who presents to triage for concerns of leaking of fluid. Patient states she had a lot of urgency to urinate at 10 AM this  morning and went to urinate and had to get up during urination due to sudden nausea and vomiting. Patient reports small gush of fluid during vomiting episode but has had no further leaking of fluid since. Reports she was vomiting mostly phlegm due to getting over a cold that she got from her son. Patient and her son both tested negative for flu/covid 3 days ago and feels as though symptoms are resolving. Denies any vaginal bleeding, cramping, vaginal symptoms. Reports positive fluttering.     OB History          6    Para   1    Term   1       0    AB   4    Living   1         SAB   3    IAB   1    Ectopic   0    Multiple   0    Live Births   1                  Past Surgical History:   Procedure Laterality Date    CERVICAL CERCLAGE      MOUTH SURGERY  2016    Oral Surgery Tooth Extraction    OTHER SURGICAL HISTORY  2022    Dilation and evacuation    OTHER SURGICAL HISTORY  2022    Rhinoplasty        Social History     Socioeconomic History    Marital status:      Spouse name: Jomar Lindsay    Number of children: Not on file    Years of education: Not on file    Highest education level: Not on file   Occupational History    Occupation: Homemaker   Tobacco Use    Smoking status: Never    Smokeless tobacco: Never   Substance and Sexual Activity    Alcohol use: Not Currently    Drug use: Never    Sexual activity: Yes     Partners: Male   Other Topics Concern    Not on file   Social History Narrative    Not on file     Social Drivers of Health     Financial Resource Strain: Not on file   Food Insecurity: Not on file   Transportation Needs: Not on file   Physical Activity: Not on file   Stress: Not on file   Social Connections: Not on file   Intimate Partner Violence: Not on file        No Known Allergies     Medications Prior to Admission   Medication Sig Dispense Refill Last Dose/Taking    aspirin 81 mg EC tablet Take 2 tablets (162 mg) by mouth once daily.   3/31/2025     levothyroxine (Synthroid, Levoxyl) 25 mcg tablet Take 1 tablet (25 mcg) by mouth once daily in the morning. Take before meals. 30 tablet 6 4/1/2025    PRENATAL 2-IRON-FOLIC ACID-OM3 ORAL Take by mouth.   4/1/2025    omega-3 acid ethyl esters (Lovaza) 1 gram capsule Take 1 capsule (1 g) by mouth 2 times a day.           Objective     Visit Vitals  /74   Pulse 103   Temp 36.4 °C (97.5 °F) (Temporal)   Resp 16        Physical Exam  Constitutional:       Appearance: Normal appearance.   Cardiovascular:      Rate and Rhythm: Normal rate and regular rhythm.   Pulmonary:      Effort: Pulmonary effort is normal.      Breath sounds: Normal breath sounds.   Abdominal:      General: Abdomen is flat. Bowel sounds are normal.      Palpations: Abdomen is soft.   Genitourinary:     Comments: - SSE No blood in vault, knot at 12 oclock, normal appearing physiologic discharge, negative pooling, neg Nitrazine, neg ferning  - SVE 0/0/-4, no tension on knot  Neurological:      General: No focal deficit present.      Mental Status: She is alert and oriented to person, place, and time.   Psychiatric:         Mood and Affect: Mood normal.         Behavior: Behavior normal.         Chaperone Present: Yes.  Chaperone Name/Title: JUAN George  Examination Chaperoned: Gynecological Exam       Labs  Admission on 04/01/2025   Component Date Value Ref Range Status    POC Color, Urine 04/01/2025 Yellow  Straw, Yellow, Light-Yellow Final    POC Appearance, Urine 04/01/2025 Clear  Clear Final    POC Glucose, Urine 04/01/2025 NEGATIVE  NEGATIVE mg/dl Final    POC Bilirubin, Urine 04/01/2025 NEGATIVE  NEGATIVE Final    POC Ketones, Urine 04/01/2025 NEGATIVE  NEGATIVE mg/dl Final    POC Specific Gravity, Urine 04/01/2025 1.010  1.005 - 1.035 Final    POC Blood, Urine 04/01/2025 NEGATIVE  NEGATIVE Final    POC PH, Urine 04/01/2025 7.0  No Reference Range Established PH Final    POC Protein, Urine 04/01/2025 NEGATIVE  NEGATIVE mg/dl Final    POC  Urobilinogen, Urine 04/01/2025 0.2  0.2, 1.0 EU/DL Final    Poc Nitrite, Urine 04/01/2025 NEGATIVE  NEGATIVE Final    POC Leukocytes, Urine 04/01/2025 NEGATIVE  NEGATIVE Final

## 2025-04-02 NOTE — PROGRESS NOTES
Acupuncture Visit:     Subjective   Patient ID: Elidia Lindsay is a 34 y.o. female who presents for Fertility Support    Fertility Support:   7/13/24  LMP 8/16/24  She is planning for a transfer in October.  Transfer cycle will start with next period.  Last period was good with no issues.  She has 3 heavy days and then goes away. Mild cramping. PMS is mild. She is getting symptoms at ovulation with fatigue and sometimes pain.    Resolving URI: she had an ear infection and took antibiotics. She has some lingering congestion but overall feeling on the mend.     Supplements: This is Needed Prenatal  Medications: none        Session Information  Is this acupuncture treatment being billed to the patient's insurance company: No  Visit Type: Follow-up visit  Medical History Reviewed: I have reviewed pertinent medical history in EHR, and no contraindications are present to provide treatment         Review of Systems  Sleep: not sleeping well while sick but getting better now.  Digestion: BM good  Stress: a little more stress with family being sick with URIs           Provider reviewed plan for the acupuncture session, precautions and contraindications. Patient/guardian/hospital staff has given consent to treat with full understanding of what to expect during the session. Before acupuncture began, provider explained to the patient to communicate at any time if the procedure was causing discomfort past their tolerance level. Patient agreed to advise acupuncturist. The acupuncturist counseled the patient on the risks of acupuncture treatment including pain, infection, bleeding, and no relief of pain. The patient was positioned comfortably. There was no evidence of infection at the site of needle insertions.    Objective   Physical Exam              Acupuncture Treatment  Patient Position: Supine on a table  Acupuncture Needling: Yes  Needle Guage: 36 guage /.20/ Blue seirin  Body Points: With retention  Body Points - Left:  P 6  Body Points - Bilateral: Du 20, ST 25, R 6, zigong, ST 36, SP 10, SP 6, KD 6, SOBEIDA 3, Du 23, yintang  Body Points - Right: SJ 5  Auricular Points: No  Electroacupuncture Used: No  Other Techniques Utilized: Ear seeds, TDP Lamp  Ear Seeds: Stainless steel (erik)  TDP Lamp Descripton: feet and abdomen  Needle Count In: 20  Needle Count Out: 20  Needle Retention Time (min): 25 minutes  Total Face to Face Time (min): 25 minutes              Assessment/Plan   Diagnoses and all orders for this visit:  Female infertility       oral

## 2025-04-04 ENCOUNTER — APPOINTMENT (OUTPATIENT)
Dept: INTEGRATIVE MEDICINE | Facility: CLINIC | Age: 35
End: 2025-04-04
Payer: COMMERCIAL

## 2025-04-18 ENCOUNTER — ROUTINE PRENATAL (OUTPATIENT)
Dept: MATERNAL FETAL MEDICINE | Facility: CLINIC | Age: 35
End: 2025-04-18
Payer: COMMERCIAL

## 2025-04-18 VITALS — DIASTOLIC BLOOD PRESSURE: 71 MMHG | WEIGHT: 122 LBS | SYSTOLIC BLOOD PRESSURE: 109 MMHG | BODY MASS INDEX: 23.05 KG/M2

## 2025-04-18 DIAGNOSIS — O09.292: ICD-10-CM

## 2025-04-18 DIAGNOSIS — O26.892 RH NEGATIVE STATUS DURING PREGNANCY IN SECOND TRIMESTER (HHS-HCC): ICD-10-CM

## 2025-04-18 DIAGNOSIS — O99.282 THYROID DISEASE DURING PREGNANCY IN SECOND TRIMESTER (HHS-HCC): ICD-10-CM

## 2025-04-18 DIAGNOSIS — Z67.91 RH NEGATIVE STATUS DURING PREGNANCY IN SECOND TRIMESTER (HHS-HCC): ICD-10-CM

## 2025-04-18 DIAGNOSIS — O09.299 HISTORY OF PRE-ECLAMPSIA IN PRIOR PREGNANCY, CURRENTLY PREGNANT (HHS-HCC): ICD-10-CM

## 2025-04-18 DIAGNOSIS — O34.32 CERVICAL INSUFFICIENCY DURING PREGNANCY IN SECOND TRIMESTER, ANTEPARTUM: ICD-10-CM

## 2025-04-18 DIAGNOSIS — E07.9 THYROID DISEASE DURING PREGNANCY IN SECOND TRIMESTER (HHS-HCC): ICD-10-CM

## 2025-04-18 DIAGNOSIS — O09.299 HISTORY OF GESTATIONAL DIABETES IN PRIOR PREGNANCY, CURRENTLY PREGNANT (HHS-HCC): ICD-10-CM

## 2025-04-18 DIAGNOSIS — Z3A.23 23 WEEKS GESTATION OF PREGNANCY (HHS-HCC): Primary | ICD-10-CM

## 2025-04-18 DIAGNOSIS — Z86.32 HISTORY OF GESTATIONAL DIABETES IN PRIOR PREGNANCY, CURRENTLY PREGNANT (HHS-HCC): ICD-10-CM

## 2025-04-18 PROCEDURE — 99214 OFFICE O/P EST MOD 30 MIN: CPT | Performed by: OBSTETRICS & GYNECOLOGY

## 2025-04-18 NOTE — PROGRESS NOTES
MFM Follow-up  2025   4:47 PM     SUBJECTIVE    HPI: Elidia Lindsay is a 34 y.o.  at 20w1d here for RPNV. Denies contractions, bleeding, or LOF. Reports normal fetal movement. Patient reports that her whole house is sick. She currently feels well but we reviewed safe OTC meds today.     OBJECTIVE    Visit Vitals  /71   Wt 55.3 kg (122 lb)   BMI 23.05 kg/m²   OB Status Pregnant   Smoking Status Never   BSA 1.54 m²      Gen- NAD  Abd- gravid, nontender  Ext- symmetrical, nontender    Sono- unremarkable anatomy today    ASSESSMENT & PLAN    Elidia Lindsay is a 34 y.o.  at 20w1d here for the following concerns we addressed today:    Assessment & Plan  23 weeks gestation of pregnancy ([facility])  -3rd tri labs ordered today (will get when she comes for next visit)        Rh negative status during pregnancy in second trimester ([facility])         Cervical insufficiency during pregnancy in second trimester, antepartum  - Prior 16 week PPROM loss. History indicated cerclage placed 25.   - Plan cerclage removal at 36+ weeks.         Thyroid disease during pregnancy in second trimester ([facility])  - Subclinical, euthyroid on low dose synthroid. Repeat TSH with 3rd trimester labs.        History of gestational diabetes in prior pregnancy, currently pregnant ([facility])  - Early A1c normal  - Plan routine screening 24-28 weeks.         History of pre-eclampsia in prior pregnancy, currently pregnant ([facility])  - Continue baby ASA  - Baseline labs unremarkable.         History of retained placenta in prior pregnancy, currently pregnant in second trimester ([facility])  - Last delivery required manual placental extraction and had uterine atony requiring a Thais. Treated for presumed IAI.   - Plan for type and cross at delivery and post placental scan to confirm complete placental removal.        Brian Acuña MD  Maternal Fetal Medicine

## 2025-04-22 ENCOUNTER — APPOINTMENT (OUTPATIENT)
Dept: MATERNAL FETAL MEDICINE | Facility: CLINIC | Age: 35
End: 2025-04-22
Payer: COMMERCIAL

## 2025-04-29 ENCOUNTER — APPOINTMENT (OUTPATIENT)
Dept: INTEGRATIVE MEDICINE | Facility: CLINIC | Age: 35
End: 2025-04-29
Payer: COMMERCIAL

## 2025-05-01 ENCOUNTER — APPOINTMENT (OUTPATIENT)
Dept: INTEGRATIVE MEDICINE | Facility: CLINIC | Age: 35
End: 2025-05-01

## 2025-05-07 ENCOUNTER — EVALUATION (OUTPATIENT)
Dept: PHYSICAL THERAPY | Facility: CLINIC | Age: 35
End: 2025-05-07
Payer: COMMERCIAL

## 2025-05-07 DIAGNOSIS — R10.2 PELVIC PAIN: Primary | ICD-10-CM

## 2025-05-07 PROCEDURE — 97110 THERAPEUTIC EXERCISES: CPT | Mod: GP | Performed by: PHYSICAL THERAPIST

## 2025-05-07 PROCEDURE — 97161 PT EVAL LOW COMPLEX 20 MIN: CPT | Mod: GP | Performed by: PHYSICAL THERAPIST

## 2025-05-07 ASSESSMENT — PATIENT HEALTH QUESTIONNAIRE - PHQ9
1. LITTLE INTEREST OR PLEASURE IN DOING THINGS: NOT AT ALL
2. FEELING DOWN, DEPRESSED OR HOPELESS: NOT AT ALL
SUM OF ALL RESPONSES TO PHQ9 QUESTIONS 1 AND 2: 0

## 2025-05-07 ASSESSMENT — COLUMBIA-SUICIDE SEVERITY RATING SCALE - C-SSRS
1. IN THE PAST MONTH, HAVE YOU WISHED YOU WERE DEAD OR WISHED YOU COULD GO TO SLEEP AND NOT WAKE UP?: NO
2. HAVE YOU ACTUALLY HAD ANY THOUGHTS OF KILLING YOURSELF?: NO
6. HAVE YOU EVER DONE ANYTHING, STARTED TO DO ANYTHING, OR PREPARED TO DO ANYTHING TO END YOUR LIFE?: NO

## 2025-05-07 ASSESSMENT — ENCOUNTER SYMPTOMS
OCCASIONAL FEELINGS OF UNSTEADINESS: 0
LOSS OF SENSATION IN FEET: 0
DEPRESSION: 0

## 2025-05-07 NOTE — PROGRESS NOTES
Physical Therapy    Physical Therapy Evaluation and Treatment     Patient Name:  Elidia Lindsay   Patient MRN: [id number]  Date: 5/7/2025    Time Calculation  Start Time: 0840  Stop Time: 0930  Time Calculation (min): 50 min    Referring provider: Dr. Brian Acuña/ Dr. Marguerite Srinivasan     Insurance:  Insurance Type: [employer] [place]  Visit number: 1    Approved # of visits 20  Authorization Needed: no      Therapy diagnoses:   Problem List Items Addressed This Visit    None  Visit Diagnoses         Codes      Pelvic pain    -  Primary R10.2    Relevant Orders    Follow Up In Physical Therapy              Precautions: pt is currently pregnant, high risk pregnancy  Fall risk: none    Assessment: Pt is 34 y.o. female currently 27 weeks pregnant with second child c/o right lateral and posterior hip pain and intermittent YOBANY.  Pt demonstrates typical pregnant posture, slight decrease in lumbar extension ROM, decreased bilateral hip strength, 2 finger width DARIEL at level of umbilicus, oblique dominant core strategy, and tenderness to gluteal medius and minimus (right worse than left).  Signs and symptoms consistent with  muscle weakness and spasm.  Pt is a good candidate for skilled PT intervention to meet outlined goals.  Internal assessment not performed today due to cervical cerclage and high risk pregnancy.     Plan  Interventions: Biofeedback, Cryotherapy, Dry Needling, Education/Instruction, Electrical Stimulation, Home Program, Hot Pack, Kinesiotaping, Manual Therapy, Neuromuscular Re-education, Self Care/Home Management, self-care, Therapeutic Exercises, Ultrasound  Frequency and Duration: 1x every 2-3 weeks throughout pregnancy   Rehab Potential: good  Plan of Care Agreement: patient    Goals:  Pt will meet the following goals in 12 weeks  Pt will tolerate vaginal penetration needed for medical exams and intercourse without pain  Pt will achieve neutral sitting posture in sitting and standing by activating  TA without cues to decrease load on joints of spine, pelvis, etc.  Pt will increase bilateral hip strength to 5/5 to help stabilize pelvis during squats, stairs, lifting, pushing, pulling, etc.  Pt will increase pelvic floor strength >/=3/5 for at least 10 seconds followed by complete relaxation demonstrating improved pelvic floor mobility and control  Improve FEMALE NIH-CPSI total score by at least 6 points (MCID) to improve QOL  Pt will cough/laugh/sneeze without urinary leakage 100% of time  Pt will wake no more than 1x per night to void to allow for restorative sleep  Pt will report 0/10 hip pain bending, lifting, twisting, prolonged walking, and sleeping consistently for at least 2 months       Subjective:    Chief Complaint: right hip pain, leaking with sneeze and vomitting     Currently 27 weeks pregnant, GEOVANNY 8/8/25 but will not exceed 7/31/25. (Girl) .  Pt has cervical cerclage as precaution this pregnancy     Seeing osteopath for right hip pain.      History of painful intercourse both superficially and deep.  Not having intercourse now because of incompetent cervix.     Relevant PMH: 12/16/23 son first vaginal delivery, 4-6 pregnancies miscarriages and/or chemical pregnancies.  Delivery of son: baby's head was stuck, manual extraction of uterus, pre-eclampsia, briana.    IVF, Progesterone shots, incompetent cervix     Pain:  Location: right SI into gluteals and will shoot down leg all the way to her calf   Current: 3-4/10  Type: dull achy, shooting   At best: 0/10 after osteopath and after delivery   At worst: 4/10  Makes better: some of the exercises   Makes worse: bending, sleeping on right side    Bladder:  Urgency: often  Daytime Frequency: every hour   Nighttime Frequency: 1-2x   Leakage: sneeze, vomitting  Small amount, large  Protection: none  Incomplete emptying/weak stream/spraying/UTI: no/ no/no/ no  Fluid intake: 4x 32 oz plain water, 8 oz coffee    Bowel:   Frequency: 1-3x day   Diarrhea:  no  Constipation: no  Fiber/stool softeners: none    Work: stay at home mom    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 Initial evaluation  2. Pt ed on diagnosis, prognosis, goals of PT, expectations   3. HEP (see below) ed on normal vs abnormal response  4. HL pelvic floor contraction with therapist cues and education on breathing, passive movement of pelvic floor, core canister, etc.     Access Code: [email or web address]  URL: [email or web address]  Date: 05/07/2025  Prepared by: Candy Strong    Exercises  - Supine Bridge  - 2 x daily - 7 x weekly - 2 sets - 10 reps - 5 hold  - Hooklying Transversus Abdominis Palpation  - 2 x daily - 7 x weekly - 1 sets - 10 reps - 5 hold  - Clamshell  - 2 x daily - 7 x weekly - 2 sets - 10 reps - 5 hold  - Sidelying Hip Abduction  - 2 x daily - 7 x weekly - 2 sets - 10 reps  - Quadruped Transversus Abdominis Bracing  - 2 x daily - 7 x weekly - 2 sets - 10 reps

## 2025-05-15 ENCOUNTER — APPOINTMENT (OUTPATIENT)
Dept: RADIOLOGY | Facility: CLINIC | Age: 35
End: 2025-05-15
Payer: COMMERCIAL

## 2025-05-16 ENCOUNTER — APPOINTMENT (OUTPATIENT)
Dept: LAB | Facility: HOSPITAL | Age: 35
End: 2025-05-16
Payer: COMMERCIAL

## 2025-05-16 ENCOUNTER — HOSPITAL ENCOUNTER (OUTPATIENT)
Dept: RADIOLOGY | Facility: CLINIC | Age: 35
Discharge: HOME | End: 2025-05-16
Payer: COMMERCIAL

## 2025-05-16 ENCOUNTER — ROUTINE PRENATAL (OUTPATIENT)
Dept: MATERNAL FETAL MEDICINE | Facility: CLINIC | Age: 35
End: 2025-05-16
Payer: COMMERCIAL

## 2025-05-16 VITALS — SYSTOLIC BLOOD PRESSURE: 113 MMHG | BODY MASS INDEX: 23.24 KG/M2 | WEIGHT: 123 LBS | DIASTOLIC BLOOD PRESSURE: 75 MMHG

## 2025-05-16 DIAGNOSIS — O09.299 HISTORY OF PRE-ECLAMPSIA IN PRIOR PREGNANCY, CURRENTLY PREGNANT (HHS-HCC): ICD-10-CM

## 2025-05-16 DIAGNOSIS — O99.283 THYROID DISEASE DURING PREGNANCY IN THIRD TRIMESTER (HHS-HCC): ICD-10-CM

## 2025-05-16 DIAGNOSIS — O34.33 CERVICAL INSUFFICIENCY DURING PREGNANCY IN THIRD TRIMESTER, ANTEPARTUM: Primary | ICD-10-CM

## 2025-05-16 DIAGNOSIS — Z67.91 RH NEGATIVE STATUS DURING PREGNANCY IN THIRD TRIMESTER (HHS-HCC): ICD-10-CM

## 2025-05-16 DIAGNOSIS — Z3A.23 23 WEEKS GESTATION OF PREGNANCY (HHS-HCC): ICD-10-CM

## 2025-05-16 DIAGNOSIS — Z32.00 PREGNANCY EXAMINATION OR TEST, PREGNANCY UNCONFIRMED: ICD-10-CM

## 2025-05-16 DIAGNOSIS — O26.893 RH NEGATIVE STATUS DURING PREGNANCY IN THIRD TRIMESTER (HHS-HCC): ICD-10-CM

## 2025-05-16 DIAGNOSIS — O09.813 PREGNANCY RESULTING FROM IN VITRO FERTILIZATION IN THIRD TRIMESTER (HHS-HCC): ICD-10-CM

## 2025-05-16 DIAGNOSIS — Z67.91: ICD-10-CM

## 2025-05-16 DIAGNOSIS — E07.9 THYROID DISEASE DURING PREGNANCY IN THIRD TRIMESTER (HHS-HCC): ICD-10-CM

## 2025-05-16 DIAGNOSIS — O09.299 HISTORY OF GESTATIONAL DIABETES IN PRIOR PREGNANCY, CURRENTLY PREGNANT (HHS-HCC): ICD-10-CM

## 2025-05-16 DIAGNOSIS — O26.893: ICD-10-CM

## 2025-05-16 DIAGNOSIS — Z86.32 HISTORY OF GESTATIONAL DIABETES IN PRIOR PREGNANCY, CURRENTLY PREGNANT (HHS-HCC): ICD-10-CM

## 2025-05-16 DIAGNOSIS — Z3A.28 28 WEEKS GESTATION OF PREGNANCY (HHS-HCC): ICD-10-CM

## 2025-05-16 LAB
ABO GROUP (TYPE) IN BLOOD: NORMAL
ANTIBODY SCREEN: NORMAL
ERYTHROCYTE [DISTWIDTH] IN BLOOD BY AUTOMATED COUNT: 15.1 % (ref 11.5–14.5)
HCT VFR BLD AUTO: 36.1 % (ref 36–46)
HGB BLD-MCNC: 12.7 G/DL (ref 12–16)
MCH RBC QN AUTO: 33.8 PG (ref 26–34)
MCHC RBC AUTO-ENTMCNC: 35.2 G/DL (ref 32–36)
MCV RBC AUTO: 96 FL (ref 80–100)
NRBC BLD-RTO: 0 /100 WBCS (ref 0–0)
PLATELET # BLD AUTO: 152 X10*3/UL (ref 150–450)
RBC # BLD AUTO: 3.76 X10*6/UL (ref 4–5.2)
REFLEX ADDED, ANEMIA PANEL: NORMAL
RH FACTOR (ANTIGEN D): NORMAL
WBC # BLD AUTO: 6.8 X10*3/UL (ref 4.4–11.3)

## 2025-05-16 PROCEDURE — 86850 RBC ANTIBODY SCREEN: CPT

## 2025-05-16 PROCEDURE — 76816 OB US FOLLOW-UP PER FETUS: CPT

## 2025-05-16 PROCEDURE — 96372 THER/PROPH/DIAG INJ SC/IM: CPT | Performed by: STUDENT IN AN ORGANIZED HEALTH CARE EDUCATION/TRAINING PROGRAM

## 2025-05-16 PROCEDURE — 90471 IMMUNIZATION ADMIN: CPT | Performed by: STUDENT IN AN ORGANIZED HEALTH CARE EDUCATION/TRAINING PROGRAM

## 2025-05-16 PROCEDURE — 99214 OFFICE O/P EST MOD 30 MIN: CPT | Mod: 25 | Performed by: STUDENT IN AN ORGANIZED HEALTH CARE EDUCATION/TRAINING PROGRAM

## 2025-05-16 PROCEDURE — 76819 FETAL BIOPHYS PROFIL W/O NST: CPT

## 2025-05-16 PROCEDURE — 85027 COMPLETE CBC AUTOMATED: CPT

## 2025-05-16 PROCEDURE — 86901 BLOOD TYPING SEROLOGIC RH(D): CPT

## 2025-05-16 PROCEDURE — 2500000004 HC RX 250 GENERAL PHARMACY W/ HCPCS (ALT 636 FOR OP/ED): Mod: JZ | Performed by: STUDENT IN AN ORGANIZED HEALTH CARE EDUCATION/TRAINING PROGRAM

## 2025-05-16 PROCEDURE — 86900 BLOOD TYPING SEROLOGIC ABO: CPT

## 2025-05-16 RX ADMIN — HUMAN RHO(D) IMMUNE GLOBULIN 300 MCG: 300 INJECTION, SOLUTION INTRAMUSCULAR at 11:34

## 2025-05-16 ASSESSMENT — ENCOUNTER SYMPTOMS
PSYCHIATRIC NEGATIVE: 0
RESPIRATORY NEGATIVE: 0
ALLERGIC/IMMUNOLOGIC NEGATIVE: 0
MUSCULOSKELETAL NEGATIVE: 0
CARDIOVASCULAR NEGATIVE: 0
HEMATOLOGIC/LYMPHATIC NEGATIVE: 0
EYES NEGATIVE: 0
GASTROINTESTINAL NEGATIVE: 0
CONSTITUTIONAL NEGATIVE: 0
NEUROLOGICAL NEGATIVE: 0
ENDOCRINE NEGATIVE: 0

## 2025-05-16 ASSESSMENT — EDINBURGH POSTNATAL DEPRESSION SCALE (EPDS)
THE THOUGHT OF HARMING MYSELF HAS OCCURRED TO ME: NEVER
I HAVE BLAMED MYSELF UNNECESSARILY WHEN THINGS WENT WRONG: NO, NEVER
THINGS HAVE BEEN GETTING ON TOP OF ME: NO, I HAVE BEEN COPING AS WELL AS EVER
I HAVE FELT SCARED OR PANICKY FOR NO GOOD REASON: NO, NOT AT ALL
I HAVE BEEN SO UNHAPPY THAT I HAVE HAD DIFFICULTY SLEEPING: NOT AT ALL
I HAVE FELT SAD OR MISERABLE: NO, NOT AT ALL
I HAVE BEEN ANXIOUS OR WORRIED FOR NO GOOD REASON: HARDLY EVER
I HAVE LOOKED FORWARD WITH ENJOYMENT TO THINGS: AS MUCH AS I EVER DID
I HAVE BEEN ABLE TO LAUGH AND SEE THE FUNNY SIDE OF THINGS: AS MUCH AS I ALWAYS COULD
TOTAL SCORE: 1
I HAVE BEEN SO UNHAPPY THAT I HAVE BEEN CRYING: NO, NEVER

## 2025-05-16 NOTE — PROGRESS NOTES
MFM Follow-up  2025     SUBJECTIVE    HPI: Elidia Lindsay is a 34 y.o.  at 27w6d here for RPNV. Denies contractions, bleeding, or LOF. Reports normal fetal movement. Patient reports that she did glucose testing instead of the 1h GTT and it was normal, reviewed previously. She is getting labs drawn today and will get Rhogam and TDAP after. She wants to start weekly  testing now.       OBJECTIVE    Visit Vitals  /75   Wt 55.8 kg (123 lb)   BMI 23.24 kg/m²   OB Status Pregnant   Smoking Status Never   BSA 1.55 m²        FHT: see US report     ASSESSMENT & PLAN    Elidia Lindsay is a 34 y.o.  at 27w6d here for the following concerns we addressed today:    Assessment & Plan  Cervical insufficiency during pregnancy in third trimester, antepartum  -History indicated cerclage in place  -Growth ultrasound pending from today, will repeat every 4 weeks after  -Weekly NSTs starting next week per patient preference        Rh negative status during pregnancy, third trimester ([facility])  -Type and screen collected today before Rhogam administration    Orders:    rho(D) immune globulin (Rhogam) injection 300 mcg    Pregnancy resulting from in vitro fertilization in third trimester ([facility])  -Growth and testing per #1       History of pre-eclampsia in prior pregnancy, currently pregnant ([facility])  -Normotensive       History of gestational diabetes in prior pregnancy, currently pregnant ([facility])  -Had normal glucose values on pattern screening, will defer 1h GTT and presume no GDM       Thyroid disease during pregnancy in third trimester ([facility])  -TSH collected today        28 weeks gestation of pregnancy ([facility])  -TDAP and CBC today          RTC in 4 weeks with weekly NSTs starting next week    Palak Hill MD

## 2025-05-16 NOTE — ASSESSMENT & PLAN NOTE
-History indicated cerclage in place  -Growth ultrasound pending from today, will repeat every 4 weeks after  -Weekly NSTs starting next week per patient preference

## 2025-05-17 LAB — TSH SERPL-ACNC: 1.62 MIU/L

## 2025-05-22 ENCOUNTER — APPOINTMENT (OUTPATIENT)
Dept: INTEGRATIVE MEDICINE | Facility: CLINIC | Age: 35
End: 2025-05-22
Payer: COMMERCIAL

## 2025-05-22 DIAGNOSIS — Z34.92 SECOND TRIMESTER PREGNANCY (HHS-HCC): Primary | ICD-10-CM

## 2025-05-22 PROCEDURE — 97811 ACUP 1/> W/O ESTIM EA ADD 15: CPT | Performed by: ACUPUNCTURIST

## 2025-05-22 PROCEDURE — 97810 ACUP 1/> WO ESTIM 1ST 15 MIN: CPT | Performed by: ACUPUNCTURIST

## 2025-05-22 NOTE — PROGRESS NOTES
Acupuncture Visit:     Subjective   Patient ID: Elidia Lindsay is a 34 y.o. female who presents for No chief complaint on file.    Pregnancy Support:  She is 29 weeks pregnant.  Pregnancy has been going well.     Supplements: This is Needed Prenatal  Medications: estrogen patches, progesterone, prednisone, ASA                Review of Systems  Sleep: overall  Digestion: BM good  Stress: overall good.            Provider reviewed plan for the acupuncture session, precautions and contraindications. Patient/guardian/hospital staff has given consent to treat with full understanding of what to expect during the session. Before acupuncture began, provider explained to the patient to communicate at any time if the procedure was causing discomfort past their tolerance level. Patient agreed to advise acupuncturist. The acupuncturist counseled the patient on the risks of acupuncture treatment including pain, infection, bleeding, and no relief of pain. The patient was positioned comfortably. There was no evidence of infection at the site of needle insertions.    Objective   Physical Exam                             Assessment/Plan   There are no diagnoses linked to this encounter.       minutes  Total Face to Face Time (min): 25 minutes              Assessment/Plan   Diagnoses and all orders for this visit:  Second trimester pregnancy ([facility])

## 2025-05-23 ENCOUNTER — PROCEDURE VISIT (OUTPATIENT)
Dept: OBSTETRICS AND GYNECOLOGY | Facility: CLINIC | Age: 35
End: 2025-05-23
Payer: COMMERCIAL

## 2025-05-23 VITALS — DIASTOLIC BLOOD PRESSURE: 72 MMHG | SYSTOLIC BLOOD PRESSURE: 106 MMHG

## 2025-05-23 DIAGNOSIS — O34.31 CERVICAL INSUFFICIENCY DURING PREGNANCY IN FIRST TRIMESTER, ANTEPARTUM: Primary | ICD-10-CM

## 2025-05-23 DIAGNOSIS — O09.813 PREGNANCY RESULTING FROM IN VITRO FERTILIZATION IN THIRD TRIMESTER (HHS-HCC): ICD-10-CM

## 2025-05-23 DIAGNOSIS — Z3A.29 29 WEEKS GESTATION OF PREGNANCY (HHS-HCC): ICD-10-CM

## 2025-05-23 PROCEDURE — 59025 FETAL NON-STRESS TEST: CPT | Performed by: STUDENT IN AN ORGANIZED HEALTH CARE EDUCATION/TRAINING PROGRAM

## 2025-05-23 NOTE — PROGRESS NOTES
25    Elidia Lindsay is a 34 y.o. year old  at 29w0d presenting for NST for cervical insufficiency and IVF pregnancy.     Non-Stress Test    Baseline Fetal Heart Rate for Non-Stress Test: 135 BPM  Variability in Waveform for Non-Stress Test: Moderate   Accelerations in Non-Stress Test: Yes, greater than/equal to 10 BPM, lasting at least 10 seconds  Decelerations in Non-Stress Test: None   Contractions in Non-Stress Test: None     Interpretation of Non-Stress Test: Reactive     Plan: Continue weekly NSTs    Anita Abbott MD

## 2025-05-28 PROBLEM — Z3A.30 30 WEEKS GESTATION OF PREGNANCY (HHS-HCC): Status: ACTIVE | Noted: 2025-01-22

## 2025-05-28 NOTE — ASSESSMENT & PLAN NOTE
-History indicated cerclage in place  -Growth as above  -Repeat growth every 4 weeks starting 5/16  -Weekly NSTs per patient preference

## 2025-05-28 NOTE — PROGRESS NOTES
MFM Follow-up  2025   10:15 AM     SUBJECTIVE    HPI: Elidia Lindsay is a 34 y.o.  at 30w0d here for RPNV. Denies contractions, bleeding, or LOF. Reports normal fetal movement. Patient reports no questions or concerns at this time.    Questions about provider concerns for her birth plan given hx of PPH requiring briana iso PreE during prior delivery.    Cerclage - denies bleeding    Hx of PreE - normotensive in office today    OBJECTIVE    Visit Vitals  /75   Wt 56.7 kg (125 lb)   BMI 23.62 kg/m²   OB Status Pregnant   Smoking Status Never   BSA 1.56 m²      FHT: 130  NST interpretation:  FHR: 130-140s, moderate variability.  +10x10 accelerations, one possible variable deceleration (though broken tracing and when changing position)  [place]: quiet    BSUS performed:  Presentation: vtx  Placenta: posterior  DVP: 5.81    Overall reassuring and reactive monitoring.    ASSESSMENT & PLAN    Elidia Lindsay is a 34 y.o.  at 30w0d here for the following concerns we addressed today:    Assessment & Plan  30 weeks gestation of pregnancy ([facility])  -Growth : EFW 1072 20%, AC 31%  -CBC  WNL  - NST WNL: , mod variability, + accel, - decel, - cx  -Cont weekly NSTs per patient preference  -S/p rhogam + Tdap  Orders:    POCT UA Automated manually resulted    Thyroid disease during pregnancy in third trimester ([facility])  -  TSH 1.62, WNL       History of gestational diabetes in prior pregnancy, currently pregnant ([facility])  -Had normal glucose values on pattern screening, will defer 1h GTT and presume no GDM        Rh negative status during pregnancy in third trimester ([facility])  - S/p rhogam   -  T&S O neg, antibody neg       Cervical insufficiency during pregnancy in third trimester, antepartum  -History indicated cerclage in place  -Growth as above  -Repeat growth every 4 weeks starting   -Weekly NSTs per patient preference          History of pre-eclampsia in prior  pregnancy, currently pregnant ([facility])  - Normotensive to date this pregnancy  - Normotensive in office 5/30       Pregnancy resulting from in vitro fertilization in third trimester ([facility])  -Growth as above         RTC in 1 week for NST    Patient seen and evaluated with Dr. Domenico Colin, MS4  [facility]     I, or a resident under my supervision, was present with the medical student who participated in the documentation of this note.  I have personally seen and examined the patient and performed the medical decision-making components. I have reviewed the medical student documentation and/or resident documentation and verified the findings in the note as written with additions or exceptions as stated in the body of the note.    Brian Acuña MD  Maternal Fetal Medicine

## 2025-05-28 NOTE — ASSESSMENT & PLAN NOTE
-Growth 5/16: EFW 1072 20%, AC 31%  -CBC 5/16 WNL  -5/30 NST WNL: , mod variability, + accel, - decel, - cx  -Cont weekly NSTs per patient preference  -S/p rhogam + Tdap  Orders:    POCT UA Automated manually resulted

## 2025-05-30 ENCOUNTER — PROCEDURE VISIT (OUTPATIENT)
Dept: OBSTETRICS AND GYNECOLOGY | Facility: CLINIC | Age: 35
End: 2025-05-30
Payer: COMMERCIAL

## 2025-05-30 ENCOUNTER — ROUTINE PRENATAL (OUTPATIENT)
Dept: MATERNAL FETAL MEDICINE | Facility: CLINIC | Age: 35
End: 2025-05-30
Payer: COMMERCIAL

## 2025-05-30 VITALS — SYSTOLIC BLOOD PRESSURE: 109 MMHG | WEIGHT: 125 LBS | DIASTOLIC BLOOD PRESSURE: 75 MMHG | BODY MASS INDEX: 23.62 KG/M2

## 2025-05-30 DIAGNOSIS — Z86.32 HISTORY OF GESTATIONAL DIABETES IN PRIOR PREGNANCY, CURRENTLY PREGNANT (HHS-HCC): ICD-10-CM

## 2025-05-30 DIAGNOSIS — O09.299 HISTORY OF GESTATIONAL DIABETES IN PRIOR PREGNANCY, CURRENTLY PREGNANT (HHS-HCC): ICD-10-CM

## 2025-05-30 DIAGNOSIS — O26.20 RECURRENT PREGNANCY LOSS, CURRENTLY PREGNANT (HHS-HCC): ICD-10-CM

## 2025-05-30 DIAGNOSIS — O09.292: ICD-10-CM

## 2025-05-30 DIAGNOSIS — O09.299 HISTORY OF PRE-ECLAMPSIA IN PRIOR PREGNANCY, CURRENTLY PREGNANT (HHS-HCC): ICD-10-CM

## 2025-05-30 DIAGNOSIS — E07.9 THYROID DISEASE DURING PREGNANCY IN THIRD TRIMESTER (HHS-HCC): ICD-10-CM

## 2025-05-30 DIAGNOSIS — O26.893 RH NEGATIVE STATUS DURING PREGNANCY IN THIRD TRIMESTER (HHS-HCC): ICD-10-CM

## 2025-05-30 DIAGNOSIS — Z3A.30 30 WEEKS GESTATION OF PREGNANCY (HHS-HCC): Primary | ICD-10-CM

## 2025-05-30 DIAGNOSIS — Z3A.30 30 WEEKS GESTATION OF PREGNANCY (HHS-HCC): ICD-10-CM

## 2025-05-30 DIAGNOSIS — O99.283 THYROID DISEASE DURING PREGNANCY IN THIRD TRIMESTER (HHS-HCC): ICD-10-CM

## 2025-05-30 DIAGNOSIS — O09.813 PREGNANCY RESULTING FROM IN VITRO FERTILIZATION IN THIRD TRIMESTER (HHS-HCC): ICD-10-CM

## 2025-05-30 DIAGNOSIS — Z67.91 RH NEGATIVE STATUS DURING PREGNANCY IN THIRD TRIMESTER (HHS-HCC): ICD-10-CM

## 2025-05-30 DIAGNOSIS — O34.33 CERVICAL INSUFFICIENCY DURING PREGNANCY IN THIRD TRIMESTER, ANTEPARTUM: ICD-10-CM

## 2025-05-30 PROCEDURE — 99214 OFFICE O/P EST MOD 30 MIN: CPT | Performed by: OBSTETRICS & GYNECOLOGY

## 2025-05-30 PROCEDURE — 59025 FETAL NON-STRESS TEST: CPT | Performed by: OBSTETRICS & GYNECOLOGY

## 2025-05-30 PROCEDURE — 81003 URINALYSIS AUTO W/O SCOPE: CPT | Mod: QW | Performed by: OBSTETRICS & GYNECOLOGY

## 2025-05-30 ASSESSMENT — ENCOUNTER SYMPTOMS
ALLERGIC/IMMUNOLOGIC NEGATIVE: 0
ENDOCRINE NEGATIVE: 0
GASTROINTESTINAL NEGATIVE: 0
CONSTITUTIONAL NEGATIVE: 0
MUSCULOSKELETAL NEGATIVE: 0
EYES NEGATIVE: 0
HEMATOLOGIC/LYMPHATIC NEGATIVE: 0
RESPIRATORY NEGATIVE: 0
CARDIOVASCULAR NEGATIVE: 0
NEUROLOGICAL NEGATIVE: 0
PSYCHIATRIC NEGATIVE: 0

## 2025-06-06 ENCOUNTER — PROCEDURE VISIT (OUTPATIENT)
Dept: OBSTETRICS AND GYNECOLOGY | Facility: CLINIC | Age: 35
End: 2025-06-06
Payer: COMMERCIAL

## 2025-06-06 VITALS — SYSTOLIC BLOOD PRESSURE: 114 MMHG | DIASTOLIC BLOOD PRESSURE: 74 MMHG

## 2025-06-06 DIAGNOSIS — O09.299 PREGNANCY WITH POOR OBSTETRIC HISTORY (HHS-HCC): ICD-10-CM

## 2025-06-06 DIAGNOSIS — O09.813 PREGNANCY RESULTING FROM IN VITRO FERTILIZATION IN THIRD TRIMESTER (HHS-HCC): Primary | ICD-10-CM

## 2025-06-06 PROCEDURE — 59025 FETAL NON-STRESS TEST: CPT | Performed by: OBSTETRICS & GYNECOLOGY

## 2025-06-06 NOTE — PROCEDURES
Non-Stress Test   Baseline Fetal Heart Rate for Non-Stress Test: 140 BPM  Variability in Waveform for Non-Stress Test: Moderate  Accelerations in Non-Stress Test: Yes  Decelerations in Non-Stress Test: None  Contractions in Non-Stress Test: Not present  Acoustic Stimulator for Non-Stress Test: No  Interpretation of Non-Stress Test   Interpretation of Non-Stress Test: Reactive         Reactive NST.     Marguerite Srinivasan MD  Maternal Fetal Medicine  Director of Fetal Intervention

## 2025-06-13 ENCOUNTER — ROUTINE PRENATAL (OUTPATIENT)
Dept: MATERNAL FETAL MEDICINE | Facility: CLINIC | Age: 35
End: 2025-06-13
Payer: COMMERCIAL

## 2025-06-13 ENCOUNTER — HOSPITAL ENCOUNTER (OUTPATIENT)
Dept: RADIOLOGY | Facility: CLINIC | Age: 35
Discharge: HOME | End: 2025-06-13
Payer: COMMERCIAL

## 2025-06-13 ENCOUNTER — APPOINTMENT (OUTPATIENT)
Dept: OBSTETRICS AND GYNECOLOGY | Facility: CLINIC | Age: 35
End: 2025-06-13
Payer: COMMERCIAL

## 2025-06-13 ENCOUNTER — APPOINTMENT (OUTPATIENT)
Dept: MATERNAL FETAL MEDICINE | Facility: CLINIC | Age: 35
End: 2025-06-13
Payer: COMMERCIAL

## 2025-06-13 VITALS — DIASTOLIC BLOOD PRESSURE: 67 MMHG | SYSTOLIC BLOOD PRESSURE: 110 MMHG | BODY MASS INDEX: 24.19 KG/M2 | WEIGHT: 128 LBS

## 2025-06-13 DIAGNOSIS — Z3A.32 32 WEEKS GESTATION OF PREGNANCY (HHS-HCC): Primary | ICD-10-CM

## 2025-06-13 DIAGNOSIS — O09.813 PREGNANCY RESULTING FROM IN VITRO FERTILIZATION IN THIRD TRIMESTER (HHS-HCC): ICD-10-CM

## 2025-06-13 DIAGNOSIS — O09.292: ICD-10-CM

## 2025-06-13 DIAGNOSIS — O09.299 HISTORY OF PRE-ECLAMPSIA IN PRIOR PREGNANCY, CURRENTLY PREGNANT (HHS-HCC): ICD-10-CM

## 2025-06-13 DIAGNOSIS — O34.33 CERVICAL INSUFFICIENCY DURING PREGNANCY IN THIRD TRIMESTER, ANTEPARTUM: ICD-10-CM

## 2025-06-13 DIAGNOSIS — Z32.00 PREGNANCY EXAMINATION OR TEST, PREGNANCY UNCONFIRMED: ICD-10-CM

## 2025-06-13 PROCEDURE — 99214 OFFICE O/P EST MOD 30 MIN: CPT | Performed by: STUDENT IN AN ORGANIZED HEALTH CARE EDUCATION/TRAINING PROGRAM

## 2025-06-13 PROCEDURE — 76816 OB US FOLLOW-UP PER FETUS: CPT

## 2025-06-13 PROCEDURE — 76819 FETAL BIOPHYS PROFIL W/O NST: CPT

## 2025-06-13 ASSESSMENT — ENCOUNTER SYMPTOMS
HEMATOLOGIC/LYMPHATIC NEGATIVE: 0
PSYCHIATRIC NEGATIVE: 0
ENDOCRINE NEGATIVE: 0
RESPIRATORY NEGATIVE: 0
GASTROINTESTINAL NEGATIVE: 0
EYES NEGATIVE: 0
NEUROLOGICAL NEGATIVE: 0
CARDIOVASCULAR NEGATIVE: 0
MUSCULOSKELETAL NEGATIVE: 0
CONSTITUTIONAL NEGATIVE: 0
ALLERGIC/IMMUNOLOGIC NEGATIVE: 0

## 2025-06-13 NOTE — ASSESSMENT & PLAN NOTE
Dating:   [x] Initial BMI: 22.12  [x] Prenatal Labs: UTD  [x] Aneuploidy Screening: risk reducing   [x] Baby ASA  [x] Anatomy US:   [x] 1hr GCT at 24-28wks: deferred, normal glucose readings   [x] 2nd trimester labs: wnl  [x] Tdap (27-36wks): given   [x] Flu Shot; not in season  [] COVID vaccine: declined   [x] RSV vaccine: not in season  [x] Rhogam (if Rh neg): O-, given last visit  [] GBS at 36 wks:   [] Feeding:   [] PPBC:   [] 39 weeks discussion of IOL vs. Expectant management:   [] Mode of delivery:    - Growth  EFW 38%I, AC 30%. AGA  - Continue weekly NSTs

## 2025-06-13 NOTE — PROGRESS NOTES
[facility] Follow-up  2025   11:56 AM     SUBJECTIVE    HPI: Elidia Lindsay is a 34 y.o.  at 32w0d here for RPNV. Denies contractions, bleeding, or LOF. Denies HA, CP, SOB. Reports normal fetal movement.     Patient reports light cramping/stretching this week in her lower pelvis that comes and goes likely 2/2 to cerclage, not uncomfortable, unconcerned to her, defers examination. Reports some cerclage related discharge but nothing concerning to her, regarding cerclage placement at this time. Denies any other concerns.    OBJECTIVE    Visit Vitals  /67 Comment: HR:108   Wt 58.1 kg (128 lb)   BMI 24.19 kg/m²   OB Status Pregnant   Smoking Status Never   BSA 1.58 m²        FHT: US today with growth AGA, +FHR    General: alert, oriented, pleasant, no distress   Cardiac: warm, well perfused   Respiratory: regular rhythm breathing  Abdominal: Soft, gravid    ASSESSMENT & PLAN    Elidia Lindsay is a 34 y.o.  at 32w0d here for the following concerns we addressed today:    Assessment & Plan  32 weeks gestation of pregnancy ([facility])  Dating:   [x] Initial BMI: 22.12  [x] Prenatal Labs: UTD  [x] Aneuploidy Screening: risk reducing   [x] Baby ASA  [x] Anatomy US:   [x] 1hr GCT at 24-28wks: deferred, normal glucose readings   [x] 2nd trimester labs: wnl  [x] Tdap (27-36wks): given   [x] Flu Shot; not in season  [] COVID vaccine: declined   [x] RSV vaccine: not in season  [x] Rhogam (if Rh neg): O-, given last visit  [] GBS at 36 wks:   [] Feeding:   [] PPBC:   [] 39 weeks discussion of IOL vs. Expectant management:   [] Mode of delivery:    - Growth  EFW 38%I, AC 30%. AGA  - Continue weekly NSTs       Cervical insufficiency during pregnancy in third trimester, antepartum  -History indicated cerclage in place  - Growth today AGA.  - Repeat growth in 4 weeks  - Cerclage removal recommended at 36-37 weeks.  -Weekly NSTs        History of pre-eclampsia in prior pregnancy, currently pregnant ([facility])  -  Normotensive to date this pregnancy  -110/67 in office 6/13  - Counseled to continue to monitor blood pressure at home, and to watch out of symptoms like HA, SOB, CP, vision changes as pregnancy continues.        History of retained placenta in prior pregnancy, currently pregnant in second trimester ([facility])  - Last delivery required manual placental extraction and had uterine atony requiring a Thais. Treated for presumed IAI.   - Plan for type and cross at delivery and post placental scan to confirm complete placental removal.        Pregnancy resulting from in vitro fertilization in third trimester ([facility])  -Growth as above         RTC in 2 weeks for PNV, weekly for NSTs    Patient seen and evaluated with Dr. Khalida Easton MS4  [facility]    I, or a resident under my supervision, was present with the medical student who participated in the documentation of this note.  I have personally seen and examined the patient and performed the medical decision-making components. I have reviewed the medical student documentation and/or resident documentation and verified the findings in the note as written with additions or exceptions as stated in the body of the note.    Lexie Gaxiola MD

## 2025-06-13 NOTE — ASSESSMENT & PLAN NOTE
-History indicated cerclage in place  - Growth today AGA.  - Repeat growth in 4 weeks  - Cerclage removal recommended at 36-37 weeks.  -Weekly NSTs

## 2025-06-13 NOTE — ASSESSMENT & PLAN NOTE
- Normotensive to date this pregnancy  -110/67 in office 6/13  - Counseled to continue to monitor blood pressure at home, and to watch out of symptoms like HA, SOB, CP, vision changes as pregnancy continues.

## 2025-06-20 ENCOUNTER — APPOINTMENT (OUTPATIENT)
Dept: OBSTETRICS AND GYNECOLOGY | Facility: CLINIC | Age: 35
End: 2025-06-20
Payer: COMMERCIAL

## 2025-06-24 ENCOUNTER — ROUTINE PRENATAL (OUTPATIENT)
Dept: MATERNAL FETAL MEDICINE | Facility: CLINIC | Age: 35
End: 2025-06-24
Payer: COMMERCIAL

## 2025-06-24 ENCOUNTER — APPOINTMENT (OUTPATIENT)
Dept: OBSTETRICS AND GYNECOLOGY | Facility: CLINIC | Age: 35
End: 2025-06-24
Payer: COMMERCIAL

## 2025-06-24 VITALS — DIASTOLIC BLOOD PRESSURE: 76 MMHG | BODY MASS INDEX: 24 KG/M2 | WEIGHT: 127 LBS | SYSTOLIC BLOOD PRESSURE: 120 MMHG

## 2025-06-24 DIAGNOSIS — Z3A.33 33 WEEKS GESTATION OF PREGNANCY (HHS-HCC): ICD-10-CM

## 2025-06-24 DIAGNOSIS — O09.299 HISTORY OF GESTATIONAL DIABETES IN PRIOR PREGNANCY, CURRENTLY PREGNANT (HHS-HCC): ICD-10-CM

## 2025-06-24 DIAGNOSIS — O34.33 CERVICAL INSUFFICIENCY DURING PREGNANCY IN THIRD TRIMESTER, ANTEPARTUM: Primary | ICD-10-CM

## 2025-06-24 DIAGNOSIS — O09.299 HISTORY OF PRE-ECLAMPSIA IN PRIOR PREGNANCY, CURRENTLY PREGNANT (HHS-HCC): ICD-10-CM

## 2025-06-24 DIAGNOSIS — Z86.32 HISTORY OF GESTATIONAL DIABETES IN PRIOR PREGNANCY, CURRENTLY PREGNANT (HHS-HCC): ICD-10-CM

## 2025-06-24 DIAGNOSIS — O09.813 PREGNANCY RESULTING FROM IN VITRO FERTILIZATION IN THIRD TRIMESTER (HHS-HCC): ICD-10-CM

## 2025-06-24 DIAGNOSIS — O09.292: ICD-10-CM

## 2025-06-24 PROCEDURE — 99214 OFFICE O/P EST MOD 30 MIN: CPT | Mod: 25 | Performed by: STUDENT IN AN ORGANIZED HEALTH CARE EDUCATION/TRAINING PROGRAM

## 2025-06-24 PROCEDURE — 59025 FETAL NON-STRESS TEST: CPT | Performed by: STUDENT IN AN ORGANIZED HEALTH CARE EDUCATION/TRAINING PROGRAM

## 2025-06-24 PROCEDURE — 99214 OFFICE O/P EST MOD 30 MIN: CPT | Performed by: STUDENT IN AN ORGANIZED HEALTH CARE EDUCATION/TRAINING PROGRAM

## 2025-06-24 PROCEDURE — 81003 URINALYSIS AUTO W/O SCOPE: CPT | Mod: QW | Performed by: STUDENT IN AN ORGANIZED HEALTH CARE EDUCATION/TRAINING PROGRAM

## 2025-06-24 ASSESSMENT — ENCOUNTER SYMPTOMS
MUSCULOSKELETAL NEGATIVE: 0
CARDIOVASCULAR NEGATIVE: 0
HEMATOLOGIC/LYMPHATIC NEGATIVE: 0
NEUROLOGICAL NEGATIVE: 0
RESPIRATORY NEGATIVE: 0
GASTROINTESTINAL NEGATIVE: 0
CONSTITUTIONAL NEGATIVE: 0
ENDOCRINE NEGATIVE: 0
ALLERGIC/IMMUNOLOGIC NEGATIVE: 0
PSYCHIATRIC NEGATIVE: 0
EYES NEGATIVE: 0

## 2025-06-24 NOTE — ASSESSMENT & PLAN NOTE
Up to date on care  Will plan cerclage removal at next visit   Orders:    POCT UA Automated manually resulted

## 2025-06-24 NOTE — ASSESSMENT & PLAN NOTE
- Normotensive to date this pregnancy  - Counseled to continue to monitor blood pressure at home, and to watch out of symptoms like HA, SOB, CP, vision changes as pregnancy continues.

## 2025-06-24 NOTE — PROGRESS NOTES
MFM Follow-up  2025   4:32 PM     SUBJECTIVE    HPI: Elidia Lindsay is a 35 y.o.  at 33w4d here for RPNV. Endorses some pelvic pain and contractions here and there, denies bleeding, or LOF. Reports normal fetal movement.     OBJECTIVE    Visit Vitals  /76   Wt 57.6 kg (127 lb)   BMI 24.00 kg/m²   OB Status Pregnant   Smoking Status Never   BSA 1.57 m²   Gen: NAD  Pulm: normal respiratory effort  CV: regular rate, well perfused  Abd: soft, gravid, nontender  SVE: closed, cerclage stitch in place not on tension   Ext: no edema   NST    moderate accelerations present, no decelerations  [place] quiet  Interpretation reactive     ASSESSMENT & PLAN    Elidia Lindsay is a 35 y.o.  at 33w4d here for the following concerns we addressed today:    Assessment & Plan  33 weeks gestation of pregnancy ([facility])  Up to date on care  Will plan cerclage removal at next visit   Orders:    POCT UA Automated manually resulted    Cervical insufficiency during pregnancy in third trimester, antepartum  -History indicated cerclage in place  - NST reactive, continue weekly NSTs   - Cerclage removal at next visit   - growth in 2 weeks          History of pre-eclampsia in prior pregnancy, currently pregnant ([facility])  - Normotensive to date this pregnancy  - Counseled to continue to monitor blood pressure at home, and to watch out of symptoms like HA, SOB, CP, vision changes as pregnancy continues.          History of retained placenta in prior pregnancy, currently pregnant in second trimester ([facility])  - Last delivery required manual placental extraction and had uterine atony requiring a Thais. Treated for presumed IAI.   - Plan for type and cross at delivery and post placental scan to confirm complete placental removal.        Pregnancy resulting from in vitro fertilization in third trimester ([facility])  -Growth as above         RTC in 2 weeks for PNV and growth US, weekly for NSTs    Lexie Gaxiola MD

## 2025-06-24 NOTE — ASSESSMENT & PLAN NOTE
-History indicated cerclage in place  - NST reactive, continue weekly NSTs   - Cerclage removal at next visit   - growth in 2 weeks

## 2025-07-01 ENCOUNTER — PROCEDURE VISIT (OUTPATIENT)
Dept: OBSTETRICS AND GYNECOLOGY | Facility: CLINIC | Age: 35
End: 2025-07-01
Payer: COMMERCIAL

## 2025-07-01 ENCOUNTER — APPOINTMENT (OUTPATIENT)
Dept: OBSTETRICS AND GYNECOLOGY | Facility: CLINIC | Age: 35
End: 2025-07-01
Payer: COMMERCIAL

## 2025-07-01 VITALS — DIASTOLIC BLOOD PRESSURE: 82 MMHG | SYSTOLIC BLOOD PRESSURE: 120 MMHG

## 2025-07-01 DIAGNOSIS — O09.813 PREGNANCY RESULTING FROM IN VITRO FERTILIZATION IN THIRD TRIMESTER (HHS-HCC): Primary | ICD-10-CM

## 2025-07-01 PROCEDURE — 59025 FETAL NON-STRESS TEST: CPT | Performed by: STUDENT IN AN ORGANIZED HEALTH CARE EDUCATION/TRAINING PROGRAM

## 2025-07-01 NOTE — PROCEDURES
Elidia Lindsay, a  at 34w4d with an GEOVANNY of 2025, by [employer], was seen at  [facility] for a nonstress test.    Non-Stress Test   Baseline Fetal Heart Rate for Non-Stress Test: 135 BPM  Variability in Waveform for Non-Stress Test: Moderate  Accelerations in Non-Stress Test: Yes  Decelerations in Non-Stress Test: None  Contractions in Non-Stress Test: Irregular (irritability)  Interpretation of Non-Stress Test   Interpretation of Non-Stress Test: Reactive

## 2025-07-08 ENCOUNTER — HOSPITAL ENCOUNTER (OUTPATIENT)
Dept: RADIOLOGY | Facility: CLINIC | Age: 35
Discharge: HOME | End: 2025-07-08
Payer: COMMERCIAL

## 2025-07-08 ENCOUNTER — ROUTINE PRENATAL (OUTPATIENT)
Dept: MATERNAL FETAL MEDICINE | Facility: CLINIC | Age: 35
End: 2025-07-08
Payer: COMMERCIAL

## 2025-07-08 VITALS — SYSTOLIC BLOOD PRESSURE: 124 MMHG | BODY MASS INDEX: 24.75 KG/M2 | WEIGHT: 131 LBS | DIASTOLIC BLOOD PRESSURE: 82 MMHG

## 2025-07-08 DIAGNOSIS — O09.292: ICD-10-CM

## 2025-07-08 DIAGNOSIS — O09.813 PREGNANCY RESULTING FROM IN VITRO FERTILIZATION IN THIRD TRIMESTER (HHS-HCC): ICD-10-CM

## 2025-07-08 DIAGNOSIS — Z3A.35 35 WEEKS GESTATION OF PREGNANCY (HHS-HCC): ICD-10-CM

## 2025-07-08 DIAGNOSIS — O09.03 SUPERVISION OF PREGNANCY WITH HISTORY OF INFERTILITY, THIRD TRIMESTER: ICD-10-CM

## 2025-07-08 DIAGNOSIS — O26.893 RH NEGATIVE STATUS DURING PREGNANCY IN THIRD TRIMESTER (HHS-HCC): ICD-10-CM

## 2025-07-08 DIAGNOSIS — E07.9 THYROID DISEASE DURING PREGNANCY IN THIRD TRIMESTER (HHS-HCC): ICD-10-CM

## 2025-07-08 DIAGNOSIS — O09.93 SUPERVISION OF HIGH RISK PREGNANCY IN THIRD TRIMESTER (HHS-HCC): ICD-10-CM

## 2025-07-08 DIAGNOSIS — Z32.00 PREGNANCY EXAMINATION OR TEST, PREGNANCY UNCONFIRMED: ICD-10-CM

## 2025-07-08 DIAGNOSIS — Z67.91 RH NEGATIVE STATUS DURING PREGNANCY IN THIRD TRIMESTER (HHS-HCC): ICD-10-CM

## 2025-07-08 DIAGNOSIS — O34.33 CERVICAL INSUFFICIENCY DURING PREGNANCY IN THIRD TRIMESTER, ANTEPARTUM: ICD-10-CM

## 2025-07-08 DIAGNOSIS — Z86.32 HISTORY OF GESTATIONAL DIABETES IN PRIOR PREGNANCY, CURRENTLY PREGNANT (HHS-HCC): Primary | ICD-10-CM

## 2025-07-08 DIAGNOSIS — O09.299 HISTORY OF PRE-ECLAMPSIA IN PRIOR PREGNANCY, CURRENTLY PREGNANT (HHS-HCC): ICD-10-CM

## 2025-07-08 DIAGNOSIS — O99.283 THYROID DISEASE DURING PREGNANCY IN THIRD TRIMESTER (HHS-HCC): ICD-10-CM

## 2025-07-08 DIAGNOSIS — O09.299 HISTORY OF GESTATIONAL DIABETES IN PRIOR PREGNANCY, CURRENTLY PREGNANT (HHS-HCC): Primary | ICD-10-CM

## 2025-07-08 LAB
POC APPEARANCE, URINE: CLEAR
POC BILIRUBIN, URINE: NEGATIVE
POC BLOOD, URINE: NEGATIVE
POC COLOR, URINE: YELLOW
POC GLUCOSE, URINE: NEGATIVE MG/DL
POC KETONES, URINE: NEGATIVE MG/DL
POC LEUKOCYTES, URINE: NEGATIVE
POC NITRITE,URINE: NEGATIVE
POC PH, URINE: 6 PH
POC PROTEIN, URINE: NEGATIVE MG/DL
POC SPECIFIC GRAVITY, URINE: 1.02
POC UROBILINOGEN, URINE: 0.2 EU/DL

## 2025-07-08 PROCEDURE — 76819 FETAL BIOPHYS PROFIL W/O NST: CPT

## 2025-07-08 PROCEDURE — 81003 URINALYSIS AUTO W/O SCOPE: CPT | Performed by: STUDENT IN AN ORGANIZED HEALTH CARE EDUCATION/TRAINING PROGRAM

## 2025-07-08 PROCEDURE — 99214 OFFICE O/P EST MOD 30 MIN: CPT | Performed by: STUDENT IN AN ORGANIZED HEALTH CARE EDUCATION/TRAINING PROGRAM

## 2025-07-08 PROCEDURE — 76816 OB US FOLLOW-UP PER FETUS: CPT

## 2025-07-08 PROCEDURE — 99214 OFFICE O/P EST MOD 30 MIN: CPT | Mod: 25 | Performed by: STUDENT IN AN ORGANIZED HEALTH CARE EDUCATION/TRAINING PROGRAM

## 2025-07-08 ASSESSMENT — ENCOUNTER SYMPTOMS
MUSCULOSKELETAL NEGATIVE: 0
GASTROINTESTINAL NEGATIVE: 0
RESPIRATORY NEGATIVE: 0
PSYCHIATRIC NEGATIVE: 0
CONSTITUTIONAL NEGATIVE: 0
EYES NEGATIVE: 0
ENDOCRINE NEGATIVE: 0
NEUROLOGICAL NEGATIVE: 0
ALLERGIC/IMMUNOLOGIC NEGATIVE: 0
CARDIOVASCULAR NEGATIVE: 0
HEMATOLOGIC/LYMPHATIC NEGATIVE: 0

## 2025-07-08 NOTE — ASSESSMENT & PLAN NOTE
Up to date on care  Cerclage removal today  GBS collected  Will schedule for IOL at 39 weeks    Orders:    POCT UA Automated manually resulted

## 2025-07-08 NOTE — PROGRESS NOTES
MFM Follow-up  2025   2:45 PM     SUBJECTIVE    HPI: Elidia Lindsay is a 35 y.o.  at 35w4d here for RPNV. Endorses occasional contractions, denies bleeding, or LOF. Reports normal fetal movement. Patient reports some pain and discomfort with cerclage, sharp pain at times.     OBJECTIVE    Visit Vitals  /82   Wt 59.4 kg (131 lb)   BMI 24.75 kg/m²   OB Status Pregnant   Smoking Status Never   BSA 1.6 m²   Gen: NAD  Pulm: normal respiratory effort  CV: regular rate, well perfused  Abd: soft, gravid, nontender  Ext: no edema   Ultrasound Growth AGA    ASSESSMENT & PLAN    Elidia Lindsay is a 35 y.o.  at 35w4d here for the following concerns we addressed today:    Assessment & Plan  35 weeks gestation of pregnancy ([facility])  Up to date on care  Cerclage removal today  GBS collected  Will schedule for IOL at 39 weeks    Orders:    POCT UA Automated manually resulted    History of gestational diabetes in prior pregnancy, currently pregnant ([facility])  -Had normal glucose values on pattern screening, will defer 1h GTT and presume no GDM        Thyroid disease during pregnancy in third trimester ([facility])  -  TSH 1.62, WNL       Cervical insufficiency during pregnancy in third trimester, antepartum  -History indicated cerclage REMOVED completely today  - continue weekly NSTs   - growth today AGA         History of pre-eclampsia in prior pregnancy, currently pregnant ([facility])  - Normotensive to date this pregnancy  - Counseled to continue to monitor blood pressure at home, and to watch out of symptoms like HA, SOB, CP, vision changes as pregnancy continues.        Pregnancy resulting from in vitro fertilization in third trimester ([facility])  -Growth today  -weekly NSTs until delivery          Rh negative status during pregnancy in third trimester ([facility])  - S/p rhogam   -  T&S O neg, antibody neg       History of retained placenta in prior pregnancy, currently pregnant in second trimester  ([facility])  - Last delivery required manual placental extraction and had uterine atony requiring a Thais. Treated for presumed IAI.   - Plan for type and cross at delivery and post placental scan to confirm complete placental removal.          RTC in 1 week    Lexie Gaxiola MD

## 2025-07-08 NOTE — ASSESSMENT & PLAN NOTE
-History indicated cerclage REMOVED completely today  - continue weekly NSTs   - growth today AGA

## 2025-07-11 LAB — GP B STREP SPEC QL CULT: NORMAL

## 2025-07-14 DIAGNOSIS — O09.813 PREGNANCY RESULTING FROM IN VITRO FERTILIZATION IN THIRD TRIMESTER (HHS-HCC): ICD-10-CM

## 2025-07-14 DIAGNOSIS — O09.299 PREGNANCY WITH POOR OBSTETRIC HISTORY (HHS-HCC): ICD-10-CM

## 2025-07-15 ENCOUNTER — APPOINTMENT (OUTPATIENT)
Dept: MATERNAL FETAL MEDICINE | Facility: CLINIC | Age: 35
End: 2025-07-15
Payer: COMMERCIAL

## 2025-07-15 ENCOUNTER — ROUTINE PRENATAL (OUTPATIENT)
Dept: MATERNAL FETAL MEDICINE | Facility: CLINIC | Age: 35
End: 2025-07-15
Payer: COMMERCIAL

## 2025-07-15 ENCOUNTER — PROCEDURE VISIT (OUTPATIENT)
Dept: OBSTETRICS AND GYNECOLOGY | Facility: CLINIC | Age: 35
End: 2025-07-15
Payer: COMMERCIAL

## 2025-07-15 ENCOUNTER — APPOINTMENT (OUTPATIENT)
Dept: OBSTETRICS AND GYNECOLOGY | Facility: CLINIC | Age: 35
End: 2025-07-15
Payer: COMMERCIAL

## 2025-07-15 VITALS — BODY MASS INDEX: 24.56 KG/M2 | DIASTOLIC BLOOD PRESSURE: 83 MMHG | SYSTOLIC BLOOD PRESSURE: 118 MMHG | WEIGHT: 130 LBS

## 2025-07-15 DIAGNOSIS — O09.813 PREGNANCY RESULTING FROM IN VITRO FERTILIZATION IN THIRD TRIMESTER (HHS-HCC): ICD-10-CM

## 2025-07-15 DIAGNOSIS — Z3A.36 36 WEEKS GESTATION OF PREGNANCY (HHS-HCC): ICD-10-CM

## 2025-07-15 DIAGNOSIS — O26.893 RH NEGATIVE STATUS DURING PREGNANCY IN THIRD TRIMESTER (HHS-HCC): ICD-10-CM

## 2025-07-15 DIAGNOSIS — O09.813 PREGNANCY RESULTING FROM IN VITRO FERTILIZATION IN THIRD TRIMESTER (HHS-HCC): Primary | ICD-10-CM

## 2025-07-15 DIAGNOSIS — O09.292: ICD-10-CM

## 2025-07-15 DIAGNOSIS — O34.33 CERVICAL INSUFFICIENCY DURING PREGNANCY IN THIRD TRIMESTER, ANTEPARTUM: ICD-10-CM

## 2025-07-15 DIAGNOSIS — O09.299 HISTORY OF PRE-ECLAMPSIA IN PRIOR PREGNANCY, CURRENTLY PREGNANT (HHS-HCC): ICD-10-CM

## 2025-07-15 DIAGNOSIS — Z67.91 RH NEGATIVE STATUS DURING PREGNANCY IN THIRD TRIMESTER (HHS-HCC): ICD-10-CM

## 2025-07-15 PROCEDURE — 99214 OFFICE O/P EST MOD 30 MIN: CPT | Performed by: STUDENT IN AN ORGANIZED HEALTH CARE EDUCATION/TRAINING PROGRAM

## 2025-07-15 PROCEDURE — 0501F PRENATAL FLOW SHEET: CPT | Performed by: STUDENT IN AN ORGANIZED HEALTH CARE EDUCATION/TRAINING PROGRAM

## 2025-07-15 PROCEDURE — 59025 FETAL NON-STRESS TEST: CPT | Performed by: STUDENT IN AN ORGANIZED HEALTH CARE EDUCATION/TRAINING PROGRAM

## 2025-07-15 ASSESSMENT — ENCOUNTER SYMPTOMS
ENDOCRINE NEGATIVE: 0
CARDIOVASCULAR NEGATIVE: 0
CONSTITUTIONAL NEGATIVE: 0
RESPIRATORY NEGATIVE: 0
MUSCULOSKELETAL NEGATIVE: 0
ALLERGIC/IMMUNOLOGIC NEGATIVE: 0
NEUROLOGICAL NEGATIVE: 0
GASTROINTESTINAL NEGATIVE: 0
PSYCHIATRIC NEGATIVE: 0
HEMATOLOGIC/LYMPHATIC NEGATIVE: 0
EYES NEGATIVE: 0

## 2025-07-15 NOTE — ASSESSMENT & PLAN NOTE
-Up to date on routine care. No GDM.   -Scheduled for 39 week induction but interested in 37+ week membrane sweeps  -GBS negative    Orders:    POCT UA Automated manually resulted  Unable to void, no specimen left

## 2025-07-15 NOTE — ASSESSMENT & PLAN NOTE
-Plan for type and cross at delivery and ultrasound to confirm complete placental removal at delivery

## 2025-07-15 NOTE — PROGRESS NOTES
[facility] Follow-up  7/15/2025     SUBJECTIVE    HPI: Elidia Lindsay is a 35 y.o.  at 36w4d here for RPNV. Denies regular painful contractions, bleeding, or LOF. Reports normal fetal movement. Patient reports she is ready for delivery. She is checking her blood pressures at home and notes that they're normal.     OBJECTIVE    Visit Vitals  /83   Wt 59 kg (130 lb)   BMI 24.56 kg/m²   OB Status Pregnant   Smoking Status Never   BSA 1.59 m²      NST: baseline 115 BPM, moderate variability, accelerations present, decelerations absent  Uterine irritability  Reactive    ASSESSMENT & PLAN    Elidia Lindasy is a 35 y.o.  at 36w4d here for the following concerns we addressed today:    Assessment & Plan  Pregnancy resulting from in vitro fertilization in third trimester ([facility])  -Normal fetal growth  -Continue weekly NSTs until delivery at 39 weeks        Cervical insufficiency during pregnancy in third trimester, antepartum  -S/p cerclage removal        History of pre-eclampsia in prior pregnancy, currently pregnant ([facility])  -Aware of signs/symptoms of recurrence       History of retained placenta in prior pregnancy, currently pregnant in second trimester ([facility])  -Plan for type and cross at delivery and ultrasound to confirm complete placental removal at delivery        36 weeks gestation of pregnancy ([facility])  Rh negative status during pregnancy in third trimester ([facility])  -Up to date on routine care. No GDM.   -Scheduled for 39 week induction but interested in 37+ week membrane sweeps  -GBS negative    Orders:    POCT UA Automated manually resulted  Unable to void, no specimen left     RTC weekly until delivery    [place] MD

## 2025-07-22 ENCOUNTER — PROCEDURE VISIT (OUTPATIENT)
Dept: OBSTETRICS AND GYNECOLOGY | Facility: CLINIC | Age: 35
End: 2025-07-22
Payer: COMMERCIAL

## 2025-07-22 ENCOUNTER — ROUTINE PRENATAL (OUTPATIENT)
Dept: MATERNAL FETAL MEDICINE | Facility: CLINIC | Age: 35
End: 2025-07-22
Payer: COMMERCIAL

## 2025-07-22 ENCOUNTER — APPOINTMENT (OUTPATIENT)
Dept: MATERNAL FETAL MEDICINE | Facility: CLINIC | Age: 35
End: 2025-07-22
Payer: COMMERCIAL

## 2025-07-22 ENCOUNTER — APPOINTMENT (OUTPATIENT)
Dept: OBSTETRICS AND GYNECOLOGY | Facility: CLINIC | Age: 35
End: 2025-07-22
Payer: COMMERCIAL

## 2025-07-22 VITALS — BODY MASS INDEX: 24.73 KG/M2 | SYSTOLIC BLOOD PRESSURE: 125 MMHG | WEIGHT: 130.9 LBS | DIASTOLIC BLOOD PRESSURE: 86 MMHG

## 2025-07-22 DIAGNOSIS — O34.33 CERVICAL INSUFFICIENCY DURING PREGNANCY IN THIRD TRIMESTER, ANTEPARTUM: ICD-10-CM

## 2025-07-22 DIAGNOSIS — O09.813 PREGNANCY RESULTING FROM IN VITRO FERTILIZATION IN THIRD TRIMESTER (HHS-HCC): ICD-10-CM

## 2025-07-22 DIAGNOSIS — O99.283 THYROID DISEASE DURING PREGNANCY IN THIRD TRIMESTER (HHS-HCC): ICD-10-CM

## 2025-07-22 DIAGNOSIS — O09.292: ICD-10-CM

## 2025-07-22 DIAGNOSIS — E07.9 THYROID DISEASE DURING PREGNANCY IN THIRD TRIMESTER (HHS-HCC): ICD-10-CM

## 2025-07-22 DIAGNOSIS — O09.299 HISTORY OF PRE-ECLAMPSIA IN PRIOR PREGNANCY, CURRENTLY PREGNANT (HHS-HCC): ICD-10-CM

## 2025-07-22 DIAGNOSIS — O09.299 HISTORY OF GESTATIONAL DIABETES IN PRIOR PREGNANCY, CURRENTLY PREGNANT (HHS-HCC): Primary | ICD-10-CM

## 2025-07-22 DIAGNOSIS — Z86.32 HISTORY OF GESTATIONAL DIABETES IN PRIOR PREGNANCY, CURRENTLY PREGNANT (HHS-HCC): Primary | ICD-10-CM

## 2025-07-22 DIAGNOSIS — Z3A.37 37 WEEKS GESTATION OF PREGNANCY (HHS-HCC): ICD-10-CM

## 2025-07-22 LAB
POC APPEARANCE, URINE: CLEAR
POC BILIRUBIN, URINE: NEGATIVE
POC BLOOD, URINE: NEGATIVE
POC COLOR, URINE: YELLOW
POC GLUCOSE, URINE: NEGATIVE MG/DL
POC KETONES, URINE: NEGATIVE MG/DL
POC LEUKOCYTES, URINE: NEGATIVE
POC NITRITE,URINE: NEGATIVE
POC PH, URINE: 6.5 PH
POC PROTEIN, URINE: NEGATIVE MG/DL
POC SPECIFIC GRAVITY, URINE: 1.01
POC UROBILINOGEN, URINE: 0.2 EU/DL

## 2025-07-22 PROCEDURE — 81003 URINALYSIS AUTO W/O SCOPE: CPT | Performed by: STUDENT IN AN ORGANIZED HEALTH CARE EDUCATION/TRAINING PROGRAM

## 2025-07-22 PROCEDURE — 99214 OFFICE O/P EST MOD 30 MIN: CPT | Mod: 25 | Performed by: STUDENT IN AN ORGANIZED HEALTH CARE EDUCATION/TRAINING PROGRAM

## 2025-07-22 PROCEDURE — 59025 FETAL NON-STRESS TEST: CPT | Performed by: STUDENT IN AN ORGANIZED HEALTH CARE EDUCATION/TRAINING PROGRAM

## 2025-07-22 PROCEDURE — 99214 OFFICE O/P EST MOD 30 MIN: CPT | Performed by: STUDENT IN AN ORGANIZED HEALTH CARE EDUCATION/TRAINING PROGRAM

## 2025-07-22 NOTE — PROGRESS NOTES
MFM Follow-up  2025   1:38 PM     SUBJECTIVE    HPI: Elidia Lindsay is a 35 y.o.  at 37w4d here for RPNV. Endorses some contractions, denies bleeding, or LOF. Reports normal fetal movement.     OBJECTIVE    Visit Vitals  /86   Wt 59.4 kg (130 lb 14.4 oz)   BMI 24.73 kg/m²   OB Status Pregnant   Smoking Status Never   BSA 1.6 m²   Gen: NAD  Pulm: normal respiratory effort  CV: regular rate, well perfused  Abd: soft, gravid, nontender  SVE 0/50/-3 soft  Ext: no edema   NST  FHT baseline 130, variability moderate, accelerations present, no decelerations,   [place] irregular contractions  Interpretation reactive    ASSESSMENT & PLAN    Elidia Lindsay is a 35 y.o.  at 37w4d here for the following concerns we addressed today:    Assessment & Plan  37 weeks gestation of pregnancy ([facility])  -Up to date on routine care.   -Scheduled for 39 week induction   -GBS negative  - would like membrane sweep if possible prior to IOL   NST reactive today   Orders:    POCT UA Automated manually resulted    History of gestational diabetes in prior pregnancy, currently pregnant ([facility])  -Had normal glucose values on pattern screening, will defer 1h GTT and presume no GDM        Thyroid disease during pregnancy in third trimester ([facility])  -  TSH 1.62, WNL       History of retained placenta in prior pregnancy, currently pregnant in second trimester ([facility])  -Plan for type and cross at delivery and ultrasound to confirm complete placental removal at delivery        History of pre-eclampsia in prior pregnancy, currently pregnant ([facility])  -Aware of signs/symptoms of recurrence  -normotensive at home       Cervical insufficiency during pregnancy in third trimester, antepartum  -S/p cerclage removal   - closed on examination today       Pregnancy resulting from in vitro fertilization in third trimester ([facility])  -Normal fetal growth  -Continue weekly NSTs until delivery at 39 weeks          RTC in 1  bela Gaxiola MD

## 2025-07-22 NOTE — ASSESSMENT & PLAN NOTE
-Up to date on routine care.   -Scheduled for 39 week induction   -GBS negative  - would like membrane sweep if possible prior to IOL   NST reactive today   Orders:    POCT UA Automated manually resulted

## 2025-07-28 PROBLEM — Z3A.38 38 WEEKS GESTATION OF PREGNANCY (HHS-HCC): Status: ACTIVE | Noted: 2025-01-22

## 2025-07-29 ENCOUNTER — HOSPITAL ENCOUNTER (INPATIENT)
Facility: HOSPITAL | Age: 35
LOS: 2 days | Discharge: HOME | End: 2025-07-31
Attending: OBSTETRICS & GYNECOLOGY
Payer: COMMERCIAL

## 2025-07-29 ENCOUNTER — ROUTINE PRENATAL (OUTPATIENT)
Dept: MATERNAL FETAL MEDICINE | Facility: CLINIC | Age: 35
End: 2025-07-29
Payer: COMMERCIAL

## 2025-07-29 ENCOUNTER — ANESTHESIA (OUTPATIENT)
Dept: OBSTETRICS AND GYNECOLOGY | Facility: HOSPITAL | Age: 35
End: 2025-07-29
Payer: COMMERCIAL

## 2025-07-29 ENCOUNTER — PROCEDURE VISIT (OUTPATIENT)
Dept: OBSTETRICS AND GYNECOLOGY | Facility: CLINIC | Age: 35
End: 2025-07-29
Payer: COMMERCIAL

## 2025-07-29 ENCOUNTER — ANESTHESIA EVENT (OUTPATIENT)
Dept: OBSTETRICS AND GYNECOLOGY | Facility: HOSPITAL | Age: 35
End: 2025-07-29
Payer: COMMERCIAL

## 2025-07-29 ENCOUNTER — APPOINTMENT (OUTPATIENT)
Dept: OBSTETRICS AND GYNECOLOGY | Facility: CLINIC | Age: 35
End: 2025-07-29
Payer: COMMERCIAL

## 2025-07-29 VITALS — WEIGHT: 133 LBS | SYSTOLIC BLOOD PRESSURE: 129 MMHG | DIASTOLIC BLOOD PRESSURE: 87 MMHG | BODY MASS INDEX: 25.13 KG/M2

## 2025-07-29 DIAGNOSIS — O99.283 THYROID DISEASE DURING PREGNANCY IN THIRD TRIMESTER (HHS-HCC): ICD-10-CM

## 2025-07-29 DIAGNOSIS — O09.299 HISTORY OF GESTATIONAL DIABETES IN PRIOR PREGNANCY, CURRENTLY PREGNANT (HHS-HCC): Primary | ICD-10-CM

## 2025-07-29 DIAGNOSIS — O09.299 PREGNANCY WITH POOR OBSTETRIC HISTORY (HHS-HCC): ICD-10-CM

## 2025-07-29 DIAGNOSIS — E07.9 THYROID DISEASE DURING PREGNANCY IN THIRD TRIMESTER (HHS-HCC): ICD-10-CM

## 2025-07-29 DIAGNOSIS — O09.813 PREGNANCY RESULTING FROM IN VITRO FERTILIZATION IN THIRD TRIMESTER (HHS-HCC): ICD-10-CM

## 2025-07-29 DIAGNOSIS — O34.33 CERVICAL INSUFFICIENCY DURING PREGNANCY IN THIRD TRIMESTER, ANTEPARTUM: ICD-10-CM

## 2025-07-29 DIAGNOSIS — Z3A.38 38 WEEKS GESTATION OF PREGNANCY (HHS-HCC): ICD-10-CM

## 2025-07-29 DIAGNOSIS — O09.292: ICD-10-CM

## 2025-07-29 DIAGNOSIS — O09.299 HISTORY OF PRE-ECLAMPSIA IN PRIOR PREGNANCY, CURRENTLY PREGNANT (HHS-HCC): ICD-10-CM

## 2025-07-29 DIAGNOSIS — Z3A.38 38 WEEKS GESTATION OF PREGNANCY (HHS-HCC): Primary | ICD-10-CM

## 2025-07-29 DIAGNOSIS — Z86.32 HISTORY OF GESTATIONAL DIABETES IN PRIOR PREGNANCY, CURRENTLY PREGNANT (HHS-HCC): Primary | ICD-10-CM

## 2025-07-29 PROBLEM — O16.3 ELEVATED BLOOD PRESSURE AFFECTING PREGNANCY IN THIRD TRIMESTER, ANTEPARTUM: Status: ACTIVE | Noted: 2025-07-29

## 2025-07-29 LAB
ALBUMIN SERPL BCP-MCNC: 3.5 G/DL (ref 3.4–5)
ALP SERPL-CCNC: 155 U/L (ref 33–110)
ALT SERPL W P-5'-P-CCNC: 11 U/L (ref 7–45)
ANION GAP SERPL CALC-SCNC: 17 MMOL/L (ref 10–20)
AST SERPL W P-5'-P-CCNC: 20 U/L (ref 9–39)
BILIRUB SERPL-MCNC: 0.5 MG/DL (ref 0–1.2)
BUN SERPL-MCNC: 18 MG/DL (ref 6–23)
CALCIUM SERPL-MCNC: 9.5 MG/DL (ref 8.6–10.6)
CHLORIDE SERPL-SCNC: 105 MMOL/L (ref 98–107)
CO2 SERPL-SCNC: 17 MMOL/L (ref 21–32)
CREAT SERPL-MCNC: 0.79 MG/DL (ref 0.5–1.05)
CREAT UR-MCNC: 21.6 MG/DL (ref 20–320)
EGFRCR SERPLBLD CKD-EPI 2021: >90 ML/MIN/1.73M*2
ERYTHROCYTE [DISTWIDTH] IN BLOOD BY AUTOMATED COUNT: 14.1 % (ref 11.5–14.5)
GLUCOSE SERPL-MCNC: 70 MG/DL (ref 74–99)
HCT VFR BLD AUTO: 38 % (ref 36–46)
HGB BLD-MCNC: 12.6 G/DL (ref 12–16)
MCH RBC QN AUTO: 30.4 PG (ref 26–34)
MCHC RBC AUTO-ENTMCNC: 33.2 G/DL (ref 32–36)
MCV RBC AUTO: 92 FL (ref 80–100)
NRBC BLD-RTO: 0 /100 WBCS (ref 0–0)
PLATELET # BLD AUTO: 167 X10*3/UL (ref 150–450)
POC APPEARANCE, URINE: CLEAR
POC BILIRUBIN, URINE: NEGATIVE
POC BLOOD, URINE: NEGATIVE
POC COLOR, URINE: YELLOW
POC GLUCOSE, URINE: NEGATIVE MG/DL
POC KETONES, URINE: NEGATIVE MG/DL
POC LEUKOCYTES, URINE: NEGATIVE
POC NITRITE,URINE: NEGATIVE
POC PH, URINE: 7 PH
POC PROTEIN, URINE: NEGATIVE MG/DL
POC SPECIFIC GRAVITY, URINE: 1.01
POC UROBILINOGEN, URINE: 0.2 EU/DL
POTASSIUM SERPL-SCNC: 4.2 MMOL/L (ref 3.5–5.3)
PROT SERPL-MCNC: 6.2 G/DL (ref 6.4–8.2)
PROT UR-ACNC: 8 MG/DL (ref 5–24)
PROT/CREAT UR: 0.37 MG/MG CREAT (ref 0–0.17)
RBC # BLD AUTO: 4.15 X10*6/UL (ref 4–5.2)
SODIUM SERPL-SCNC: 135 MMOL/L (ref 136–145)
TREPONEMA PALLIDUM IGG+IGM AB [PRESENCE] IN SERUM OR PLASMA BY IMMUNOASSAY: NONREACTIVE
WBC # BLD AUTO: 8.7 X10*3/UL (ref 4.4–11.3)

## 2025-07-29 PROCEDURE — 86780 TREPONEMA PALLIDUM: CPT

## 2025-07-29 PROCEDURE — 82570 ASSAY OF URINE CREATININE: CPT

## 2025-07-29 PROCEDURE — 3E0P7VZ INTRODUCTION OF HORMONE INTO FEMALE REPRODUCTIVE, VIA NATURAL OR ARTIFICIAL OPENING: ICD-10-PCS | Performed by: OBSTETRICS & GYNECOLOGY

## 2025-07-29 PROCEDURE — 59050 FETAL MONITOR W/REPORT: CPT

## 2025-07-29 PROCEDURE — 99214 OFFICE O/P EST MOD 30 MIN: CPT

## 2025-07-29 PROCEDURE — 36415 COLL VENOUS BLD VENIPUNCTURE: CPT

## 2025-07-29 PROCEDURE — 84075 ASSAY ALKALINE PHOSPHATASE: CPT

## 2025-07-29 PROCEDURE — 59025 FETAL NON-STRESS TEST: CPT

## 2025-07-29 PROCEDURE — 86850 RBC ANTIBODY SCREEN: CPT

## 2025-07-29 PROCEDURE — 99214 OFFICE O/P EST MOD 30 MIN: CPT | Performed by: STUDENT IN AN ORGANIZED HEALTH CARE EDUCATION/TRAINING PROGRAM

## 2025-07-29 PROCEDURE — 99222 1ST HOSP IP/OBS MODERATE 55: CPT

## 2025-07-29 PROCEDURE — 86922 COMPATIBILITY TEST ANTIGLOB: CPT

## 2025-07-29 PROCEDURE — 2500000001 HC RX 250 WO HCPCS SELF ADMINISTERED DRUGS (ALT 637 FOR MEDICARE OP)

## 2025-07-29 PROCEDURE — 1120000001 HC OB PRIVATE ROOM DAILY

## 2025-07-29 PROCEDURE — 81003 URINALYSIS AUTO W/O SCOPE: CPT | Performed by: STUDENT IN AN ORGANIZED HEALTH CARE EDUCATION/TRAINING PROGRAM

## 2025-07-29 PROCEDURE — 7210000002 HC LABOR PER HOUR

## 2025-07-29 PROCEDURE — 85027 COMPLETE CBC AUTOMATED: CPT

## 2025-07-29 RX ORDER — LEVOTHYROXINE SODIUM 25 UG/1
25 TABLET ORAL
Status: DISCONTINUED | OUTPATIENT
Start: 2025-07-30 | End: 2025-07-31 | Stop reason: HOSPADM

## 2025-07-29 RX ORDER — METHYLERGONOVINE MALEATE 0.2 MG/ML
0.2 INJECTION INTRAVENOUS ONCE AS NEEDED
Status: DISCONTINUED | OUTPATIENT
Start: 2025-07-29 | End: 2025-07-30 | Stop reason: HOSPADM

## 2025-07-29 RX ORDER — ONDANSETRON 4 MG/1
4 TABLET, FILM COATED ORAL EVERY 6 HOURS PRN
Status: DISCONTINUED | OUTPATIENT
Start: 2025-07-29 | End: 2025-07-29

## 2025-07-29 RX ORDER — ONDANSETRON 4 MG/1
4 TABLET, FILM COATED ORAL EVERY 6 HOURS PRN
Status: DISCONTINUED | OUTPATIENT
Start: 2025-07-29 | End: 2025-07-31 | Stop reason: HOSPADM

## 2025-07-29 RX ORDER — LIDOCAINE HYDROCHLORIDE 10 MG/ML
20 INJECTION, SOLUTION INFILTRATION; PERINEURAL ONCE AS NEEDED
Status: DISCONTINUED | OUTPATIENT
Start: 2025-07-29 | End: 2025-07-30 | Stop reason: HOSPADM

## 2025-07-29 RX ORDER — ONDANSETRON HYDROCHLORIDE 2 MG/ML
4 INJECTION, SOLUTION INTRAVENOUS EVERY 6 HOURS PRN
Status: DISCONTINUED | OUTPATIENT
Start: 2025-07-29 | End: 2025-07-29

## 2025-07-29 RX ORDER — LOPERAMIDE HYDROCHLORIDE 2 MG/1
4 CAPSULE ORAL EVERY 2 HOUR PRN
Status: DISCONTINUED | OUTPATIENT
Start: 2025-07-29 | End: 2025-07-30 | Stop reason: HOSPADM

## 2025-07-29 RX ORDER — OXYTOCIN/0.9 % SODIUM CHLORIDE 30/500 ML
60 PLASTIC BAG, INJECTION (ML) INTRAVENOUS ONCE AS NEEDED
Status: DISCONTINUED | OUTPATIENT
Start: 2025-07-29 | End: 2025-07-30 | Stop reason: HOSPADM

## 2025-07-29 RX ORDER — HYDRALAZINE HYDROCHLORIDE 20 MG/ML
5 INJECTION INTRAMUSCULAR; INTRAVENOUS ONCE AS NEEDED
Status: DISCONTINUED | OUTPATIENT
Start: 2025-07-29 | End: 2025-07-30 | Stop reason: HOSPADM

## 2025-07-29 RX ORDER — MISOPROSTOL 200 UG/1
800 TABLET ORAL ONCE AS NEEDED
Status: DISCONTINUED | OUTPATIENT
Start: 2025-07-29 | End: 2025-07-30 | Stop reason: HOSPADM

## 2025-07-29 RX ORDER — TERBUTALINE SULFATE 1 MG/ML
0.25 INJECTION SUBCUTANEOUS ONCE AS NEEDED
Status: DISCONTINUED | OUTPATIENT
Start: 2025-07-29 | End: 2025-07-30 | Stop reason: HOSPADM

## 2025-07-29 RX ORDER — OXYTOCIN 10 [USP'U]/ML
10 INJECTION, SOLUTION INTRAMUSCULAR; INTRAVENOUS ONCE AS NEEDED
Status: DISCONTINUED | OUTPATIENT
Start: 2025-07-29 | End: 2025-07-30 | Stop reason: HOSPADM

## 2025-07-29 RX ORDER — CALCIUM CARBONATE 200(500)MG
1 TABLET,CHEWABLE ORAL EVERY 6 HOURS PRN
Status: DISCONTINUED | OUTPATIENT
Start: 2025-07-29 | End: 2025-07-31 | Stop reason: HOSPADM

## 2025-07-29 RX ORDER — SODIUM CHLORIDE, SODIUM LACTATE, POTASSIUM CHLORIDE, CALCIUM CHLORIDE 600; 310; 30; 20 MG/100ML; MG/100ML; MG/100ML; MG/100ML
75 INJECTION, SOLUTION INTRAVENOUS CONTINUOUS
Status: ACTIVE | OUTPATIENT
Start: 2025-07-29 | End: 2025-07-30

## 2025-07-29 RX ORDER — ONDANSETRON HYDROCHLORIDE 2 MG/ML
4 INJECTION, SOLUTION INTRAVENOUS EVERY 6 HOURS PRN
Status: DISCONTINUED | OUTPATIENT
Start: 2025-07-29 | End: 2025-07-31 | Stop reason: HOSPADM

## 2025-07-29 RX ORDER — LABETALOL HYDROCHLORIDE 5 MG/ML
20 INJECTION, SOLUTION INTRAVENOUS ONCE AS NEEDED
Status: DISCONTINUED | OUTPATIENT
Start: 2025-07-29 | End: 2025-07-30 | Stop reason: HOSPADM

## 2025-07-29 RX ORDER — CARBOPROST TROMETHAMINE 250 UG/ML
250 INJECTION, SOLUTION INTRAMUSCULAR ONCE AS NEEDED
Status: DISCONTINUED | OUTPATIENT
Start: 2025-07-29 | End: 2025-07-30 | Stop reason: HOSPADM

## 2025-07-29 RX ORDER — TRANEXAMIC ACID 1 G/10ML
1000 INJECTION, SOLUTION INTRAVENOUS ONCE AS NEEDED
Status: DISCONTINUED | OUTPATIENT
Start: 2025-07-29 | End: 2025-07-30 | Stop reason: HOSPADM

## 2025-07-29 RX ADMIN — MISOPROSTOL 25 MCG: 100 TABLET ORAL at 22:01

## 2025-07-29 RX ADMIN — MISOPROSTOL 25 MCG: 100 TABLET ORAL at 18:54

## 2025-07-29 SDOH — ECONOMIC STABILITY: FOOD INSECURITY: WITHIN THE PAST 12 MONTHS, THE FOOD YOU BOUGHT JUST DIDN'T LAST AND YOU DIDN'T HAVE MONEY TO GET MORE.: NEVER TRUE

## 2025-07-29 SDOH — SOCIAL STABILITY: SOCIAL INSECURITY
WITHIN THE LAST YEAR, HAVE YOU BEEN RAPED OR FORCED TO HAVE ANY KIND OF SEXUAL ACTIVITY BY YOUR PARTNER OR EX-PARTNER?: NO

## 2025-07-29 SDOH — SOCIAL STABILITY: SOCIAL INSECURITY: VERBAL ABUSE: DENIES

## 2025-07-29 SDOH — SOCIAL STABILITY: SOCIAL INSECURITY: WITHIN THE LAST YEAR, HAVE YOU BEEN AFRAID OF YOUR PARTNER OR EX-PARTNER?: NO

## 2025-07-29 SDOH — HEALTH STABILITY: MENTAL HEALTH: SUICIDAL BEHAVIOR (LIFETIME): NO

## 2025-07-29 SDOH — SOCIAL STABILITY: SOCIAL INSECURITY
WITHIN THE LAST YEAR, HAVE YOU BEEN KICKED, HIT, SLAPPED, OR OTHERWISE PHYSICALLY HURT BY YOUR PARTNER OR EX-PARTNER?: NO

## 2025-07-29 SDOH — ECONOMIC STABILITY: FOOD INSECURITY: WITHIN THE PAST 12 MONTHS, YOU WORRIED THAT YOUR FOOD WOULD RUN OUT BEFORE YOU GOT THE MONEY TO BUY MORE.: NEVER TRUE

## 2025-07-29 SDOH — ECONOMIC STABILITY: HOUSING INSECURITY: DO YOU FEEL UNSAFE GOING BACK TO THE PLACE WHERE YOU ARE LIVING?: NO

## 2025-07-29 SDOH — HEALTH STABILITY: MENTAL HEALTH: WISH TO BE DEAD (PAST 1 MONTH): NO

## 2025-07-29 SDOH — ECONOMIC STABILITY: TRANSPORTATION INSECURITY: IN THE PAST 12 MONTHS, HAS LACK OF TRANSPORTATION KEPT YOU FROM MEDICAL APPOINTMENTS OR FROM GETTING MEDICATIONS?: NO

## 2025-07-29 SDOH — SOCIAL STABILITY: SOCIAL INSECURITY: HAS ANYONE EVER THREATENED TO HURT YOUR FAMILY OR YOUR PETS?: NO

## 2025-07-29 SDOH — SOCIAL STABILITY: SOCIAL INSECURITY: ARE THERE ANY APPARENT SIGNS OF INJURIES/BEHAVIORS THAT COULD BE RELATED TO ABUSE/NEGLECT?: NO

## 2025-07-29 SDOH — ECONOMIC STABILITY: FOOD INSECURITY: HOW HARD IS IT FOR YOU TO PAY FOR THE VERY BASICS LIKE FOOD, HOUSING, MEDICAL CARE, AND HEATING?: NOT HARD AT ALL

## 2025-07-29 SDOH — SOCIAL STABILITY: SOCIAL INSECURITY: HAVE YOU HAD ANY THOUGHTS OF HARMING ANYONE ELSE?: NO

## 2025-07-29 SDOH — SOCIAL STABILITY: SOCIAL INSECURITY: PHYSICAL ABUSE: DENIES

## 2025-07-29 SDOH — HEALTH STABILITY: MENTAL HEALTH: NON-SPECIFIC ACTIVE SUICIDAL THOUGHTS (PAST 1 MONTH): NO

## 2025-07-29 SDOH — SOCIAL STABILITY: SOCIAL INSECURITY: WITHIN THE LAST YEAR, HAVE YOU BEEN HUMILIATED OR EMOTIONALLY ABUSED IN OTHER WAYS BY YOUR PARTNER OR EX-PARTNER?: NO

## 2025-07-29 SDOH — SOCIAL STABILITY: SOCIAL INSECURITY: DOES ANYONE TRY TO KEEP YOU FROM HAVING/CONTACTING OTHER FRIENDS OR DOING THINGS OUTSIDE YOUR HOME?: NO

## 2025-07-29 SDOH — SOCIAL STABILITY: SOCIAL INSECURITY: HAVE YOU HAD THOUGHTS OF HARMING ANYONE ELSE?: NO

## 2025-07-29 SDOH — SOCIAL STABILITY: SOCIAL INSECURITY: DO YOU FEEL ANYONE HAS EXPLOITED OR TAKEN ADVANTAGE OF YOU FINANCIALLY OR OF YOUR PERSONAL PROPERTY?: NO

## 2025-07-29 SDOH — SOCIAL STABILITY: SOCIAL INSECURITY: ARE YOU OR HAVE YOU BEEN THREATENED OR ABUSED PHYSICALLY, EMOTIONALLY, OR SEXUALLY BY ANYONE?: NO

## 2025-07-29 SDOH — HEALTH STABILITY: MENTAL HEALTH: WERE YOU ABLE TO COMPLETE ALL THE BEHAVIORAL HEALTH SCREENINGS?: YES

## 2025-07-29 SDOH — SOCIAL STABILITY: SOCIAL INSECURITY: ABUSE SCREEN: ADULT

## 2025-07-29 SDOH — HEALTH STABILITY: MENTAL HEALTH: CURRENT SMOKER: 0

## 2025-07-29 ASSESSMENT — PAIN SCALES - GENERAL
PAINLEVEL_OUTOF10: 4
PAINLEVEL_OUTOF10: 0 - NO PAIN
PAINLEVEL_OUTOF10: 0 - NO PAIN
PAINLEVEL_OUTOF10: 4
PAINLEVEL_OUTOF10: 0 - NO PAIN
PAINLEVEL_OUTOF10: 0 - NO PAIN
PAINLEVEL_OUTOF10: 3
PAINLEVEL_OUTOF10: 0 - NO PAIN
PAINLEVEL_OUTOF10: 0 - NO PAIN

## 2025-07-29 ASSESSMENT — ENCOUNTER SYMPTOMS
CARDIOVASCULAR NEGATIVE: 0
HEMATOLOGIC/LYMPHATIC NEGATIVE: 0
EYES NEGATIVE: 0
GASTROINTESTINAL NEGATIVE: 0
RESPIRATORY NEGATIVE: 0
NEUROLOGICAL NEGATIVE: 0
ENDOCRINE NEGATIVE: 0
ALLERGIC/IMMUNOLOGIC NEGATIVE: 0
MUSCULOSKELETAL NEGATIVE: 0
CONSTITUTIONAL NEGATIVE: 0
PSYCHIATRIC NEGATIVE: 0

## 2025-07-29 ASSESSMENT — LIFESTYLE VARIABLES
HOW OFTEN DO YOU HAVE 6 OR MORE DRINKS ON ONE OCCASION: NEVER
HOW MANY STANDARD DRINKS CONTAINING ALCOHOL DO YOU HAVE ON A TYPICAL DAY: PATIENT DOES NOT DRINK
HOW OFTEN DO YOU HAVE A DRINK CONTAINING ALCOHOL: NEVER
AUDIT-C TOTAL SCORE: 0
AUDIT-C TOTAL SCORE: 0
SKIP TO QUESTIONS 9-10: 1
AUDIT-C TOTAL SCORE: 0
HOW OFTEN DO YOU HAVE A DRINK CONTAINING ALCOHOL: NEVER
HOW OFTEN DO YOU HAVE 6 OR MORE DRINKS ON ONE OCCASION: NEVER
SKIP TO QUESTIONS 9-10: 1
AUDIT-C TOTAL SCORE: 0
HOW MANY STANDARD DRINKS CONTAINING ALCOHOL DO YOU HAVE ON A TYPICAL DAY: PATIENT DOES NOT DRINK

## 2025-07-29 ASSESSMENT — ACTIVITIES OF DAILY LIVING (ADL)
LACK_OF_TRANSPORTATION: NO
LACK_OF_TRANSPORTATION: NO

## 2025-07-29 ASSESSMENT — PATIENT HEALTH QUESTIONNAIRE - PHQ9
2. FEELING DOWN, DEPRESSED OR HOPELESS: NOT AT ALL
1. LITTLE INTEREST OR PLEASURE IN DOING THINGS: NOT AT ALL
2. FEELING DOWN, DEPRESSED OR HOPELESS: NOT AT ALL
1. LITTLE INTEREST OR PLEASURE IN DOING THINGS: NOT AT ALL
SUM OF ALL RESPONSES TO PHQ9 QUESTIONS 1 & 2: 0
SUM OF ALL RESPONSES TO PHQ9 QUESTIONS 1 & 2: 0

## 2025-07-29 NOTE — H&P
Admission H&P    Elidia Lindsay is a 35 y.o. year old  at 38w4d who presents for elevated blood pressures at term.    Assessment/Plan:    gHTN vs PEC w/o SF  - Mild range blood pressures <4hrs apart  - Asymptomatic  - HELLP labs pending  - P:C pending  - Reviewed with Dr. Srinivasan and decision made to begin IOL  - Routine admission labs, T&C 1u  - Cephalic on BSUS  - Needs consented     IOL  - s/p cerclage removal   - Cervix: 1cm in office today  - Discussed induction w/ CRB, cytotek, and pitocin  - AROM as indicated    IUP at 38w4d   - NST reactive  - GBS negative, defer PCN at this time  - PNL reviewed, WNL, and up to date  - cEFM    Hypothyroidism  - Continue synthroid 25mcg    Maternal Well-being  - Vital signs stable and WNL  - Pain management per patient request  - All questions and concerns addressed     Contraception  - Will address  - Options and education provided     Admission discussed with Dr. Lopez.  MD team for further management.    Sindi Nieto, APRN-CNP      Medical Problems       Problem List       * (Principal) Elevated blood pressure affecting pregnancy in third trimester, antepartum    Vitamin D deficiency    Recurrent pregnancy loss, currently pregnant ([facility])    Overview Signed 2025  9:02 AM by Palak Hill MD   Negative workup         Pregnancy conceived through in vitro fertilization ([facility])    Overview Addendum 2025  9:25 AM by Palak Hill MD   No PGT  RR cfDNA    Plan per initial consult:  30 and 36 week growth US.  Weekly NSTs starting at 28 weeks due to anxiety.  Fetal echo if heart views not optimal by MFM.          Rh negative status during pregnancy in third trimester ([facility])    Cervical insufficiency during pregnancy in third trimester, antepartum    Overview Addendum 2025  9:04 AM by Palak Hill MD   History indicated cerclage placed 25  For removal at 36-37 weeks         Thyroid disease during pregnancy in third trimester  ([facility])    Overview Signed 1/22/2025  8:43 AM by Palak Hill MD   Subclinical hypothyroidism on Synthroid 25mcg per ROBINA  Repeat TSH every trimester, normal in first         History of gestational diabetes in prior pregnancy, currently pregnant ([facility])    Overview Signed 2/25/2025  9:02 AM by Palak Hill MD   Early A1c normal         History of pre-eclampsia in prior pregnancy, currently pregnant ([facility])    Overview Addendum 2/2/2024  5:34 PM by Ana Guevara, APRN-CNP   -Plan for bASA and b/l PEC labs early  in next pregnancy              History of retained placenta in prior pregnancy, currently pregnant in second trimester ([facility])    Overview Signed 1/22/2025  8:47 AM by Palak Hill MD   2023 delivery: required manual placental extraction, had uterine atony requiring medications and Thais. Treated for presumed intraamniotic infection.          38 weeks gestation of pregnancy ([facility])    Overview Addendum 7/22/2025 12:56 PM by Lexie Gaxiola MD   Desired provider in labor: [] CNM  [] Physician   [] Either Acceptable  [] Blood Products: [] Yes, accepts [] No, needs counseling  [x] Initial BMI: 22.12   [x] Prenatal Labs:   [x] Cervical Cancer Screening up to date: [facility] 2023  [x] Rh status: NEGATIVE   [x] Genetic Screening (cfDNA): risk reducing  [x] First Trimester Anatomy Screen (11-13.6 wks): normal   [x] Baby ASA (initiated): taking  [x] Pregnancy dated by: embryo transfer    [x] Anatomy US: (19-20 wks)  [] Federal Sterilization consent signed (if indicated):  [x] 1hr GCT at 24-28wks: POCT testing neg  [x] Rhogam (if indicated):   [x] Fetal Surveillance (if indicated): planning for 30 and 36 week growth US and weekly NSTs starting at 28 weeks  [x] Tdap (27-32 wks, may be given up to 36 wks if initial window missed):   [] RSV (32-36 wks) (Sept. to end of Jan):     [] Feeding Intentions:  [] Postpartum Birth control method:   [x] GBS at 36 - 37 wks: negative  [x] 39 weeks discussion of IOL vs.  Expectant management: 39 weeks recommended due to IVF pregnancy   [x] Mode of delivery ( anticipated ): vaginal                 Subjective   Elidia Lindsay is a 35 y.o. year old  at 38w4d who presents for elevated blood pressures in the office.  Reports a history of PEC in her prior pregnancy.  Had a mild range at her office visit.  Reports her doctor in the office has been concerned about her up-trending blood pressures.  Denies HA, CP, SOB, RUQ pain, vision changes or N/V. Denies VB, LOF, ctx, and reports good fetal movement.      OB History          6    Para   1    Term   1       0    AB   4    Living   1         SAB   3    IAB   1    Ectopic   0    Multiple   0    Live Births   1                  Surgical History[1]     Social History     Socioeconomic History    Marital status:      Spouse name: Jomar Lindsay    Number of children: Not on file    Years of education: Not on file    Highest education level: Not on file   Occupational History    Occupation: Homemaker   Tobacco Use    Smoking status: Never    Smokeless tobacco: Never   Substance and Sexual Activity    Alcohol use: Not Currently    Drug use: Never    Sexual activity: Yes     Partners: Male   Other Topics Concern    Not on file   Social History Narrative    Not on file     Social Drivers of Health     Financial Resource Strain: Low Risk  (2025)    Overall Financial Resource Strain (CARDIA)     Difficulty of Paying Living Expenses: Not hard at all   Food Insecurity: No Food Insecurity (2025)    Hunger Vital Sign     Worried About Running Out of Food in the Last Year: Never true     Ran Out of Food in the Last Year: Never true   Transportation Needs: No Transportation Needs (2025)    PRAPARE - Transportation     Lack of Transportation (Medical): No     Lack of Transportation (Non-Medical): No   Physical Activity: Not on file   Stress: Not on file   Social Connections: Not on file   Intimate Partner  Violence: Not At Risk (7/29/2025)    Humiliation, Afraid, Rape, and Kick questionnaire     Fear of Current or Ex-Partner: No     Emotionally Abused: No     Physically Abused: No     Sexually Abused: No        RX Allergies[2]     Prescriptions Prior to Admission[3]     Objective     Visit Vitals  /88   Pulse 85   Temp 36.3 °C (97.3 °F)   Resp 17        Physical Exam  Physical Exam  Vitals reviewed.   Constitutional:       Appearance: Normal appearance.   HENT:      Head: Normocephalic.     Cardiovascular:      Rate and Rhythm: Normal rate.   Pulmonary:      Effort: Pulmonary effort is normal.   Abdominal:      Comments: Gravid     Musculoskeletal:         General: Normal range of motion.     Skin:     General: Skin is warm.     Neurological:      Mental Status: She is alert and oriented to person, place, and time.     Psychiatric:         Mood and Affect: Mood normal.         Behavior: Behavior normal.          NST  Non-Stress Test   Baseline Fetal Heart Rate for Non-Stress Test: 130 BPM  Variability in Waveform for Non-Stress Test: Moderate  Accelerations in Non-Stress Test: Yes, greater than/equal to 15 bpm, lasting at least 15 seconds  Decelerations in Non-Stress Test: None  Contractions in Non-Stress Test: Not present  Interpretation of Non-Stress Test   Interpretation of Non-Stress Test: Reactive      Labs  Labs in chart were reviewed.             [1]   Past Surgical History:  Procedure Laterality Date    CERVICAL CERCLAGE      MOUTH SURGERY  03/25/2016    Oral Surgery Tooth Extraction    OTHER SURGICAL HISTORY  11/29/2022    Dilation and evacuation    OTHER SURGICAL HISTORY  01/05/2022    Rhinoplasty   [2] No Known Allergies  [3]   Medications Prior to Admission   Medication Sig Dispense Refill Last Dose/Taking    aspirin 81 mg EC tablet Take 2 tablets (162 mg) by mouth once daily.   7/28/2025 Bedtime    levothyroxine (Synthroid, Levoxyl) 25 mcg tablet Take 1 tablet (25 mcg) by mouth once daily in the  morning. Take before meals. 30 tablet 6 7/29/2025    omega-3 acid ethyl esters (Lovaza) 1 gram capsule Take 1 capsule (1 g) by mouth 2 times a day.   7/28/2025 Morning    PRENATAL 2-IRON-FOLIC ACID-OM3 ORAL Take by mouth.   7/29/2025 Morning

## 2025-07-29 NOTE — PROGRESS NOTES
MFM Follow-up  2025   3:24 PM     SUBJECTIVE    HPI: Elidia Lindsay is a 35 y.o.  at 38w4d here for RPNV. Denies contractions, bleeding, or LOF. Reports normal fetal movement. Patient reports no concerns today    OBJECTIVE    Visit Vitals  /87   Wt 60.3 kg (133 lb)   BMI 25.13 kg/m²   OB Status Pregnant   Smoking Status Never   BSA 1.61 m²   Initially mild range BP, then normaotensive on recheck  Gen: NAD  Pulm: normal respiratory effort  CV: regular rate, well perfused  Abd: soft, gravid, nontender  Ext: no edema   NST  FHT   Baseline 130  Variability Moderate  Accelerations Present  Decelerations Absent  [place] irregular ctx  Interpretation Reactive     ASSESSMENT & PLAN    Elidia Lindsay is a 35 y.o.  at 38w4d here for the following concerns we addressed today:    Assessment & Plan  38 weeks gestation of pregnancy ([facility])  -Up to date on routine care.   -Scheduled for 39 week induction   -GBS negative  -NST reactive today  -sent to triage for elevated blood pressure at term          History of gestational diabetes in prior pregnancy, currently pregnant ([facility])  -Had normal glucose values on pattern screening, will defer 1h GTT and presume no GDM          Thyroid disease during pregnancy in third trimester ([facility])  -  TSH 1.62, WNL         Cervical insufficiency during pregnancy in third trimester, antepartum  -S/p cerclage removal   - 1 cm on examination today            History of pre-eclampsia in prior pregnancy, currently pregnant ([facility])  -Aware of signs/symptoms of recurrence  -normotensive at home, mild range initially in office then normotensive on recheck - sent to triage for BP cycling, labs, etc and possible delivery          Pregnancy resulting from in vitro fertilization in third trimester ([facility])  -Normal fetal growth  -Continue weekly NSTs until delivery at 39 weeks          History of retained placenta in prior pregnancy, currently pregnant in second trimester  ([facility])  -Plan for type and cross at delivery and ultrasound to confirm complete placental removal at delivery              To L&D triage today and then to IOL if not delivered    Lexie Gaxiola MD

## 2025-07-29 NOTE — ASSESSMENT & PLAN NOTE
-Up to date on routine care.   -Scheduled for 39 week induction   -GBS negative  -NST reactive today  -sent to triage for elevated blood pressure at term

## 2025-07-29 NOTE — ANESTHESIA PREPROCEDURE EVALUATION
Patient: Elidia Lindsay    Evaluation Method: In-person visit    Procedure Information    Date: 07/29/25  Procedure: Labor Consult         Relevant Problems   Cardiac   (+) Elevated blood pressure affecting pregnancy in third trimester, antepartum      GYN   (+) 38 weeks gestation of pregnancy ([facility])   (+) Pregnancy conceived through in vitro fertilization ([facility])       Clinical information reviewed:    Allergies  Meds               NPO Detail:  No data recorded     OB/Gyn Evaluation    Present Pregnancy    Patient is pregnant now.   Obstetric History                Physical Exam    Airway  Mallampati: II  TM distance: >3 FB  Neck ROM: full  Mouth opening: 3 or more finger widths     Cardiovascular   Rhythm: regular     Dental - normal exam     Pulmonary - normal exam   Abdominal        Visit Vitals  BP (!) 140/86   Pulse 79   Temp 36.4 °C (97.5 °F)   Resp 16        Anesthesia Plan    History of general anesthesia?: yes  History of complications of general anesthesia?: no    ASA 2     epidural     The patient is not a current smoker.  Patient did not smoke on day of procedure.    Anesthetic plan and risks discussed with patient and spouse.  Use of blood products discussed with patient and spouse who consented to blood products.    Plan discussed with resident.

## 2025-07-29 NOTE — PROGRESS NOTES
Intrapartum Progress Note    Assessment/Plan   Elidia Lindsay is a 35 y.o.  at 38w4d by date of embryo transfer c/w 6.6wk US who presents for IOL in s/o new dx of PEC w/o SF    IOL  - Reports 1cm dilation from exam earlier today  - Patient defers CRB at this time, placed cyto #1 at 1855  - Plan to recheck in 3hrs, can consider CRB placement if no change  - Will start pitocin and AROM when appropriate  - Delivery plan: patient desires vaginal delivery, patient counseled on risks of labor, vaginal delivery, and possibility of C/S for varying maternal or fetal indications  - Nubain/epidural per patient request    PEC w/o SF  - Dx by MRBPs > 4hrs apart, admission P:C 0.37  - HELLP labs neg x 1  - BPs since admission normotensive to mild range, currently asymptomatic  - Will continue to monitor    Maternal Comorbidities  - IVF pregnancy  - Hx-indicated cerclage, s/p removal  - H/o PEC in prior pregnancy  - H/o manual placenta removal and atony requiring medication and Thais, T&C for 1u and plan for US to confirm complete placental removal at delivery (per MFM note)  - H/o GDM in prior pregnancy, home fingersticks WNL this pregnancy  - Hypothyroidism, on Synthroid 25mcg  - Rh neg, s/p Rhogam on     Fetal status  - CEFM; Cat 1 currently  - EFW: 2695 47%ile, .9 74%ile by US on    - GBS neg    Discussed with Dr. Saunders and Dr. Francisco Hernandez MD  PGY-1, Obstetrics & Gynecology      Subjective   Patient reports feeling well overall. Endorses mild contraction pain, denies VB, LOF, and endorses good FM. Denies HA, vision change, CP, SOB, and RUQ pain. States she has a low pain tolerance and would like an epidural sooner rather than later. She also reported a lot of pain and discomfort with her balloon during her previous pregnancy and would like to avoid it for now.    Objective   Last Vitals:  Temp Pulse Resp BP MAP Pulse Ox   36.3 °C (97.3 °F) 85 16 132/88 106 99 %     Vitals Min/Max Last 24  Hours:  Temp  Min: 36.3 °C (97.3 °F)  Max: 36.4 °C (97.5 °F)  Pulse  Min: 83  Max: 95  Resp  Min: 16  Max: 17  BP  Min: 118/84  Max: 143/93  MAP (mmHg)  Min: 98  Max: 109    Intake/Output:  No intake or output data in the 24 hours ending 07/29/25 1802    Physical Examination:  General: In no acute distress  HEENT: Normocephalic, atraumatic  Pulm: Breathing comfortably on room air  Abdomen: gravid  CV: Warm, well perfused  Extremities: Moving all extremities  Neuro: Alert and oriented  Psych: Appropriate mood and affect    FHT: 135/mod/+accels/-decels  [place]: Uterine irritability    Lab Review:  Lab Results   Component Value Date    WBC 8.7 07/29/2025    HGB 12.6 07/29/2025    HCT 38.0 07/29/2025     07/29/2025     Lab Results   Component Value Date    GLUCOSE 70 (L) 07/29/2025     (L) 07/29/2025    K 4.2 07/29/2025     07/29/2025    CO2 17 (L) 07/29/2025    ANIONGAP 17 07/29/2025    BUN 18 07/29/2025    CREATININE 0.79 07/29/2025    EGFR >90 07/29/2025    CALCIUM 9.5 07/29/2025    ALBUMIN 3.5 07/29/2025    PROT 6.2 (L) 07/29/2025    ALKPHOS 155 (H) 07/29/2025    ALT 11 07/29/2025    AST 20 07/29/2025    BILITOT 0.5 07/29/2025

## 2025-07-29 NOTE — ASSESSMENT & PLAN NOTE
-Aware of signs/symptoms of recurrence  -normotensive at home, mild range initially in office then normotensive on recheck - sent to triage for BP cycling, labs, etc and possible delivery

## 2025-07-30 ENCOUNTER — ANESTHESIA (OUTPATIENT)
Dept: OBSTETRICS AND GYNECOLOGY | Facility: HOSPITAL | Age: 35
End: 2025-07-30
Payer: COMMERCIAL

## 2025-07-30 ENCOUNTER — ANESTHESIA EVENT (OUTPATIENT)
Dept: OBSTETRICS AND GYNECOLOGY | Facility: HOSPITAL | Age: 35
End: 2025-07-30
Payer: COMMERCIAL

## 2025-07-30 PROBLEM — O14.03 MILD PRE-ECLAMPSIA IN THIRD TRIMESTER (HHS-HCC): Status: ACTIVE | Noted: 2025-07-30

## 2025-07-30 LAB
ABO GROUP (TYPE) IN BLOOD: NORMAL
ANTIBODY SCREEN: NORMAL
RH FACTOR (ANTIGEN D): NORMAL

## 2025-07-30 PROCEDURE — 3700000014 EPIDURAL BLOCK: Mod: GC

## 2025-07-30 PROCEDURE — 59409 OBSTETRICAL CARE: CPT | Performed by: ADVANCED PRACTICE MIDWIFE

## 2025-07-30 PROCEDURE — 88307 TISSUE EXAM BY PATHOLOGIST: CPT | Mod: TC,SUR | Performed by: OBSTETRICS & GYNECOLOGY

## 2025-07-30 PROCEDURE — 1100000001 HC PRIVATE ROOM DAILY

## 2025-07-30 PROCEDURE — 2500000001 HC RX 250 WO HCPCS SELF ADMINISTERED DRUGS (ALT 637 FOR MEDICARE OP): Performed by: ADVANCED PRACTICE MIDWIFE

## 2025-07-30 PROCEDURE — 1120000001 HC OB PRIVATE ROOM DAILY

## 2025-07-30 PROCEDURE — 2500000004 HC RX 250 GENERAL PHARMACY W/ HCPCS (ALT 636 FOR OP/ED)

## 2025-07-30 PROCEDURE — 10907ZC DRAINAGE OF AMNIOTIC FLUID, THERAPEUTIC FROM PRODUCTS OF CONCEPTION, VIA NATURAL OR ARTIFICIAL OPENING: ICD-10-PCS | Performed by: ADVANCED PRACTICE MIDWIFE

## 2025-07-30 PROCEDURE — 7100000016 HC LABOR RECOVERY PER HOUR

## 2025-07-30 PROCEDURE — 51701 INSERT BLADDER CATHETER: CPT

## 2025-07-30 PROCEDURE — 2500000001 HC RX 250 WO HCPCS SELF ADMINISTERED DRUGS (ALT 637 FOR MEDICARE OP)

## 2025-07-30 PROCEDURE — 7210000002 HC LABOR PER HOUR

## 2025-07-30 RX ORDER — LOPERAMIDE HYDROCHLORIDE 2 MG/1
4 CAPSULE ORAL EVERY 2 HOUR PRN
Status: DISCONTINUED | OUTPATIENT
Start: 2025-07-30 | End: 2025-07-31 | Stop reason: HOSPADM

## 2025-07-30 RX ORDER — LIDOCAINE HYDROCHLORIDE AND EPINEPHRINE 15; 5 MG/ML; UG/ML
INJECTION, SOLUTION EPIDURAL AS NEEDED
Status: DISCONTINUED | OUTPATIENT
Start: 2025-07-30 | End: 2025-07-30

## 2025-07-30 RX ORDER — ADHESIVE BANDAGE
10 BANDAGE TOPICAL
Status: DISCONTINUED | OUTPATIENT
Start: 2025-07-30 | End: 2025-07-31 | Stop reason: HOSPADM

## 2025-07-30 RX ORDER — ONDANSETRON 4 MG/1
4 TABLET, FILM COATED ORAL EVERY 6 HOURS PRN
Status: DISCONTINUED | OUTPATIENT
Start: 2025-07-30 | End: 2025-07-31 | Stop reason: HOSPADM

## 2025-07-30 RX ORDER — POLYETHYLENE GLYCOL 3350 17 G/17G
17 POWDER, FOR SOLUTION ORAL 2 TIMES DAILY PRN
Status: DISCONTINUED | OUTPATIENT
Start: 2025-07-30 | End: 2025-07-31 | Stop reason: HOSPADM

## 2025-07-30 RX ORDER — MISOPROSTOL 200 UG/1
800 TABLET ORAL ONCE AS NEEDED
Status: DISCONTINUED | OUTPATIENT
Start: 2025-07-30 | End: 2025-07-31 | Stop reason: HOSPADM

## 2025-07-30 RX ORDER — OXYTOCIN 10 [USP'U]/ML
10 INJECTION, SOLUTION INTRAMUSCULAR; INTRAVENOUS ONCE AS NEEDED
Status: DISCONTINUED | OUTPATIENT
Start: 2025-07-30 | End: 2025-07-31 | Stop reason: HOSPADM

## 2025-07-30 RX ORDER — LABETALOL HYDROCHLORIDE 5 MG/ML
20 INJECTION, SOLUTION INTRAVENOUS ONCE AS NEEDED
Status: DISCONTINUED | OUTPATIENT
Start: 2025-07-30 | End: 2025-07-31 | Stop reason: HOSPADM

## 2025-07-30 RX ORDER — DIPHENHYDRAMINE HYDROCHLORIDE 50 MG/ML
25 INJECTION, SOLUTION INTRAMUSCULAR; INTRAVENOUS EVERY 6 HOURS PRN
Status: DISCONTINUED | OUTPATIENT
Start: 2025-07-30 | End: 2025-07-31 | Stop reason: HOSPADM

## 2025-07-30 RX ORDER — ONDANSETRON HYDROCHLORIDE 2 MG/ML
4 INJECTION, SOLUTION INTRAVENOUS EVERY 6 HOURS PRN
Status: DISCONTINUED | OUTPATIENT
Start: 2025-07-30 | End: 2025-07-31 | Stop reason: HOSPADM

## 2025-07-30 RX ORDER — HYDRALAZINE HYDROCHLORIDE 20 MG/ML
5 INJECTION INTRAMUSCULAR; INTRAVENOUS ONCE AS NEEDED
Status: DISCONTINUED | OUTPATIENT
Start: 2025-07-30 | End: 2025-07-31 | Stop reason: HOSPADM

## 2025-07-30 RX ORDER — CARBOPROST TROMETHAMINE 250 UG/ML
250 INJECTION, SOLUTION INTRAMUSCULAR ONCE AS NEEDED
Status: DISCONTINUED | OUTPATIENT
Start: 2025-07-30 | End: 2025-07-31 | Stop reason: HOSPADM

## 2025-07-30 RX ORDER — OXYTOCIN/0.9 % SODIUM CHLORIDE 30/500 ML
60 PLASTIC BAG, INJECTION (ML) INTRAVENOUS ONCE AS NEEDED
Status: DISCONTINUED | OUTPATIENT
Start: 2025-07-30 | End: 2025-07-31 | Stop reason: HOSPADM

## 2025-07-30 RX ORDER — IBUPROFEN 600 MG/1
600 TABLET, FILM COATED ORAL EVERY 6 HOURS
Status: DISCONTINUED | OUTPATIENT
Start: 2025-07-30 | End: 2025-07-31 | Stop reason: HOSPADM

## 2025-07-30 RX ORDER — DIPHENHYDRAMINE HCL 25 MG
25 CAPSULE ORAL EVERY 6 HOURS PRN
Status: DISCONTINUED | OUTPATIENT
Start: 2025-07-30 | End: 2025-07-31 | Stop reason: HOSPADM

## 2025-07-30 RX ORDER — SIMETHICONE 80 MG
80 TABLET,CHEWABLE ORAL 4 TIMES DAILY PRN
Status: DISCONTINUED | OUTPATIENT
Start: 2025-07-30 | End: 2025-07-31 | Stop reason: HOSPADM

## 2025-07-30 RX ORDER — METHYLERGONOVINE MALEATE 0.2 MG/ML
0.2 INJECTION INTRAVENOUS ONCE AS NEEDED
Status: DISCONTINUED | OUTPATIENT
Start: 2025-07-30 | End: 2025-07-31 | Stop reason: HOSPADM

## 2025-07-30 RX ORDER — TRANEXAMIC ACID 1 G/10ML
1000 INJECTION, SOLUTION INTRAVENOUS ONCE AS NEEDED
Status: DISCONTINUED | OUTPATIENT
Start: 2025-07-30 | End: 2025-07-31 | Stop reason: HOSPADM

## 2025-07-30 RX ORDER — FENTANYL/ROPIVACAINE/NS/PF 2MCG/ML-.2
0-25 PLASTIC BAG, INJECTION (ML) INJECTION CONTINUOUS
Status: DISCONTINUED | OUTPATIENT
Start: 2025-07-30 | End: 2025-07-31 | Stop reason: HOSPADM

## 2025-07-30 RX ORDER — ACETAMINOPHEN 325 MG/1
975 TABLET ORAL EVERY 6 HOURS
Status: DISCONTINUED | OUTPATIENT
Start: 2025-07-30 | End: 2025-07-31 | Stop reason: HOSPADM

## 2025-07-30 RX ADMIN — Medication 2.5 ML: at 02:12

## 2025-07-30 RX ADMIN — ACETAMINOPHEN 975 MG: 325 TABLET ORAL at 19:14

## 2025-07-30 RX ADMIN — IBUPROFEN 600 MG: 600 TABLET ORAL at 19:14

## 2025-07-30 RX ADMIN — ONDANSETRON 4 MG: 2 INJECTION INTRAMUSCULAR; INTRAVENOUS at 05:30

## 2025-07-30 RX ADMIN — Medication 4 ML/HR: at 02:15

## 2025-07-30 RX ADMIN — LIDOCAINE HYDROCHLORIDE AND EPINEPHRINE 3 ML: 15; 5 INJECTION, SOLUTION EPIDURAL at 02:05

## 2025-07-30 RX ADMIN — ACETAMINOPHEN 975 MG: 325 TABLET ORAL at 10:56

## 2025-07-30 RX ADMIN — IBUPROFEN 600 MG: 600 TABLET ORAL at 10:57

## 2025-07-30 RX ADMIN — Medication 2.5 ML: at 05:57

## 2025-07-30 RX ADMIN — LEVOTHYROXINE SODIUM 25 MCG: 0.03 TABLET ORAL at 07:41

## 2025-07-30 SDOH — HEALTH STABILITY: MENTAL HEALTH: CURRENT SMOKER: 0

## 2025-07-30 ASSESSMENT — PAIN SCALES - GENERAL
PAINLEVEL_OUTOF10: 5 - MODERATE PAIN
PAINLEVEL_OUTOF10: 0 - NO PAIN
PAINLEVEL_OUTOF10: 0 - NO PAIN
PAINLEVEL_OUTOF10: 6
PAINLEVEL_OUTOF10: 0 - NO PAIN

## 2025-07-30 ASSESSMENT — PAIN - FUNCTIONAL ASSESSMENT: PAIN_FUNCTIONAL_ASSESSMENT: 0-10

## 2025-07-30 NOTE — CARE PLAN
The patient's goals for the shift include Rest & bond with     The clinical goals for the shift include Adequate pain control, normal VS (BP<160/110), normal transition to post partum    Problem: Pain - Adult  Goal: Verbalizes/displays adequate comfort level or baseline comfort level  2025 by Lizbeth Pappas RN  Outcome: Progressing    Problem: Safety - Adult  Goal: Free from fall injury  2025 by Lizbeth Pappas RN  Outcome: Progressing    Problem: Postpartum  Goal: Experiences normal postpartum course  2025 by Lizbeth Pappas RN  Outcome: Progressing  Goal: Appropriate maternal -  bonding  2025 by Lizbeth Pappas RN  Outcome: Progressing  Goal: Establish and maintain infant feeding pattern for adequate nutrition  2025 by Lizbeth Pappas RN  Outcome: Progressing  Goal: No s/sx infection  2025 by Lizbeth Pappas RN  Outcome: Progressing  Goal: No s/sx of hemorrhage  2025 by Lizbeth Pappas RN  Outcome: Progressing  Goal: Minimal s/sx of HDP and BP<160/110  2025 by Lizbeth Pappas RN  Outcome: Progressing    Problem: Hypertensive Disorder of Pregnancy (HDP)  Goal: Minimal s/sx of HDP and BP<160/110  Outcome: Progressing  Goal: Adequate urine output (0.5 ml/kg/hr)  Outcome: Progressing    VS stable (BP<160/110), voiding, minimal bleeding, pain well controlled,  site free from S/S of infection, free from S/S of SPEC, breastfeeding/pumping breast milk

## 2025-07-30 NOTE — CARE PLAN
The patient's goals for the shift include      The clinical goals for the shift include Progression of labor; BP <160/110    Over the shift, the patient did not make progress toward the following goals. Barriers to progression include ***. Recommendations to address these barriers include ***.

## 2025-07-30 NOTE — SIGNIFICANT EVENT
"Labor check    S: To bedside for cervical exam due to increasing pressure. Patient resting comfortably, epidural in place.     O:  /77   Pulse 86   Temp 36.5 °C (97.7 °F)   Resp 17   Ht (!) 1.549 m (5' 1\")   Wt 60.8 kg (134 lb 0.6 oz)   SpO2 99%   BMI 25.33 kg/m²     SVE: 9/90/0  FHT: 135/mod/+accels/-decels  [place]: q1-2 mins    A/P: Elidia is a 35 y.o.  at 38w5d undergoing IOL in s/o PEC w/o SF    IOL  - active labor  - plan for continued position changes  - GBS neg  - epidural in place    Fetal Wellbeing  - CEFM, Cat I currently       Seen w/ Dr. Chip Hernandez MD  PGY-1              "

## 2025-07-30 NOTE — SIGNIFICANT EVENT
"Labor check    S: To bedside due to recurrent variable vs early decelerations and patient feeling additional rectal pressure. Patient resting comfortably, epidural in place.    O:  /77   Pulse 91   Temp 36.4 °C (97.5 °F)   Resp 17   Ht (!) 1.549 m (5' 1\")   Wt 60.8 kg (134 lb 0.6 oz)   SpO2 (!) 92%   BMI 25.33 kg/m²     SVE: 8/80/-1  FHT: 140/mod/+accels/+decels  [place]: q2-3 mins    A/P: Elidia is a 35 y.o.  at 38w5d undergoing IOL in s/o PEC w/o SF    IOL  - active labor  - plan for recheck in 2hr or per maternal or fetal indications  - GBS neg  - epidural in place    Fetal Wellbeing  - CEFM, Cat II currently due to recurrent variable vs early decelerations and some minimal variability. Tracing shows improved variability after scalp stimulation, will continue to monitor.    Seen and discussed with Dr. Chip Hernandez MD  PGY-1                "

## 2025-07-30 NOTE — SIGNIFICANT EVENT
"Labor Check    Subjective: Feeling increased rectal pressure    Objective:  Vitals: /66   Pulse 105   Temp 36.4 °C (97.5 °F) (Temporal)   Resp 17   Ht (!) 1.549 m (5' 1\")   Wt 60.8 kg (134 lb 0.6 oz)   SpO2 98%   BMI 25.33 kg/m²   SVE: 6/80/-2  FHT: 140/mod/+accel/recurrent variable decels  [place]: q2-3min    Active labor , plan to recheck in 2 hrs or PRN   S/p cyto x2, pitocin not indicated given frequency of ctx. Will start if spacing.   CEFM, currently Category 2 with variables, reassuring with mod variability   Epidural infusing    uSshma Saunders MD  PGY4, Obstetrics and Gynecology    "

## 2025-07-30 NOTE — ANESTHESIA PROCEDURE NOTES
CSE Block    Patient location during procedure: OR  Start time: 7/30/2025 1:50 AM  End time: 7/30/2025 2:21 AM  Reason for block: primary anesthetic, procedure for pain and labor analgesia}  Staffing  Performed: attending   Authorized by: Lorenzo Espinosa MD    Performed by: Franck Roberson MD    Preanesthetic Checklist  Completed: patient identified, risks and benefits discussed, surgical consent, monitors and equipment checked, pre-op evaluation, timeout performed and sterile techniques followed  Block Timeout  RN/Licensed healthcare professional reads aloud to the Anesthesia provider and entire team: Patient identity, procedure with side and site, patient position, and as applicable the availability of implants/special equipment/special requirements.  Patient on coagulant treatment: no  Timeout performed at: 7/30/2025 1:45 AM    CSE Block  Patient position: sitting  Prep: ChloraPrep  Sterility prep: drape, gloves, mask and cap  Sedation level: no sedation  Patient monitoring: blood pressure, continuous pulse oximetry and heart rate  Approach: midline  Local numbing: lidocaine 1% to skin and subcutaneous tissues  Vertebral space: lumbar  L3-4  Guidance: landmark technique    Epidural Needle  OREN technique: saline  Needle type: Tuohy   Needle gauge: 19 G  Needle length: 9 cm  Needle insertion depth: 5.5 cm  Spinal Needle  Needle gauge: 25 G  Needle length: 12.7 cm  Free flow CSF: yes  Epidural Catheter  Catheter type: multi-orifice  Catheter at skin depth: 10.5 cm  Catheter securement method: clear occlusive dressing and liquid medical adhesive    Test dose given at 7/30/2025 2:05 AM    PCEA  Medication concentration used: 0.2% Ropivacaine with 2 mcg/mL Fentanyl  Dose (mL): 3  Lockout (minutes): 30  1-Hour Limit (boluses/hr): 2  Basal Rate: 4    PIEB  PIEB Bolus (mL):  PIEB Bolus Interval (minutes):  PCEA Bolus (mL):  PCEA Lockout Time (minutes):  Maximum PCEA Bolus/Hr: 2    Assessment  Block outcome: block to be  assessed in the OR

## 2025-07-30 NOTE — SIGNIFICANT EVENT
"Labor check    S: To bedside for cytotec placement. Patient resting comfortably in bed.    O:  /83   Pulse 84   Temp 36.4 °C (97.5 °F)   Resp 17   Ht (!) 1.549 m (5' 1\")   Wt 60.8 kg (134 lb 0.6 oz)   SpO2 98%   BMI 25.33 kg/m²     General: In no acute distress  HEENT: Normocephalic, atraumatic  Pulm: Breathing comfortably on room air  CV: Warm, well perfused  Abdomen: gravid  Extremities: Moving all extremities  Neuro: Alert and oriented  Psych: Appropriate mood and affect    SVE: 50/-3  FHT: 130/mod/+accels/-decels  [place]: q1 min, uterine irritability    A/P: Elidia is a 35 y.o.  at 38w4d undergoing IOL in s/o PEC w/o SF    IOL  - latent labor  - Cyto #2 placed at 2200  - Plan to reassess at 0100 and can consider CRB placement and cyto #3 at that time  - GBS neg  - epidural per patient request    Fetal Wellbeing  - CEFM, Cat I currently     Seen and discussed with Dr. Chip Hernandez MD  PGY-1              "

## 2025-07-30 NOTE — PROGRESS NOTES
07/30/25 1529   Discharge Planning   Home or Post Acute Services   (blood pressure monitor)     Patient meets criteria for home monitoring of blood pressure post discharge.  Reason: current preeclampsia without severe features. Met with patient to assess for availability of home BP monitor.  Patient stated she owns home BP monitor.  Patient educated on importance of continuing to monitor BP at home, recording BP on home monitoring log and s/sx of when to call her provider.  Pt verbalized understanding the above information.

## 2025-07-30 NOTE — ANESTHESIA PROCEDURE NOTES
Epidural Block    Patient location during procedure: OB  Start time: 7/30/2025 1:50 AM  End time: 7/30/2025 2:15 AM  Reason for block: labor analgesia  Staffing  Performed: resident and attending   Authorized by: Lorenzo Espinosa MD    Performed by: Franck Roberson MD    Preanesthetic Checklist  Completed: patient identified, IV checked, risks and benefits discussed, surgical consent, pre-op evaluation, timeout performed and sterile techniques followed  Block Timeout  RN/Licensed healthcare professional reads aloud to the Anesthesia provider and entire team: Patient identity, procedure with side and site, patient position, and as applicable the availability of implants/special equipment/special requirements.  Patient on coagulant treatment: no  Timeout performed at: 7/30/2025 1:45 AM  Block Placement  Patient position: sitting  Prep: ChloraPrep  Sterility prep: cap, drape, gloves, hand and mask  Sedation level: no sedation  Patient monitoring: blood pressure and continuous pulse oximetry  Approach: midline  Local numbing: lidocaine 1% to skin and subcutaneous tissues  Vertebral space: lumbar  Lumbar location: L3-L4  Epidural  Loss of resistance technique: saline  Guidance: landmark technique        Needle  Needle type: Tuohy   Needle gauge: 17  Needle length: 8.9cm  Needle insertion depth: 5.5 cm  Catheter size: 20 G  Catheter at skin depth: 10.5 cm  Catheter securement method: liquid medical adhesive    Test dose: lidocaine 1.5% with epinephrine 1-to-200,000  Test dose: lidocaine 1.5% with epinephrine 1-to-200,000    PCEA  Medication concentration used: 0.2% Ropivacaine with 2 mcg/mL Fentanyl  Dose (mL): 3  Lockout (minutes): 30  1-Hour Limit (boluses/hr): 2  Basal Rate: 4        Assessment  Block outcome: patient comfortable  Number of attempts: 2  Procedure assessment: patient tolerated procedure well with no immediate complications  Additional Notes  Patient tolerated epidural placement well. L2 level bilaterally  after test dose. Smaller bolus of 2.5cc from PCEA initiated, given patient small body habitus, with resultant T8 level bilaterally. Decision made to run PCEA at 4cc/hr initially and uptitrate as needed.

## 2025-07-30 NOTE — CARE PLAN
The patient's goals for the shift include Rest & bond with     The clinical goals for the shift include Adequate pain control, normal VS (BP<160/110), normal transition to post partum    Problem: Pain - Adult  Goal: Verbalizes/displays adequate comfort level or baseline comfort level  2025 by Lizbeth Pappas RN  Outcome: Progressing     Problem: Safety - Adult  Goal: Free from fall injury  2025 by Lizbeth Pappas RN  Outcome: Progressing     Problem: Postpartum  Goal: Experiences normal postpartum course  2025 by Lizbeth Pappas RN  Outcome: Progressing  Goal: Appropriate maternal -  bonding  2025 by Lizbeth Pappas RN  Outcome: Progressing  Goal: Establish and maintain infant feeding pattern for adequate nutrition  2025 by Lizbeth Pappas RN  Outcome: Progressing  Goal: No s/sx infection  2025 by Lizbeth Pappas RN  Outcome: Progressing  Goal: No s/sx of hemorrhage  2025 by Lizbeth Pappas RN  Outcome: Progressing  Goal: Minimal s/sx of HDP and BP<160/110  2025 by Lizbeth Pappas RN  Outcome: Progressing    VS stable (BP<160/110), voiding, pain well controlled, minimal bleeding, formula feeding

## 2025-07-30 NOTE — L&D DELIVERY NOTE
Vaginal Delivery Note    Patient Name: Elidia Lindsay  : 1990  MRN: [id number]  Age: 35 y.o.    /Para:   Gestational Age: 38w5d    Date of Delivery: 2025    Procedure: Normal Spontaneous Vaginal Delivery    Delivery Provider: Karthik Curtis CNM    Resident/Fellow/Other Assistant: medical student     Description of Procedure:  Delivery of viable infant under epidural anesthesia. Delayed clamping was performed. The infant was placed skin to skin. Cord gases were not sent.  Cord blood was collected.  After 25 minutes placenta undelivered with gentle traction. Requested OB/GYN assistance, of note pt with hx retained placenta with manual removal and significant PPH. Dr [place]agena in to assess, delivered placenta which was intact though appears small with asymmetrical vasculature and short cord. Ultrasound performed of uterus with no findings suggestive of retained products.     Small R labial/ periurethral laceration noted, hemostatic, not repaired.     Additional Procedures:  None    Findings:   Amniotic fluid Clear, Female infant in Compound Occiput Anterior presentation, APGARS  ,  .  Birth Weight  .  Not available at time of documentation     Complications:  none    Quantitative Blood Loss:  400cc  Delivery QBL: 0 mL (2025  8:35 AM - 2025 12:01 PM) 400cc    Blood products:   none    Uterotonics/Hemostatic Agent: IV Pitocin 30 units    Specimen:   Placenta  Delivered: 2025  9:01 AM  Appearance: Abnormal  Removal: Spontaneous    Disposition: pathology    Sponge/Instrument/Needle Counts: The sponge, lap and needle counts were correct.    Patient Disposition: Patient recovering on labor and delivery in stable condition.    Emily Lindsay [[id number]]      Labor Events    Rupture date/time: 2025 0330  Rupture type: Artificial  Fluid color: Clear  Fluid odor: None  Labor type: Induced Onset of Labor  Labor allowed to proceed with plans for an attempted vaginal birth?:  Yes  Induction: Misoprostol  First cervical ripening date/time: 2025  Induction date/time: 2025  Induction indications: Hypertensive Disorder of Pregnancy       Labor Event Times    Labor onset date/time: 2025  Dilation complete date/time: 2025 0810  Start pushing date/time: 2025 08:27       Placenta    Placenta delivery date/time: 2025 09:01  Placenta removal: Spontaneous  Placenta appearance: Abnormal  Placenta disposition: pathology       Cord    Vessels: 3 vessels  Complications: None  Delayed cord clamping?: Yes  Cord clamped date/time: 2025 08:40:00  Cord blood disposition: Lab  Gases sent?: No  Stem cell collection (by provider): No       Lacerations    Episiotomy: None  Perineal laceration: None  Periurethral laceration?: Yes  Periurethral laceration location: right  Periurethral laceration repaired?: No  Repair suture: None       Anesthesia    Method: Epidural       Operative Delivery    Forceps attempted?: No  Vacuum extractor attempted?: No       Shoulder Dystocia    Shoulder dystocia present?: No       [place] Delivery    Time head delivered: 2025 08:34:00  Birth date/time: 2025 08:35:00  Delivery type: Vaginal, Spontaneous       Resuscitation    Method: Suctioning, Tactile stimulation       Apgars    Living status: Living  Apgar Component Scores:  1 min.:  5 min.:  10 min.:  15 min.:  20 min.:    Skin color:         Heart rate:         Reflex irritability:         Muscle tone:         Respiratory effort:         Total:                Delivery Providers    Delivering clinician: ALIDA Leija   Provider Role    Kay Dias RN Delivery Nurse    Becky Kiran RN Nursery Nurse

## 2025-07-30 NOTE — ANESTHESIA PREPROCEDURE EVALUATION
Patient: Elidia Lindsay    Evaluation Method: In-person visit    Procedure Information    Date: 07/29/25  Procedure: Labor Consult         Relevant Problems   Cardiac   (+) Elevated blood pressure affecting pregnancy in third trimester, antepartum      GYN   (+) 38 weeks gestation of pregnancy ([facility])   (+) Pregnancy conceived through in vitro fertilization ([facility])       Clinical information reviewed:    Allergies  Meds               NPO Detail:  No data recorded     OB/Gyn Evaluation    Present Pregnancy    Patient is pregnant now.   Obstetric History                Physical Exam    Airway  Mallampati: II  TM distance: >3 FB  Neck ROM: full  Mouth opening: 3 or more finger widths     Cardiovascular   Rhythm: regular     Dental - normal exam     Pulmonary - normal exam   Abdominal        Visit Vitals  BP (!) 129/96   Pulse 71   Temp 36.4 °C (97.5 °F)   Resp 16        Anesthesia Plan    History of general anesthesia?: yes  History of complications of general anesthesia?: no    ASA 2     epidural     The patient is not a current smoker.  Patient did not smoke on day of procedure.    Anesthetic plan and risks discussed with patient and spouse.  Use of blood products discussed with patient and spouse who consented to blood products.    Plan discussed with resident.

## 2025-07-30 NOTE — PROGRESS NOTES
Intrapartum Progress Note  Subjective   Feeling more pressure. Denies HA or visual changes.     Objective   Last Vitals:  Temp Pulse Resp BP MAP Pulse Ox   36.4 °C (97.5 °F) 93 18 119/85   100 %     FHT: 130 bpm, mod yassine, + accels, no decels  UC: q 2-3 min  SVE: fully / +1  Membranes:  ruptured      Assessment   35 y.o.  at 38w5d IOL, preeclampsia without severe features  FHT Cat 1    Plan   - Start pushing   - continue to monitor FHTs and VS closely   - reassess in 1 hrs or prn     Karthik Curtis, KENNETH-CNM

## 2025-07-30 NOTE — SIGNIFICANT EVENT
"Labor check    S: To bedside for AROM. Patient resting comfortably with epidural in place.     O:  /73   Pulse (!) 128   Temp 36.4 °C (97.5 °F)   Resp 16   Ht (!) 1.549 m (5' 1\")   Wt 60.8 kg (134 lb 0.6 oz)   SpO2 100%   BMI 25.33 kg/m²     General: In no acute distress  HEENT: Normocephalic, atraumatic  Pulm: Breathing comfortably on room air  CV: Warm, well perfused  Abdomen: gravid  Extremities: Moving all extremities  Neuro: Alert and oriented  Psych: Appropriate mood and affect    SVE: 3/50/-3  FHT: 140/mod/+accels/+variable decels  [place]: q1-2 mins    A/P: Elidia is a 35 y.o.  at 38w5d undergoing IOL in s/o PEC w/o SF    IOL  - latent labor  - AROM for clear fluid at 0330  - S/p cyto x 2 with uterine tachysystole, plan to start pitocin after improvement in contraction pattern  - GBS neg  - epidural in place    Fetal Wellbeing  - CEFM, Cat II currently d/t recurrent variable decels, however reassuring due to good variability overall. Will continue to monitor.    Seen and discussed with Dr. Chip Hernandez MD  PGY-1              "

## 2025-07-31 VITALS
BODY MASS INDEX: 25.31 KG/M2 | SYSTOLIC BLOOD PRESSURE: 126 MMHG | TEMPERATURE: 96.8 F | DIASTOLIC BLOOD PRESSURE: 83 MMHG | RESPIRATION RATE: 18 BRPM | HEART RATE: 66 BPM | HEIGHT: 61 IN | WEIGHT: 134.04 LBS | OXYGEN SATURATION: 99 %

## 2025-07-31 LAB — FETAL MATERNAL SCREEN: NORMAL

## 2025-07-31 PROCEDURE — 36415 COLL VENOUS BLD VENIPUNCTURE: CPT | Performed by: ADVANCED PRACTICE MIDWIFE

## 2025-07-31 PROCEDURE — 2500000001 HC RX 250 WO HCPCS SELF ADMINISTERED DRUGS (ALT 637 FOR MEDICARE OP): Performed by: ADVANCED PRACTICE MIDWIFE

## 2025-07-31 PROCEDURE — 2500000001 HC RX 250 WO HCPCS SELF ADMINISTERED DRUGS (ALT 637 FOR MEDICARE OP)

## 2025-07-31 PROCEDURE — 85461 HEMOGLOBIN FETAL: CPT

## 2025-07-31 PROCEDURE — 99239 HOSP IP/OBS DSCHRG MGMT >30: CPT | Performed by: FAMILY MEDICINE

## 2025-07-31 RX ORDER — IBUPROFEN 600 MG/1
600 TABLET, FILM COATED ORAL EVERY 6 HOURS
Qty: 120 TABLET | Refills: 0 | Status: SHIPPED | OUTPATIENT
Start: 2025-07-31 | End: 2025-08-30

## 2025-07-31 RX ORDER — ACETAMINOPHEN 325 MG/1
975 TABLET ORAL EVERY 6 HOURS
Qty: 360 TABLET | Refills: 0 | Status: SHIPPED | OUTPATIENT
Start: 2025-07-31 | End: 2025-08-30

## 2025-07-31 RX ORDER — POLYETHYLENE GLYCOL 3350 17 G/17G
17 POWDER, FOR SOLUTION ORAL 2 TIMES DAILY PRN
Qty: 238 G | Refills: 0 | Status: SHIPPED | OUTPATIENT
Start: 2025-07-31

## 2025-07-31 RX ADMIN — ACETAMINOPHEN 975 MG: 325 TABLET ORAL at 01:11

## 2025-07-31 RX ADMIN — ACETAMINOPHEN 975 MG: 325 TABLET ORAL at 06:03

## 2025-07-31 RX ADMIN — LEVOTHYROXINE SODIUM 25 MCG: 0.03 TABLET ORAL at 06:46

## 2025-07-31 RX ADMIN — IBUPROFEN 600 MG: 600 TABLET ORAL at 01:11

## 2025-07-31 RX ADMIN — IBUPROFEN 600 MG: 600 TABLET ORAL at 06:03

## 2025-07-31 ASSESSMENT — PAIN DESCRIPTION - DESCRIPTORS: DESCRIPTORS: CRAMPING

## 2025-07-31 ASSESSMENT — PAIN SCALES - GENERAL
PAINLEVEL_OUTOF10: 0 - NO PAIN
PAINLEVEL_OUTOF10: 2
PAINLEVEL_OUTOF10: 0 - NO PAIN

## 2025-07-31 NOTE — ANESTHESIA POSTPROCEDURE EVALUATION
Elidia Lindsay is a 35 y.o., , who had a Vaginal, Spontaneous delivery on 2025 at 38w5d and is now POD1.    She had Neuraxial Anesthesia without immediate complications noted.       Pain was appropriately tolerated by patient.     Vitals:    25 0417   BP: 104/77   Pulse: 73   Resp: 18   Temp: 36 °C (96.8 °F)   SpO2: 98%       Neuraxial site assessed. No visible redness or swelling. Pain acceptably controlled. Patient able to ambulate and move all extremities without difficulty. Able to void. No complaints of nausea/vomiting. Tolerating PO intake well. No s/sx of PDPH.     Neuraxial site without redness, drainage, swelling.  Neuromuscular block resolved.    Anesthesia will sign off     Te Monge MD

## 2025-07-31 NOTE — CARE PLAN
Problem: Pain - Adult  Goal: Verbalizes/displays adequate comfort level or baseline comfort level  Outcome: Adequate for Discharge     Problem: Safety - Adult  Goal: Free from fall injury  Outcome: Adequate for Discharge     Problem: Postpartum  Goal: Experiences normal postpartum course  Outcome: Adequate for Discharge  Goal: Appropriate maternal -  bonding  Outcome: Adequate for Discharge  Goal: Establish and maintain infant feeding pattern for adequate nutrition  Outcome: Adequate for Discharge  Goal: No s/sx infection  Outcome: Adequate for Discharge  Goal: No s/sx of hemorrhage  Outcome: Adequate for Discharge  Goal: Minimal s/sx of HDP and BP<160/110  Outcome: Adequate for Discharge    Vital signs stable throughout the shift, lochia has been WNL, patient is without s/s of HDP. Patient is bonding well with her !

## 2025-07-31 NOTE — DISCHARGE SUMMARY
Discharge Summary    Admission Date: 2025  Discharge Date: 25  Discharge Diagnosis: Elevated blood pressure affecting pregnancy in third trimester, antepartum     Problem List[1]    Hospital Course  Elidia Lindsay is a 35 y.o.,     Initially presented for: IOL in s/o elevated BPs at term > PEC w/o SF    Admission Date: 2025    Delivery Date: 2025 8:35 AM    Delivery type: Vaginal, Spontaneous     GA at delivery: 38w5d    Outcome: Living    Anesthesia during delivery: Epidural    Intrapartum complications: None    Feeding method: Breastfeeding Status: No    Contraception: declines bridge method and discussed pregnancy spacing of at least one year, abstaining from intercourse for 6wks, and the ability to become pregnant in the absence of regular menses. Pt verbalized understanding    Rhogam: The patient's blood type is O NEG. The baby's blood type is O NEG. Rhogam is not indicated.     Now postpartum day: 1.    Hospital course n/f:      PEC w/o SF  - dx by mild range BP readings >4hrs apart and P:C 0.37  - HELLP labs negative  - asx  - no meds  - normotensive with few, scattered mild ranges in PP period  - To take BPs at home and log, BP cuff given  - Reviewed signs of elevated BP, appropriate BP parameters and how to monitor BP at home   - Outpatient follow-up in 3 days for BP check, message sent to clinic for appt scheduling assistance    Subclinical hypothyroidism  - continue Levothyroxine 25 daily  - follow up with prescribing provider for lab work in PP period    Rh negative status  - s/p Rhogam   - infant O neg, postpartum rhogam not indicated      PP course otherwise uneventful.  Meeting all postpartum milestones- ambulating independently, passing flatus, tolerating PO intake, lochia light, voiding spontaneously, and pain well controlled with PO meds.       Dispo  I have reviewed with the patient the standard 3 day stay for hypertensive disorders and the risks/benefits of early  discharge, including death, stroke, seizure, and readmission. I strongly recommended that the patient stay for the recommended 3 days. She verbalizes understanding of risks. She verbalizes understanding of symptoms and BPs that warrant immediate medical attention, and desires discharge home today.     - The signs and symptoms of PEC were reviewed with the patient, including unrelenting headache, vision changes/blurred vision, and RUQ pain  - BP cuff for home for checking BP twice a day  - Pt instructed to call primary OB if SBP > 160 or DBP > 110 or if development of PEC symptoms    - Follow up with primary OB in:       - 2-5 days for BP check       - 4-6 weeks for post-partum visit       Pertinent Physical Exam At Time of Discharge  General: well appearing, well nourished, postpartum  Obstetric: fundus firm below umbilicus, lochia light  Skin: Warm, dry; no rashes/lesions/erythema  Neuro: A/Ox3, conversational, no gross motor deficit   GI: no distension, appropriately tender, soft  Respiratory: Even and unlabored on RA  Cardiovascular: Trace BLE edema; No erythema, warmth  Psych: appropriate mood and affect         Your medication list        START taking these medications        Instructions Last Dose Given Next Dose Due   acetaminophen 325 mg tablet  Commonly known as: Tylenol      Take 3 tablets (975 mg) by mouth every 6 hours.       ibuprofen 600 mg tablet      Take 1 tablet (600 mg) by mouth every 6 hours.       polyethylene glycol 17 gram/dose powder  Commonly known as: Glycolax, Miralax      Mix 17 g of powder and drink 2 times a day as needed for constipation.              CONTINUE taking these medications        Instructions Last Dose Given Next Dose Due   levothyroxine 25 mcg tablet  Commonly known as: Synthroid, Levoxyl      Take 1 tablet (25 mcg) by mouth once daily in the morning. Take before meals.       omega-3 acid ethyl esters 1 gram capsule  Commonly known as: Lovaza           PRENATAL  2-IRON-FOLIC ACID-OM3 ORAL                  STOP taking these medications      aspirin 81 mg EC tablet                  Where to Get Your Medications        These medications were sent to [facility]/pharmacy #[id number] - [place], [place] - [address] AT [facility]  [address], [place] [address]      Phone: [phone]   acetaminophen 325 mg tablet  ibuprofen 600 mg tablet  polyethylene glycol 17 gram/dose powder           Outpatient Follow-Up  No future appointments.    I spent 40 minutes in the professional and overall care of this patient    Diana Niño, APRN-CNP         [1]   Patient Active Problem List  Diagnosis    Vitamin D deficiency    Recurrent pregnancy loss, currently pregnant ([facility])    Pregnancy conceived through in vitro fertilization ([facility])    Rh negative status during pregnancy in third trimester ([facility])    Cervical insufficiency during pregnancy in third trimester, antepartum    Thyroid disease during pregnancy in third trimester ([facility])    History of gestational diabetes in prior pregnancy, currently pregnant ([facility])    History of pre-eclampsia in prior pregnancy, currently pregnant ([facility])    History of retained placenta in prior pregnancy, currently pregnant in second trimester ([facility])    38 weeks gestation of pregnancy ([facility])    Elevated blood pressure affecting pregnancy in third trimester, antepartum    Mild pre-eclampsia in third trimester ([facility])

## 2025-08-01 LAB
BLOOD EXPIRATION DATE: NORMAL
DISPENSE STATUS: NORMAL
PRODUCT BLOOD TYPE: 9500
PRODUCT CODE: NORMAL
UNIT ABO: NORMAL
UNIT NUMBER: NORMAL
UNIT RH: NORMAL
UNIT VOLUME: 321
XM INTEP: NORMAL

## 2025-08-05 LAB
LABORATORY COMMENT REPORT: NORMAL
PATH REPORT.FINAL DX SPEC: NORMAL
PATH REPORT.GROSS SPEC: NORMAL
PATH REPORT.RELEVANT HX SPEC: NORMAL
PATH REPORT.TOTAL CANCER: NORMAL

## (undated) DEVICE — DRAPE PACK, LITHOTOMY, CUSTOM, UHC

## (undated) DEVICE — PREP TRAY, SKIN, DRY, W/GLOVES

## (undated) DEVICE — CATHETER, URETHRAL, ROBNEL, 14 FR, 16 IN, LF, RED

## (undated) DEVICE — BOWL, BASIN, 32 OZ, STERILE

## (undated) DEVICE — COVER, LIGHT HANDLE, SURGICAL, RIGID, DISPOSABLE, GREEN, STERILE

## (undated) DEVICE — TOWEL PACK, STERILE, 4/PACK, BLUE

## (undated) DEVICE — SUTURE, ETHIBOND, 5, 30 IN, V40, MULTIPACK, GREEN